# Patient Record
Sex: MALE | Race: OTHER | URBAN - METROPOLITAN AREA
[De-identification: names, ages, dates, MRNs, and addresses within clinical notes are randomized per-mention and may not be internally consistent; named-entity substitution may affect disease eponyms.]

---

## 2021-05-26 NOTE — H&P ADULT - ASSESSMENT
76 year old Danish speaking male with PMHx HTN, HLD presented to cardiologist with progressive SOB on exertion.  Using  ( [id number]) to obtain further information, pt is unsure of medications that he take and does not know which pharmacy he uses. Pt referred to daughter to speak on his behalf. Daughter Hailee ( [phone]) contacted medications and pharmacy confirmed.  Pt is currently taking atorvastatin 20mg HS and Lisinopril 10mg daily .   Referred for cardiac cath and possible PCI     76 year old Persian speaking male with PMHx HTN, HLD presented to cardiologist with progressive SOB on exertion.  Using  ( [id number]) to obtain further information, pt is unsure of medications that he take and does not know which pharmacy he uses. Pt referred to daughter to speak on his behalf. Daughter Hailee ( [phone]) contacted medications and pharmacy confirmed.  Pt is currently taking atorvastatin 20mg HS and Lisinopril 10mg daily .   Referred for cardiac cath and possible PCI    ASA class: II  Creatinine: 1.02  GFR: 72  Bleeding  Risk score: 1.1%

## 2021-05-26 NOTE — H&P ADULT - HISTORY OF PRESENT ILLNESS
76 year old male with PMHx HTN, HLD presented to cardiologist presented to cardiologist with progressive SOB on exertion  Referred for cardiac cath and possible PCI 76 year old Ukrainian speaking male with PMHx HTN, HLD presented to cardiologist with progressive SOB on exertion.  Using  ( [id number]) to obtain further information  Referred for cardiac cath and possible PCI    Covid 19 PCR neg 5/25/21

## 2021-05-26 NOTE — H&P ADULT - PROBLEM SELECTOR PLAN 1
- plan for cardiac cath with possible PCI   -Consent obtained (using #[id number]) for cardiac catheterization w/ coronary angiogram and possible stent placement. Pt is competent, has capacity, and understands risks and benefits of procedure. Risks and benefits discussed. Risk discussed included, but not limited to MI, stroke, mortality, major bleeding, arrythmia, or infection. All questions answered

## 2021-05-27 ENCOUNTER — OUTPATIENT (OUTPATIENT)
Dept: OUTPATIENT SERVICES | Facility: HOSPITAL | Age: 76
LOS: 1 days | Discharge: HOME | End: 2021-05-27
Payer: MEDICARE

## 2021-05-27 VITALS
RESPIRATION RATE: 18 BRPM | DIASTOLIC BLOOD PRESSURE: 68 MMHG | HEART RATE: 73 BPM | SYSTOLIC BLOOD PRESSURE: 146 MMHG | OXYGEN SATURATION: 98 %

## 2021-05-27 VITALS
DIASTOLIC BLOOD PRESSURE: 97 MMHG | HEIGHT: 66 IN | HEART RATE: 85 BPM | WEIGHT: 175.93 LBS | TEMPERATURE: 97 F | RESPIRATION RATE: 18 BRPM | SYSTOLIC BLOOD PRESSURE: 159 MMHG | OXYGEN SATURATION: 97 %

## 2021-05-27 DIAGNOSIS — R06.02 SHORTNESS OF BREATH: ICD-10-CM

## 2021-05-27 DIAGNOSIS — I25.10 ATHEROSCLEROTIC HEART DISEASE OF NATIVE CORONARY ARTERY WITHOUT ANGINA PECTORIS: ICD-10-CM

## 2021-05-27 PROCEDURE — C1760: CPT

## 2021-05-27 PROCEDURE — 93458 L HRT ARTERY/VENTRICLE ANGIO: CPT

## 2021-05-27 PROCEDURE — 99153 MOD SED SAME PHYS/QHP EA: CPT

## 2021-05-27 PROCEDURE — 99152 MOD SED SAME PHYS/QHP 5/>YRS: CPT

## 2021-05-27 PROCEDURE — C1887: CPT

## 2021-05-27 PROCEDURE — C1894: CPT

## 2021-05-27 RX ORDER — LISINOPRIL 2.5 MG/1
1 TABLET ORAL
Qty: 0 | Refills: 0 | DISCHARGE

## 2021-05-27 RX ORDER — ATORVASTATIN CALCIUM 80 MG/1
1 TABLET, FILM COATED ORAL
Qty: 0 | Refills: 0 | DISCHARGE

## 2021-05-27 RX ORDER — METOPROLOL TARTRATE 50 MG
1 TABLET ORAL
Qty: 0 | Refills: 0 | DISCHARGE

## 2021-05-27 NOTE — PACU DISCHARGE NOTE - COMMENTS
patient d/c'd home with daughter. .  V/S/S, right groin dressing CD & I. Patient educated on discharge instructions including medication regime, activity level, follow up appointments, and the signs and symptoms requiring the notification of their provider.  Verbalized understanding utilizing the teach back method.  Written instructions provided as well.

## 2021-05-27 NOTE — PROGRESS NOTE ADULT - SUBJECTIVE AND OBJECTIVE BOX
Nurse Practitioner Progress note:     HPI:  76 year old Syrian speaking male with PMHx HTN, HLD presented to cardiologist with progressive SOB on exertion.  Using  ( [id number]) to obtain further information  S/P LHC revealing normal cors      T(C): 36.2 HR: 85 BP: 159/97 RR: 18 SpO2: 97%     PHYSICAL EXAM:  NEURO: Non-focal, AxOx3.  No neuro deficits NECK: Supple, No JVD, No LAD  CHEST/LUNG: Clear to auscultation bilaterally; No wheeze  HEART: s1 s2 Regular rate and rhythm; No murmurs, rubs, or gallops  ABDOMEN: Soft, Nontender, Nondistended; Bowel sounds present X 4 quadrants   EXTREMITIES:  2+ Peripheral Pulses, No clubbing, cyanosis, or edema   VASCULAR: Peripheral pulses palpable 2+ bilaterally  PROCEDURE SITE: RFA closed with perclose ,Site is without hematoma or bleeding. Sensation and RIA intact. Distal pulses palpable 2+, capillary refill < 2 seconds. Patient denies pain, numbness, tingling, CP or SOB. Clean dry dressing applied     PROCEDURE RESULTS:  Full report to cath    ASSESSMENT: HPI:  76 year old Syrian speaking male with PMHx HTN, HLD presented to cardiologist with progressive SOB on exertion.  Using  ( [id number]) to obtain further information  Referred for cardiac cath and possible PCI  S/p LHC revealing normal cors     PLAN:  -VS, diet, activity as per post cath orders  -Encourage PO fluids  -Continue current medications  -Plan of care discussed with patient and Dr De Los Santos   -Post cath instructions reviewed, patient verbalizes and understands instructions  - Patient to be discharged home, recommend following up with cardiologist in 2 weeks

## 2021-05-28 DIAGNOSIS — I20.9 ANGINA PECTORIS, UNSPECIFIED: ICD-10-CM

## 2021-05-28 DIAGNOSIS — I10 ESSENTIAL (PRIMARY) HYPERTENSION: ICD-10-CM

## 2021-05-28 DIAGNOSIS — I25.119 ATHEROSCLEROTIC HEART DISEASE OF NATIVE CORONARY ARTERY WITH UNSPECIFIED ANGINA PECTORIS: ICD-10-CM

## 2021-05-28 DIAGNOSIS — E78.5 HYPERLIPIDEMIA, UNSPECIFIED: ICD-10-CM

## 2023-01-01 ENCOUNTER — NON-APPOINTMENT (OUTPATIENT)
Age: 78
End: 2023-01-01

## 2023-01-01 ENCOUNTER — APPOINTMENT (OUTPATIENT)
Dept: RADIATION ONCOLOGY | Facility: CLINIC | Age: 78
End: 2023-01-01
Payer: MEDICARE

## 2023-01-01 VITALS
SYSTOLIC BLOOD PRESSURE: 122 MMHG | OXYGEN SATURATION: 90 % | HEART RATE: 80 BPM | RESPIRATION RATE: 18 BRPM | WEIGHT: 155 LBS | DIASTOLIC BLOOD PRESSURE: 70 MMHG

## 2023-01-01 DIAGNOSIS — Z86.39 PERSONAL HISTORY OF OTHER ENDOCRINE, NUTRITIONAL AND METABOLIC DISEASE: ICD-10-CM

## 2023-01-01 DIAGNOSIS — Z80.9 FAMILY HISTORY OF MALIGNANT NEOPLASM, UNSPECIFIED: ICD-10-CM

## 2023-01-01 DIAGNOSIS — Z86.79 PERSONAL HISTORY OF OTHER DISEASES OF THE CIRCULATORY SYSTEM: ICD-10-CM

## 2023-01-01 DIAGNOSIS — Z87.891 PERSONAL HISTORY OF NICOTINE DEPENDENCE: ICD-10-CM

## 2023-01-01 DIAGNOSIS — J43.9 EMPHYSEMA, UNSPECIFIED: ICD-10-CM

## 2023-01-01 DIAGNOSIS — C34.90 MALIGNANT NEOPLASM OF UNSPECIFIED PART OF UNSPECIFIED BRONCHUS OR LUNG: ICD-10-CM

## 2023-01-01 PROCEDURE — 99204 OFFICE O/P NEW MOD 45 MIN: CPT

## 2023-01-01 RX ORDER — LISINOPRIL 30 MG/1
TABLET ORAL
Refills: 0 | Status: ACTIVE | COMMUNITY

## 2023-01-01 RX ORDER — BENZONATATE 100 MG/1
CAPSULE ORAL
Refills: 0 | Status: ACTIVE | COMMUNITY

## 2023-01-01 RX ORDER — SENNOSIDES 8.6 MG
8.6 TABLET ORAL
Refills: 0 | Status: ACTIVE | COMMUNITY

## 2023-01-01 RX ORDER — ATORVASTATIN CALCIUM 80 MG/1
TABLET, FILM COATED ORAL
Refills: 0 | Status: ACTIVE | COMMUNITY

## 2023-01-01 RX ORDER — METOPROLOL TARTRATE 75 MG/1
TABLET, FILM COATED ORAL
Refills: 0 | Status: ACTIVE | COMMUNITY

## 2023-10-09 PROBLEM — E78.5 HYPERLIPIDEMIA, UNSPECIFIED: Chronic | Status: ACTIVE | Noted: 2021-05-26

## 2023-10-10 PROBLEM — J43.9 EMPHYSEMA/COPD: Status: ACTIVE | Noted: 2023-01-01

## 2023-10-10 PROBLEM — Z00.00 ENCOUNTER FOR PREVENTIVE HEALTH EXAMINATION: Status: ACTIVE | Noted: 2023-01-01

## 2023-10-10 PROBLEM — Z86.39 HISTORY OF HYPERLIPIDEMIA: Status: RESOLVED | Noted: 2023-01-01 | Resolved: 2023-01-01

## 2023-10-10 PROBLEM — Z86.79 HISTORY OF HYPERTENSION: Status: RESOLVED | Noted: 2023-01-01 | Resolved: 2023-01-01

## 2023-10-10 PROBLEM — Z87.891 FORMER SMOKER: Status: ACTIVE | Noted: 2023-01-01

## 2023-10-10 PROBLEM — C34.90 NON-SMALL CELL CARCINOMA OF LUNG: Status: ACTIVE | Noted: 2023-01-01

## 2023-10-17 PROBLEM — Z80.9 FAMILY HISTORY OF MALIGNANT NEOPLASM: Status: ACTIVE | Noted: 2023-01-01

## 2024-01-01 ENCOUNTER — NON-APPOINTMENT (OUTPATIENT)
Age: 79
End: 2024-01-01

## 2024-01-01 ENCOUNTER — APPOINTMENT (OUTPATIENT)
Dept: RADIATION ONCOLOGY | Facility: CLINIC | Age: 79
End: 2024-01-01
Payer: MEDICARE

## 2024-01-01 ENCOUNTER — RESULT REVIEW (OUTPATIENT)
Age: 79
End: 2024-01-01

## 2024-01-01 ENCOUNTER — INPATIENT (INPATIENT)
Facility: HOSPITAL | Age: 79
LOS: 21 days | DRG: 871 | End: 2024-06-14
Attending: STUDENT IN AN ORGANIZED HEALTH CARE EDUCATION/TRAINING PROGRAM | Admitting: HOSPITALIST
Payer: MEDICARE

## 2024-01-01 VITALS
WEIGHT: 145 LBS | HEART RATE: 90 BPM | SYSTOLIC BLOOD PRESSURE: 122 MMHG | RESPIRATION RATE: 19 BRPM | DIASTOLIC BLOOD PRESSURE: 70 MMHG | OXYGEN SATURATION: 91 %

## 2024-01-01 VITALS
DIASTOLIC BLOOD PRESSURE: 88 MMHG | WEIGHT: 148 LBS | RESPIRATION RATE: 18 BRPM | HEART RATE: 80 BPM | SYSTOLIC BLOOD PRESSURE: 132 MMHG | OXYGEN SATURATION: 94 %

## 2024-01-01 VITALS
OXYGEN SATURATION: 93 % | DIASTOLIC BLOOD PRESSURE: 78 MMHG | RESPIRATION RATE: 18 BRPM | HEART RATE: 113 BPM | TEMPERATURE: 97 F | SYSTOLIC BLOOD PRESSURE: 136 MMHG

## 2024-01-01 VITALS
DIASTOLIC BLOOD PRESSURE: 72 MMHG | WEIGHT: 143 LBS | HEART RATE: 98 BPM | SYSTOLIC BLOOD PRESSURE: 130 MMHG | RESPIRATION RATE: 19 BRPM | OXYGEN SATURATION: 93 %

## 2024-01-01 VITALS
HEART RATE: 90 BPM | WEIGHT: 145 LBS | OXYGEN SATURATION: 93 % | SYSTOLIC BLOOD PRESSURE: 142 MMHG | HEART RATE: 89 BPM | DIASTOLIC BLOOD PRESSURE: 70 MMHG | RESPIRATION RATE: 18 BRPM | WEIGHT: 142 LBS | OXYGEN SATURATION: 95 % | SYSTOLIC BLOOD PRESSURE: 134 MMHG | RESPIRATION RATE: 18 BRPM | DIASTOLIC BLOOD PRESSURE: 70 MMHG

## 2024-01-01 VITALS
OXYGEN SATURATION: 85 % | SYSTOLIC BLOOD PRESSURE: 144 MMHG | WEIGHT: 158.95 LBS | HEIGHT: 68 IN | HEART RATE: 108 BPM | TEMPERATURE: 100 F | DIASTOLIC BLOOD PRESSURE: 83 MMHG | RESPIRATION RATE: 18 BRPM

## 2024-01-01 VITALS
WEIGHT: 145 LBS | HEART RATE: 90 BPM | RESPIRATION RATE: 18 BRPM | SYSTOLIC BLOOD PRESSURE: 118 MMHG | OXYGEN SATURATION: 91 % | DIASTOLIC BLOOD PRESSURE: 72 MMHG

## 2024-01-01 VITALS
WEIGHT: 144 LBS | RESPIRATION RATE: 18 BRPM | DIASTOLIC BLOOD PRESSURE: 70 MMHG | HEART RATE: 69 BPM | OXYGEN SATURATION: 93 % | SYSTOLIC BLOOD PRESSURE: 124 MMHG

## 2024-01-01 DIAGNOSIS — C34.90 MALIGNANT NEOPLASM OF UNSPECIFIED PART OF UNSPECIFIED BRONCHUS OR LUNG: ICD-10-CM

## 2024-01-01 DIAGNOSIS — J18.9 PNEUMONIA, UNSPECIFIED ORGANISM: ICD-10-CM

## 2024-01-01 LAB
A1C WITH ESTIMATED AVERAGE GLUCOSE RESULT: 6.4 % — HIGH (ref 4–5.6)
A1C WITH ESTIMATED AVERAGE GLUCOSE RESULT: 6.5 % — HIGH (ref 4–5.6)
ADD ON TEST-SPECIMEN IN LAB: SIGNIFICANT CHANGE UP
ALBUMIN SERPL ELPH-MCNC: 2.1 G/DL — LOW (ref 3.3–5)
ALBUMIN SERPL ELPH-MCNC: 2.6 G/DL — LOW (ref 3.3–5)
ALBUMIN SERPL ELPH-MCNC: 2.6 G/DL — LOW (ref 3.3–5)
ALP SERPL-CCNC: 107 U/L — SIGNIFICANT CHANGE UP (ref 40–120)
ALP SERPL-CCNC: 108 U/L — SIGNIFICANT CHANGE UP (ref 40–120)
ALP SERPL-CCNC: 97 U/L — SIGNIFICANT CHANGE UP (ref 40–120)
ALT FLD-CCNC: 26 U/L — SIGNIFICANT CHANGE UP (ref 12–78)
ALT FLD-CCNC: 31 U/L — SIGNIFICANT CHANGE UP (ref 12–78)
ALT FLD-CCNC: 68 U/L — SIGNIFICANT CHANGE UP (ref 12–78)
ANION GAP SERPL CALC-SCNC: 4 MMOL/L — LOW (ref 5–17)
ANION GAP SERPL CALC-SCNC: 4 MMOL/L — LOW (ref 5–17)
ANION GAP SERPL CALC-SCNC: 5 MMOL/L — SIGNIFICANT CHANGE UP (ref 5–17)
ANION GAP SERPL CALC-SCNC: 6 MMOL/L — SIGNIFICANT CHANGE UP (ref 5–17)
ANION GAP SERPL CALC-SCNC: 7 MMOL/L — SIGNIFICANT CHANGE UP (ref 5–17)
ANION GAP SERPL CALC-SCNC: 7 MMOL/L — SIGNIFICANT CHANGE UP (ref 5–17)
ANION GAP SERPL CALC-SCNC: 8 MMOL/L — SIGNIFICANT CHANGE UP (ref 5–17)
APTT BLD: 27 SEC — SIGNIFICANT CHANGE UP (ref 24.5–35.6)
AST SERPL-CCNC: 18 U/L — SIGNIFICANT CHANGE UP (ref 15–37)
AST SERPL-CCNC: 27 U/L — SIGNIFICANT CHANGE UP (ref 15–37)
AST SERPL-CCNC: 30 U/L — SIGNIFICANT CHANGE UP (ref 15–37)
BASE EXCESS BLDA CALC-SCNC: -0.1 MMOL/L — SIGNIFICANT CHANGE UP (ref -2–3)
BASE EXCESS BLDA CALC-SCNC: 2.1 MMOL/L — SIGNIFICANT CHANGE UP (ref -2–3)
BASE EXCESS BLDV CALC-SCNC: 5.5 MMOL/L — HIGH (ref -2–3)
BASOPHILS # BLD AUTO: 0 K/UL — SIGNIFICANT CHANGE UP (ref 0–0.2)
BASOPHILS # BLD AUTO: 0.01 K/UL — SIGNIFICANT CHANGE UP (ref 0–0.2)
BASOPHILS # BLD AUTO: 0.02 K/UL — SIGNIFICANT CHANGE UP (ref 0–0.2)
BASOPHILS # BLD AUTO: 0.03 K/UL — SIGNIFICANT CHANGE UP (ref 0–0.2)
BASOPHILS # BLD AUTO: 0.05 K/UL — SIGNIFICANT CHANGE UP (ref 0–0.2)
BASOPHILS NFR BLD AUTO: 0 % — SIGNIFICANT CHANGE UP (ref 0–2)
BASOPHILS NFR BLD AUTO: 0.1 % — SIGNIFICANT CHANGE UP (ref 0–2)
BASOPHILS NFR BLD AUTO: 0.2 % — SIGNIFICANT CHANGE UP (ref 0–2)
BASOPHILS NFR BLD AUTO: 0.2 % — SIGNIFICANT CHANGE UP (ref 0–2)
BASOPHILS NFR BLD AUTO: 0.5 % — SIGNIFICANT CHANGE UP (ref 0–2)
BILIRUB SERPL-MCNC: 0.5 MG/DL — SIGNIFICANT CHANGE UP (ref 0.2–1.2)
BILIRUB SERPL-MCNC: 0.8 MG/DL — SIGNIFICANT CHANGE UP (ref 0.2–1.2)
BILIRUB SERPL-MCNC: 0.9 MG/DL — SIGNIFICANT CHANGE UP (ref 0.2–1.2)
BLOOD GAS COMMENTS ARTERIAL: SIGNIFICANT CHANGE UP
BLOOD GAS COMMENTS ARTERIAL: SIGNIFICANT CHANGE UP
BUN SERPL-MCNC: 12 MG/DL — SIGNIFICANT CHANGE UP (ref 7–23)
BUN SERPL-MCNC: 15 MG/DL — SIGNIFICANT CHANGE UP (ref 7–23)
BUN SERPL-MCNC: 15 MG/DL — SIGNIFICANT CHANGE UP (ref 7–23)
BUN SERPL-MCNC: 16 MG/DL — SIGNIFICANT CHANGE UP (ref 7–23)
BUN SERPL-MCNC: 17 MG/DL — SIGNIFICANT CHANGE UP (ref 7–23)
BUN SERPL-MCNC: 17 MG/DL — SIGNIFICANT CHANGE UP (ref 7–23)
BUN SERPL-MCNC: 20 MG/DL — SIGNIFICANT CHANGE UP (ref 7–23)
BUN SERPL-MCNC: 21 MG/DL — SIGNIFICANT CHANGE UP (ref 7–23)
BUN SERPL-MCNC: 23 MG/DL — SIGNIFICANT CHANGE UP (ref 7–23)
BUN SERPL-MCNC: 23 MG/DL — SIGNIFICANT CHANGE UP (ref 7–23)
BUN SERPL-MCNC: 24 MG/DL — HIGH (ref 7–23)
BUN SERPL-MCNC: 25 MG/DL — HIGH (ref 7–23)
BUN SERPL-MCNC: 25 MG/DL — HIGH (ref 7–23)
BUN SERPL-MCNC: 26 MG/DL — HIGH (ref 7–23)
BUN SERPL-MCNC: 28 MG/DL — HIGH (ref 7–23)
BUN SERPL-MCNC: 28 MG/DL — HIGH (ref 7–23)
BUN SERPL-MCNC: 29 MG/DL — HIGH (ref 7–23)
BUN SERPL-MCNC: 32 MG/DL — HIGH (ref 7–23)
BUN SERPL-MCNC: 33 MG/DL — HIGH (ref 7–23)
CALCIUM SERPL-MCNC: 7.7 MG/DL — LOW (ref 8.5–10.1)
CALCIUM SERPL-MCNC: 7.8 MG/DL — LOW (ref 8.5–10.1)
CALCIUM SERPL-MCNC: 8 MG/DL — LOW (ref 8.5–10.1)
CALCIUM SERPL-MCNC: 8 MG/DL — LOW (ref 8.5–10.1)
CALCIUM SERPL-MCNC: 8.2 MG/DL — LOW (ref 8.5–10.1)
CALCIUM SERPL-MCNC: 8.3 MG/DL — LOW (ref 8.5–10.1)
CALCIUM SERPL-MCNC: 8.3 MG/DL — LOW (ref 8.5–10.1)
CALCIUM SERPL-MCNC: 8.4 MG/DL — LOW (ref 8.5–10.1)
CALCIUM SERPL-MCNC: 8.6 MG/DL — SIGNIFICANT CHANGE UP (ref 8.5–10.1)
CALCIUM SERPL-MCNC: 8.6 MG/DL — SIGNIFICANT CHANGE UP (ref 8.5–10.1)
CALCIUM SERPL-MCNC: 8.7 MG/DL — SIGNIFICANT CHANGE UP (ref 8.5–10.1)
CALCIUM SERPL-MCNC: 8.8 MG/DL — SIGNIFICANT CHANGE UP (ref 8.5–10.1)
CALCIUM SERPL-MCNC: 8.9 MG/DL — SIGNIFICANT CHANGE UP (ref 8.5–10.1)
CALCIUM SERPL-MCNC: 9 MG/DL — SIGNIFICANT CHANGE UP (ref 8.5–10.1)
CALCIUM SERPL-MCNC: 9.1 MG/DL — SIGNIFICANT CHANGE UP (ref 8.5–10.1)
CHLORIDE SERPL-SCNC: 101 MMOL/L — SIGNIFICANT CHANGE UP (ref 96–108)
CHLORIDE SERPL-SCNC: 102 MMOL/L — SIGNIFICANT CHANGE UP (ref 96–108)
CHLORIDE SERPL-SCNC: 103 MMOL/L — SIGNIFICANT CHANGE UP (ref 96–108)
CHLORIDE SERPL-SCNC: 90 MMOL/L — LOW (ref 96–108)
CHLORIDE SERPL-SCNC: 91 MMOL/L — LOW (ref 96–108)
CHLORIDE SERPL-SCNC: 92 MMOL/L — LOW (ref 96–108)
CHLORIDE SERPL-SCNC: 93 MMOL/L — LOW (ref 96–108)
CHLORIDE SERPL-SCNC: 95 MMOL/L — LOW (ref 96–108)
CHLORIDE SERPL-SCNC: 97 MMOL/L — SIGNIFICANT CHANGE UP (ref 96–108)
CHLORIDE SERPL-SCNC: 97 MMOL/L — SIGNIFICANT CHANGE UP (ref 96–108)
CO2 SERPL-SCNC: 24 MMOL/L — SIGNIFICANT CHANGE UP (ref 22–31)
CO2 SERPL-SCNC: 24 MMOL/L — SIGNIFICANT CHANGE UP (ref 22–31)
CO2 SERPL-SCNC: 25 MMOL/L — SIGNIFICANT CHANGE UP (ref 22–31)
CO2 SERPL-SCNC: 26 MMOL/L — SIGNIFICANT CHANGE UP (ref 22–31)
CO2 SERPL-SCNC: 26 MMOL/L — SIGNIFICANT CHANGE UP (ref 22–31)
CO2 SERPL-SCNC: 27 MMOL/L — SIGNIFICANT CHANGE UP (ref 22–31)
CO2 SERPL-SCNC: 28 MMOL/L — SIGNIFICANT CHANGE UP (ref 22–31)
CO2 SERPL-SCNC: 29 MMOL/L — SIGNIFICANT CHANGE UP (ref 22–31)
CORTIS AM PEAK SERPL-MCNC: 15.3 UG/DL — SIGNIFICANT CHANGE UP (ref 6–18.4)
CREAT SERPL-MCNC: 0.27 MG/DL — LOW (ref 0.5–1.3)
CREAT SERPL-MCNC: 0.28 MG/DL — LOW (ref 0.5–1.3)
CREAT SERPL-MCNC: 0.28 MG/DL — LOW (ref 0.5–1.3)
CREAT SERPL-MCNC: 0.32 MG/DL — LOW (ref 0.5–1.3)
CREAT SERPL-MCNC: 0.35 MG/DL — LOW (ref 0.5–1.3)
CREAT SERPL-MCNC: 0.36 MG/DL — LOW (ref 0.5–1.3)
CREAT SERPL-MCNC: 0.39 MG/DL — LOW (ref 0.5–1.3)
CREAT SERPL-MCNC: 0.42 MG/DL — LOW (ref 0.5–1.3)
CREAT SERPL-MCNC: 0.44 MG/DL — LOW (ref 0.5–1.3)
CREAT SERPL-MCNC: 0.46 MG/DL — LOW (ref 0.5–1.3)
CREAT SERPL-MCNC: 0.51 MG/DL — SIGNIFICANT CHANGE UP (ref 0.5–1.3)
CREAT SERPL-MCNC: 0.55 MG/DL — SIGNIFICANT CHANGE UP (ref 0.5–1.3)
CREAT SERPL-MCNC: 0.55 MG/DL — SIGNIFICANT CHANGE UP (ref 0.5–1.3)
CREAT SERPL-MCNC: 0.57 MG/DL — SIGNIFICANT CHANGE UP (ref 0.5–1.3)
CREAT SERPL-MCNC: 0.63 MG/DL — SIGNIFICANT CHANGE UP (ref 0.5–1.3)
CREAT SERPL-MCNC: 0.63 MG/DL — SIGNIFICANT CHANGE UP (ref 0.5–1.3)
CREAT SERPL-MCNC: 0.64 MG/DL — SIGNIFICANT CHANGE UP (ref 0.5–1.3)
CREAT SERPL-MCNC: 0.64 MG/DL — SIGNIFICANT CHANGE UP (ref 0.5–1.3)
CREAT SERPL-MCNC: 0.67 MG/DL — SIGNIFICANT CHANGE UP (ref 0.5–1.3)
CREAT SERPL-MCNC: 0.69 MG/DL — SIGNIFICANT CHANGE UP (ref 0.5–1.3)
CREAT SERPL-MCNC: 0.69 MG/DL — SIGNIFICANT CHANGE UP (ref 0.5–1.3)
CULTURE RESULTS: SIGNIFICANT CHANGE UP
CULTURE RESULTS: SIGNIFICANT CHANGE UP
DACRYOCYTES BLD QL SMEAR: SLIGHT — SIGNIFICANT CHANGE UP
EGFR: 100 ML/MIN/1.73M2 — SIGNIFICANT CHANGE UP
EGFR: 101 ML/MIN/1.73M2 — SIGNIFICANT CHANGE UP
EGFR: 101 ML/MIN/1.73M2 — SIGNIFICANT CHANGE UP
EGFR: 104 ML/MIN/1.73M2 — SIGNIFICANT CHANGE UP
EGFR: 107 ML/MIN/1.73M2 — SIGNIFICANT CHANGE UP
EGFR: 109 ML/MIN/1.73M2 — SIGNIFICANT CHANGE UP
EGFR: 110 ML/MIN/1.73M2 — SIGNIFICANT CHANGE UP
EGFR: 113 ML/MIN/1.73M2 — SIGNIFICANT CHANGE UP
EGFR: 115 ML/MIN/1.73M2 — SIGNIFICANT CHANGE UP
EGFR: 116 ML/MIN/1.73M2 — SIGNIFICANT CHANGE UP
EGFR: 119 ML/MIN/1.73M2 — SIGNIFICANT CHANGE UP
EGFR: 124 ML/MIN/1.73M2 — SIGNIFICANT CHANGE UP
EGFR: 124 ML/MIN/1.73M2 — SIGNIFICANT CHANGE UP
EGFR: 126 ML/MIN/1.73M2 — SIGNIFICANT CHANGE UP
EGFR: 95 ML/MIN/1.73M2 — SIGNIFICANT CHANGE UP
EGFR: 95 ML/MIN/1.73M2 — SIGNIFICANT CHANGE UP
EGFR: 96 ML/MIN/1.73M2 — SIGNIFICANT CHANGE UP
EGFR: 97 ML/MIN/1.73M2 — SIGNIFICANT CHANGE UP
EOSINOPHIL # BLD AUTO: 0 K/UL — SIGNIFICANT CHANGE UP (ref 0–0.5)
EOSINOPHIL # BLD AUTO: 0 K/UL — SIGNIFICANT CHANGE UP (ref 0–0.5)
EOSINOPHIL # BLD AUTO: 0.01 K/UL — SIGNIFICANT CHANGE UP (ref 0–0.5)
EOSINOPHIL # BLD AUTO: 0.11 K/UL — SIGNIFICANT CHANGE UP (ref 0–0.5)
EOSINOPHIL # BLD AUTO: 0.13 K/UL — SIGNIFICANT CHANGE UP (ref 0–0.5)
EOSINOPHIL NFR BLD AUTO: 0 % — SIGNIFICANT CHANGE UP (ref 0–6)
EOSINOPHIL NFR BLD AUTO: 0 % — SIGNIFICANT CHANGE UP (ref 0–6)
EOSINOPHIL NFR BLD AUTO: 0.1 % — SIGNIFICANT CHANGE UP (ref 0–6)
EOSINOPHIL NFR BLD AUTO: 1 % — SIGNIFICANT CHANGE UP (ref 0–6)
EOSINOPHIL NFR BLD AUTO: 1.2 % — SIGNIFICANT CHANGE UP (ref 0–6)
ESTIMATED AVERAGE GLUCOSE: 137 MG/DL — HIGH (ref 68–114)
ESTIMATED AVERAGE GLUCOSE: 140 MG/DL — HIGH (ref 68–114)
FLUAV AG NPH QL: SIGNIFICANT CHANGE UP
FLUBV AG NPH QL: SIGNIFICANT CHANGE UP
GAS PNL BLDA: SIGNIFICANT CHANGE UP
GAS PNL BLDV: SIGNIFICANT CHANGE UP
GLUCOSE BLDC GLUCOMTR-MCNC: 107 MG/DL — HIGH (ref 70–99)
GLUCOSE BLDC GLUCOMTR-MCNC: 109 MG/DL — HIGH (ref 70–99)
GLUCOSE BLDC GLUCOMTR-MCNC: 114 MG/DL — HIGH (ref 70–99)
GLUCOSE BLDC GLUCOMTR-MCNC: 117 MG/DL — HIGH (ref 70–99)
GLUCOSE BLDC GLUCOMTR-MCNC: 119 MG/DL — HIGH (ref 70–99)
GLUCOSE BLDC GLUCOMTR-MCNC: 125 MG/DL — HIGH (ref 70–99)
GLUCOSE BLDC GLUCOMTR-MCNC: 129 MG/DL — HIGH (ref 70–99)
GLUCOSE BLDC GLUCOMTR-MCNC: 129 MG/DL — HIGH (ref 70–99)
GLUCOSE BLDC GLUCOMTR-MCNC: 131 MG/DL — HIGH (ref 70–99)
GLUCOSE BLDC GLUCOMTR-MCNC: 131 MG/DL — HIGH (ref 70–99)
GLUCOSE BLDC GLUCOMTR-MCNC: 136 MG/DL — HIGH (ref 70–99)
GLUCOSE BLDC GLUCOMTR-MCNC: 138 MG/DL — HIGH (ref 70–99)
GLUCOSE BLDC GLUCOMTR-MCNC: 138 MG/DL — HIGH (ref 70–99)
GLUCOSE BLDC GLUCOMTR-MCNC: 139 MG/DL — HIGH (ref 70–99)
GLUCOSE BLDC GLUCOMTR-MCNC: 142 MG/DL — HIGH (ref 70–99)
GLUCOSE BLDC GLUCOMTR-MCNC: 143 MG/DL — HIGH (ref 70–99)
GLUCOSE BLDC GLUCOMTR-MCNC: 146 MG/DL — HIGH (ref 70–99)
GLUCOSE BLDC GLUCOMTR-MCNC: 148 MG/DL — HIGH (ref 70–99)
GLUCOSE BLDC GLUCOMTR-MCNC: 148 MG/DL — HIGH (ref 70–99)
GLUCOSE BLDC GLUCOMTR-MCNC: 149 MG/DL — HIGH (ref 70–99)
GLUCOSE BLDC GLUCOMTR-MCNC: 151 MG/DL — HIGH (ref 70–99)
GLUCOSE BLDC GLUCOMTR-MCNC: 151 MG/DL — HIGH (ref 70–99)
GLUCOSE BLDC GLUCOMTR-MCNC: 153 MG/DL — HIGH (ref 70–99)
GLUCOSE BLDC GLUCOMTR-MCNC: 154 MG/DL — HIGH (ref 70–99)
GLUCOSE BLDC GLUCOMTR-MCNC: 154 MG/DL — HIGH (ref 70–99)
GLUCOSE BLDC GLUCOMTR-MCNC: 158 MG/DL — HIGH (ref 70–99)
GLUCOSE BLDC GLUCOMTR-MCNC: 159 MG/DL — HIGH (ref 70–99)
GLUCOSE BLDC GLUCOMTR-MCNC: 163 MG/DL — HIGH (ref 70–99)
GLUCOSE BLDC GLUCOMTR-MCNC: 166 MG/DL — HIGH (ref 70–99)
GLUCOSE BLDC GLUCOMTR-MCNC: 168 MG/DL — HIGH (ref 70–99)
GLUCOSE BLDC GLUCOMTR-MCNC: 169 MG/DL — HIGH (ref 70–99)
GLUCOSE BLDC GLUCOMTR-MCNC: 169 MG/DL — HIGH (ref 70–99)
GLUCOSE BLDC GLUCOMTR-MCNC: 170 MG/DL — HIGH (ref 70–99)
GLUCOSE BLDC GLUCOMTR-MCNC: 170 MG/DL — HIGH (ref 70–99)
GLUCOSE BLDC GLUCOMTR-MCNC: 175 MG/DL — HIGH (ref 70–99)
GLUCOSE BLDC GLUCOMTR-MCNC: 176 MG/DL — HIGH (ref 70–99)
GLUCOSE BLDC GLUCOMTR-MCNC: 181 MG/DL — HIGH (ref 70–99)
GLUCOSE BLDC GLUCOMTR-MCNC: 182 MG/DL — HIGH (ref 70–99)
GLUCOSE BLDC GLUCOMTR-MCNC: 186 MG/DL — HIGH (ref 70–99)
GLUCOSE BLDC GLUCOMTR-MCNC: 189 MG/DL — HIGH (ref 70–99)
GLUCOSE BLDC GLUCOMTR-MCNC: 190 MG/DL — HIGH (ref 70–99)
GLUCOSE BLDC GLUCOMTR-MCNC: 191 MG/DL — HIGH (ref 70–99)
GLUCOSE BLDC GLUCOMTR-MCNC: 197 MG/DL — HIGH (ref 70–99)
GLUCOSE BLDC GLUCOMTR-MCNC: 198 MG/DL — HIGH (ref 70–99)
GLUCOSE BLDC GLUCOMTR-MCNC: 201 MG/DL — HIGH (ref 70–99)
GLUCOSE BLDC GLUCOMTR-MCNC: 202 MG/DL — HIGH (ref 70–99)
GLUCOSE BLDC GLUCOMTR-MCNC: 202 MG/DL — HIGH (ref 70–99)
GLUCOSE BLDC GLUCOMTR-MCNC: 205 MG/DL — HIGH (ref 70–99)
GLUCOSE BLDC GLUCOMTR-MCNC: 206 MG/DL — HIGH (ref 70–99)
GLUCOSE BLDC GLUCOMTR-MCNC: 207 MG/DL — HIGH (ref 70–99)
GLUCOSE BLDC GLUCOMTR-MCNC: 208 MG/DL — HIGH (ref 70–99)
GLUCOSE BLDC GLUCOMTR-MCNC: 209 MG/DL — HIGH (ref 70–99)
GLUCOSE BLDC GLUCOMTR-MCNC: 213 MG/DL — HIGH (ref 70–99)
GLUCOSE BLDC GLUCOMTR-MCNC: 214 MG/DL — HIGH (ref 70–99)
GLUCOSE BLDC GLUCOMTR-MCNC: 217 MG/DL — HIGH (ref 70–99)
GLUCOSE BLDC GLUCOMTR-MCNC: 218 MG/DL — HIGH (ref 70–99)
GLUCOSE BLDC GLUCOMTR-MCNC: 219 MG/DL — HIGH (ref 70–99)
GLUCOSE BLDC GLUCOMTR-MCNC: 219 MG/DL — HIGH (ref 70–99)
GLUCOSE BLDC GLUCOMTR-MCNC: 237 MG/DL — HIGH (ref 70–99)
GLUCOSE BLDC GLUCOMTR-MCNC: 240 MG/DL — HIGH (ref 70–99)
GLUCOSE BLDC GLUCOMTR-MCNC: 241 MG/DL — HIGH (ref 70–99)
GLUCOSE BLDC GLUCOMTR-MCNC: 242 MG/DL — HIGH (ref 70–99)
GLUCOSE BLDC GLUCOMTR-MCNC: 247 MG/DL — HIGH (ref 70–99)
GLUCOSE BLDC GLUCOMTR-MCNC: 261 MG/DL — HIGH (ref 70–99)
GLUCOSE BLDC GLUCOMTR-MCNC: 262 MG/DL — HIGH (ref 70–99)
GLUCOSE BLDC GLUCOMTR-MCNC: 270 MG/DL — HIGH (ref 70–99)
GLUCOSE BLDC GLUCOMTR-MCNC: 273 MG/DL — HIGH (ref 70–99)
GLUCOSE BLDC GLUCOMTR-MCNC: 276 MG/DL — HIGH (ref 70–99)
GLUCOSE BLDC GLUCOMTR-MCNC: 281 MG/DL — HIGH (ref 70–99)
GLUCOSE BLDC GLUCOMTR-MCNC: 298 MG/DL — HIGH (ref 70–99)
GLUCOSE BLDC GLUCOMTR-MCNC: 298 MG/DL — HIGH (ref 70–99)
GLUCOSE BLDC GLUCOMTR-MCNC: 308 MG/DL — HIGH (ref 70–99)
GLUCOSE BLDC GLUCOMTR-MCNC: 311 MG/DL — HIGH (ref 70–99)
GLUCOSE BLDC GLUCOMTR-MCNC: 322 MG/DL — HIGH (ref 70–99)
GLUCOSE BLDC GLUCOMTR-MCNC: 338 MG/DL — HIGH (ref 70–99)
GLUCOSE BLDC GLUCOMTR-MCNC: 79 MG/DL — SIGNIFICANT CHANGE UP (ref 70–99)
GLUCOSE BLDC GLUCOMTR-MCNC: 86 MG/DL — SIGNIFICANT CHANGE UP (ref 70–99)
GLUCOSE BLDC GLUCOMTR-MCNC: 87 MG/DL — SIGNIFICANT CHANGE UP (ref 70–99)
GLUCOSE BLDC GLUCOMTR-MCNC: 92 MG/DL — SIGNIFICANT CHANGE UP (ref 70–99)
GLUCOSE BLDC GLUCOMTR-MCNC: 94 MG/DL — SIGNIFICANT CHANGE UP (ref 70–99)
GLUCOSE BLDC GLUCOMTR-MCNC: 95 MG/DL — SIGNIFICANT CHANGE UP (ref 70–99)
GLUCOSE SERPL-MCNC: 104 MG/DL — HIGH (ref 70–99)
GLUCOSE SERPL-MCNC: 105 MG/DL — HIGH (ref 70–99)
GLUCOSE SERPL-MCNC: 107 MG/DL — HIGH (ref 70–99)
GLUCOSE SERPL-MCNC: 118 MG/DL — HIGH (ref 70–99)
GLUCOSE SERPL-MCNC: 122 MG/DL — HIGH (ref 70–99)
GLUCOSE SERPL-MCNC: 125 MG/DL — HIGH (ref 70–99)
GLUCOSE SERPL-MCNC: 126 MG/DL — HIGH (ref 70–99)
GLUCOSE SERPL-MCNC: 134 MG/DL — HIGH (ref 70–99)
GLUCOSE SERPL-MCNC: 140 MG/DL — HIGH (ref 70–99)
GLUCOSE SERPL-MCNC: 145 MG/DL — HIGH (ref 70–99)
GLUCOSE SERPL-MCNC: 156 MG/DL — HIGH (ref 70–99)
GLUCOSE SERPL-MCNC: 163 MG/DL — HIGH (ref 70–99)
GLUCOSE SERPL-MCNC: 165 MG/DL — HIGH (ref 70–99)
GLUCOSE SERPL-MCNC: 170 MG/DL — HIGH (ref 70–99)
GLUCOSE SERPL-MCNC: 182 MG/DL — HIGH (ref 70–99)
GLUCOSE SERPL-MCNC: 183 MG/DL — HIGH (ref 70–99)
GLUCOSE SERPL-MCNC: 224 MG/DL — HIGH (ref 70–99)
GLUCOSE SERPL-MCNC: 242 MG/DL — HIGH (ref 70–99)
GLUCOSE SERPL-MCNC: 81 MG/DL — SIGNIFICANT CHANGE UP (ref 70–99)
GLUCOSE SERPL-MCNC: 83 MG/DL — SIGNIFICANT CHANGE UP (ref 70–99)
GLUCOSE SERPL-MCNC: 84 MG/DL — SIGNIFICANT CHANGE UP (ref 70–99)
HCO3 BLDA-SCNC: 24 MMOL/L — SIGNIFICANT CHANGE UP (ref 21–28)
HCO3 BLDA-SCNC: 26 MMOL/L — SIGNIFICANT CHANGE UP (ref 21–28)
HCO3 BLDV-SCNC: 29 MMOL/L — SIGNIFICANT CHANGE UP (ref 22–29)
HCT VFR BLD CALC: 27.7 % — LOW (ref 39–50)
HCT VFR BLD CALC: 29.6 % — LOW (ref 39–50)
HCT VFR BLD CALC: 30.5 % — LOW (ref 39–50)
HCT VFR BLD CALC: 31.7 % — LOW (ref 39–50)
HCT VFR BLD CALC: 31.8 % — LOW (ref 39–50)
HCT VFR BLD CALC: 31.9 % — LOW (ref 39–50)
HCT VFR BLD CALC: 32.2 % — LOW (ref 39–50)
HCT VFR BLD CALC: 32.3 % — LOW (ref 39–50)
HCT VFR BLD CALC: 33.2 % — LOW (ref 39–50)
HCT VFR BLD CALC: 33.6 % — LOW (ref 39–50)
HCT VFR BLD CALC: 33.7 % — LOW (ref 39–50)
HCT VFR BLD CALC: 33.9 % — LOW (ref 39–50)
HCT VFR BLD CALC: 35.2 % — LOW (ref 39–50)
HGB BLD-MCNC: 10.4 G/DL — LOW (ref 13–17)
HGB BLD-MCNC: 10.8 G/DL — LOW (ref 13–17)
HGB BLD-MCNC: 10.9 G/DL — LOW (ref 13–17)
HGB BLD-MCNC: 11.1 G/DL — LOW (ref 13–17)
HGB BLD-MCNC: 11.1 G/DL — LOW (ref 13–17)
HGB BLD-MCNC: 11.2 G/DL — LOW (ref 13–17)
HGB BLD-MCNC: 11.4 G/DL — LOW (ref 13–17)
HGB BLD-MCNC: 11.4 G/DL — LOW (ref 13–17)
HGB BLD-MCNC: 11.5 G/DL — LOW (ref 13–17)
HGB BLD-MCNC: 11.6 G/DL — LOW (ref 13–17)
HGB BLD-MCNC: 11.6 G/DL — LOW (ref 13–17)
HGB BLD-MCNC: 11.7 G/DL — LOW (ref 13–17)
HGB BLD-MCNC: 9.4 G/DL — LOW (ref 13–17)
IMM GRANULOCYTES NFR BLD AUTO: 0.8 % — SIGNIFICANT CHANGE UP (ref 0–0.9)
IMM GRANULOCYTES NFR BLD AUTO: 0.9 % — SIGNIFICANT CHANGE UP (ref 0–0.9)
IMM GRANULOCYTES NFR BLD AUTO: 1.8 % — HIGH (ref 0–0.9)
IMM GRANULOCYTES NFR BLD AUTO: 1.9 % — HIGH (ref 0–0.9)
INR BLD: 1.21 RATIO — HIGH (ref 0.85–1.18)
LACTATE SERPL-SCNC: 1.9 MMOL/L — SIGNIFICANT CHANGE UP (ref 0.7–2)
LYMPHOCYTES # BLD AUTO: 0.23 K/UL — LOW (ref 1–3.3)
LYMPHOCYTES # BLD AUTO: 0.25 K/UL — LOW (ref 1–3.3)
LYMPHOCYTES # BLD AUTO: 0.25 K/UL — LOW (ref 1–3.3)
LYMPHOCYTES # BLD AUTO: 0.27 K/UL — LOW (ref 1–3.3)
LYMPHOCYTES # BLD AUTO: 0.55 K/UL — LOW (ref 1–3.3)
LYMPHOCYTES # BLD AUTO: 1.5 % — LOW (ref 13–44)
LYMPHOCYTES # BLD AUTO: 1.5 % — LOW (ref 13–44)
LYMPHOCYTES # BLD AUTO: 2 % — LOW (ref 13–44)
LYMPHOCYTES # BLD AUTO: 3.7 % — LOW (ref 13–44)
LYMPHOCYTES # BLD AUTO: 5.8 % — LOW (ref 13–44)
MAGNESIUM SERPL-MCNC: 1.8 MG/DL — SIGNIFICANT CHANGE UP (ref 1.6–2.6)
MAGNESIUM SERPL-MCNC: 1.8 MG/DL — SIGNIFICANT CHANGE UP (ref 1.6–2.6)
MANUAL SMEAR VERIFICATION: SIGNIFICANT CHANGE UP
MANUAL SMEAR VERIFICATION: SIGNIFICANT CHANGE UP
MCHC RBC-ENTMCNC: 29.1 PG — SIGNIFICANT CHANGE UP (ref 27–34)
MCHC RBC-ENTMCNC: 29.1 PG — SIGNIFICANT CHANGE UP (ref 27–34)
MCHC RBC-ENTMCNC: 29.2 PG — SIGNIFICANT CHANGE UP (ref 27–34)
MCHC RBC-ENTMCNC: 29.4 PG — SIGNIFICANT CHANGE UP (ref 27–34)
MCHC RBC-ENTMCNC: 29.5 PG — SIGNIFICANT CHANGE UP (ref 27–34)
MCHC RBC-ENTMCNC: 29.5 PG — SIGNIFICANT CHANGE UP (ref 27–34)
MCHC RBC-ENTMCNC: 29.6 PG — SIGNIFICANT CHANGE UP (ref 27–34)
MCHC RBC-ENTMCNC: 29.6 PG — SIGNIFICANT CHANGE UP (ref 27–34)
MCHC RBC-ENTMCNC: 29.7 PG — SIGNIFICANT CHANGE UP (ref 27–34)
MCHC RBC-ENTMCNC: 29.8 PG — SIGNIFICANT CHANGE UP (ref 27–34)
MCHC RBC-ENTMCNC: 29.8 PG — SIGNIFICANT CHANGE UP (ref 27–34)
MCHC RBC-ENTMCNC: 33 GM/DL — SIGNIFICANT CHANGE UP (ref 32–36)
MCHC RBC-ENTMCNC: 33.9 GM/DL — SIGNIFICANT CHANGE UP (ref 32–36)
MCHC RBC-ENTMCNC: 33.9 GM/DL — SIGNIFICANT CHANGE UP (ref 32–36)
MCHC RBC-ENTMCNC: 34.2 GM/DL — SIGNIFICANT CHANGE UP (ref 32–36)
MCHC RBC-ENTMCNC: 34.2 GM/DL — SIGNIFICANT CHANGE UP (ref 32–36)
MCHC RBC-ENTMCNC: 34.5 GM/DL — SIGNIFICANT CHANGE UP (ref 32–36)
MCHC RBC-ENTMCNC: 34.6 GM/DL — SIGNIFICANT CHANGE UP (ref 32–36)
MCHC RBC-ENTMCNC: 34.7 GM/DL — SIGNIFICANT CHANGE UP (ref 32–36)
MCHC RBC-ENTMCNC: 35 GM/DL — SIGNIFICANT CHANGE UP (ref 32–36)
MCHC RBC-ENTMCNC: 35.1 GM/DL — SIGNIFICANT CHANGE UP (ref 32–36)
MCHC RBC-ENTMCNC: 35.2 GM/DL — SIGNIFICANT CHANGE UP (ref 32–36)
MCHC RBC-ENTMCNC: 35.3 GM/DL — SIGNIFICANT CHANGE UP (ref 32–36)
MCHC RBC-ENTMCNC: 35.4 GM/DL — SIGNIFICANT CHANGE UP (ref 32–36)
MCV RBC AUTO: 83.9 FL — SIGNIFICANT CHANGE UP (ref 80–100)
MCV RBC AUTO: 84 FL — SIGNIFICANT CHANGE UP (ref 80–100)
MCV RBC AUTO: 84.4 FL — SIGNIFICANT CHANGE UP (ref 80–100)
MCV RBC AUTO: 84.6 FL — SIGNIFICANT CHANGE UP (ref 80–100)
MCV RBC AUTO: 84.9 FL — SIGNIFICANT CHANGE UP (ref 80–100)
MCV RBC AUTO: 85.1 FL — SIGNIFICANT CHANGE UP (ref 80–100)
MCV RBC AUTO: 85.2 FL — SIGNIFICANT CHANGE UP (ref 80–100)
MCV RBC AUTO: 85.6 FL — SIGNIFICANT CHANGE UP (ref 80–100)
MCV RBC AUTO: 85.8 FL — SIGNIFICANT CHANGE UP (ref 80–100)
MCV RBC AUTO: 86.1 FL — SIGNIFICANT CHANGE UP (ref 80–100)
MCV RBC AUTO: 86.2 FL — SIGNIFICANT CHANGE UP (ref 80–100)
MCV RBC AUTO: 86.8 FL — SIGNIFICANT CHANGE UP (ref 80–100)
MCV RBC AUTO: 88.2 FL — SIGNIFICANT CHANGE UP (ref 80–100)
MONOCYTES # BLD AUTO: 0.11 K/UL — SIGNIFICANT CHANGE UP (ref 0–0.9)
MONOCYTES # BLD AUTO: 0.13 K/UL — SIGNIFICANT CHANGE UP (ref 0–0.9)
MONOCYTES # BLD AUTO: 0.33 K/UL — SIGNIFICANT CHANGE UP (ref 0–0.9)
MONOCYTES # BLD AUTO: 0.69 K/UL — SIGNIFICANT CHANGE UP (ref 0–0.9)
MONOCYTES # BLD AUTO: 0.76 K/UL — SIGNIFICANT CHANGE UP (ref 0–0.9)
MONOCYTES NFR BLD AUTO: 1 % — LOW (ref 2–14)
MONOCYTES NFR BLD AUTO: 1.8 % — LOW (ref 2–14)
MONOCYTES NFR BLD AUTO: 2 % — SIGNIFICANT CHANGE UP (ref 2–14)
MONOCYTES NFR BLD AUTO: 3.7 % — SIGNIFICANT CHANGE UP (ref 2–14)
MONOCYTES NFR BLD AUTO: 8 % — SIGNIFICANT CHANGE UP (ref 2–14)
MYELOCYTES NFR BLD: 1 % — HIGH (ref 0–0)
NEUTROPHILS # BLD AUTO: 12.04 K/UL — HIGH (ref 1.8–7.4)
NEUTROPHILS # BLD AUTO: 15.8 K/UL — HIGH (ref 1.8–7.4)
NEUTROPHILS # BLD AUTO: 17.15 K/UL — HIGH (ref 1.8–7.4)
NEUTROPHILS # BLD AUTO: 5.83 K/UL — SIGNIFICANT CHANGE UP (ref 1.8–7.4)
NEUTROPHILS # BLD AUTO: 7.93 K/UL — HIGH (ref 1.8–7.4)
NEUTROPHILS NFR BLD AUTO: 83.6 % — HIGH (ref 43–77)
NEUTROPHILS NFR BLD AUTO: 92.7 % — HIGH (ref 43–77)
NEUTROPHILS NFR BLD AUTO: 93.5 % — HIGH (ref 43–77)
NEUTROPHILS NFR BLD AUTO: 94.5 % — HIGH (ref 43–77)
NEUTROPHILS NFR BLD AUTO: 95 % — HIGH (ref 43–77)
NRBC # BLD: 0 /100 WBCS — SIGNIFICANT CHANGE UP (ref 0–0)
NRBC # BLD: 0 /100 WBCS — SIGNIFICANT CHANGE UP (ref 0–0)
NRBC # BLD: SIGNIFICANT CHANGE UP /100 WBCS (ref 0–0)
NRBC # BLD: SIGNIFICANT CHANGE UP /100 WBCS (ref 0–0)
NT-PROBNP SERPL-SCNC: 117 PG/ML — SIGNIFICANT CHANGE UP (ref 0–450)
NT-PROBNP SERPL-SCNC: 230 PG/ML — SIGNIFICANT CHANGE UP (ref 0–450)
NT-PROBNP SERPL-SCNC: 3832 PG/ML — HIGH (ref 0–450)
NT-PROBNP SERPL-SCNC: 657 PG/ML — HIGH (ref 0–450)
OSMOLALITY SERPL: 270 MOSMOL/KG — LOW (ref 280–301)
OSMOLALITY UR: 399 MOSM/KG — SIGNIFICANT CHANGE UP (ref 50–1200)
OSMOLALITY UR: 684 MOSM/KG — SIGNIFICANT CHANGE UP (ref 50–1200)
PCO2 BLDA: 35 MMHG — SIGNIFICANT CHANGE UP (ref 35–48)
PCO2 BLDA: 36 MMHG — SIGNIFICANT CHANGE UP (ref 35–48)
PCO2 BLDV: 37 MMHG — LOW (ref 42–55)
PH BLDA: 7.43 — SIGNIFICANT CHANGE UP (ref 7.35–7.45)
PH BLDA: 7.47 — HIGH (ref 7.35–7.45)
PH BLDV: 7.5 — HIGH (ref 7.32–7.43)
PHOSPHATE SERPL-MCNC: 3.1 MG/DL — SIGNIFICANT CHANGE UP (ref 2.5–4.5)
PHOSPHATE SERPL-MCNC: 3.4 MG/DL — SIGNIFICANT CHANGE UP (ref 2.5–4.5)
PHOSPHATE SERPL-MCNC: 4.4 MG/DL — SIGNIFICANT CHANGE UP (ref 2.5–4.5)
PLAT MORPH BLD: NORMAL — SIGNIFICANT CHANGE UP
PLAT MORPH BLD: NORMAL — SIGNIFICANT CHANGE UP
PLATELET # BLD AUTO: 185 K/UL — SIGNIFICANT CHANGE UP (ref 150–400)
PLATELET # BLD AUTO: 186 K/UL — SIGNIFICANT CHANGE UP (ref 150–400)
PLATELET # BLD AUTO: 196 K/UL — SIGNIFICANT CHANGE UP (ref 150–400)
PLATELET # BLD AUTO: 226 K/UL — SIGNIFICANT CHANGE UP (ref 150–400)
PLATELET # BLD AUTO: 228 K/UL — SIGNIFICANT CHANGE UP (ref 150–400)
PLATELET # BLD AUTO: 236 K/UL — SIGNIFICANT CHANGE UP (ref 150–400)
PLATELET # BLD AUTO: 250 K/UL — SIGNIFICANT CHANGE UP (ref 150–400)
PLATELET # BLD AUTO: 258 K/UL — SIGNIFICANT CHANGE UP (ref 150–400)
PLATELET # BLD AUTO: 266 K/UL — SIGNIFICANT CHANGE UP (ref 150–400)
PLATELET # BLD AUTO: 273 K/UL — SIGNIFICANT CHANGE UP (ref 150–400)
PLATELET # BLD AUTO: 289 K/UL — SIGNIFICANT CHANGE UP (ref 150–400)
PLATELET # BLD AUTO: 291 K/UL — SIGNIFICANT CHANGE UP (ref 150–400)
PLATELET # BLD AUTO: 299 K/UL — SIGNIFICANT CHANGE UP (ref 150–400)
PO2 BLDA: 68 MMHG — LOW (ref 83–108)
PO2 BLDA: 83 MMHG — SIGNIFICANT CHANGE UP (ref 83–108)
PO2 BLDV: 240 MMHG — HIGH (ref 25–45)
POIKILOCYTOSIS BLD QL AUTO: SLIGHT — SIGNIFICANT CHANGE UP
POTASSIUM SERPL-MCNC: 3.7 MMOL/L — SIGNIFICANT CHANGE UP (ref 3.5–5.3)
POTASSIUM SERPL-MCNC: 3.8 MMOL/L — SIGNIFICANT CHANGE UP (ref 3.5–5.3)
POTASSIUM SERPL-MCNC: 3.9 MMOL/L — SIGNIFICANT CHANGE UP (ref 3.5–5.3)
POTASSIUM SERPL-MCNC: 4 MMOL/L — SIGNIFICANT CHANGE UP (ref 3.5–5.3)
POTASSIUM SERPL-MCNC: 4.1 MMOL/L — SIGNIFICANT CHANGE UP (ref 3.5–5.3)
POTASSIUM SERPL-MCNC: 4.2 MMOL/L — SIGNIFICANT CHANGE UP (ref 3.5–5.3)
POTASSIUM SERPL-MCNC: 4.2 MMOL/L — SIGNIFICANT CHANGE UP (ref 3.5–5.3)
POTASSIUM SERPL-MCNC: 4.3 MMOL/L — SIGNIFICANT CHANGE UP (ref 3.5–5.3)
POTASSIUM SERPL-MCNC: 4.4 MMOL/L — SIGNIFICANT CHANGE UP (ref 3.5–5.3)
POTASSIUM SERPL-MCNC: 4.5 MMOL/L — SIGNIFICANT CHANGE UP (ref 3.5–5.3)
POTASSIUM SERPL-MCNC: 4.6 MMOL/L — SIGNIFICANT CHANGE UP (ref 3.5–5.3)
POTASSIUM SERPL-MCNC: 4.7 MMOL/L — SIGNIFICANT CHANGE UP (ref 3.5–5.3)
POTASSIUM SERPL-MCNC: 4.7 MMOL/L — SIGNIFICANT CHANGE UP (ref 3.5–5.3)
POTASSIUM SERPL-MCNC: 4.9 MMOL/L — SIGNIFICANT CHANGE UP (ref 3.5–5.3)
POTASSIUM SERPL-SCNC: 3.7 MMOL/L — SIGNIFICANT CHANGE UP (ref 3.5–5.3)
POTASSIUM SERPL-SCNC: 3.8 MMOL/L — SIGNIFICANT CHANGE UP (ref 3.5–5.3)
POTASSIUM SERPL-SCNC: 3.9 MMOL/L — SIGNIFICANT CHANGE UP (ref 3.5–5.3)
POTASSIUM SERPL-SCNC: 4 MMOL/L — SIGNIFICANT CHANGE UP (ref 3.5–5.3)
POTASSIUM SERPL-SCNC: 4.1 MMOL/L — SIGNIFICANT CHANGE UP (ref 3.5–5.3)
POTASSIUM SERPL-SCNC: 4.2 MMOL/L — SIGNIFICANT CHANGE UP (ref 3.5–5.3)
POTASSIUM SERPL-SCNC: 4.2 MMOL/L — SIGNIFICANT CHANGE UP (ref 3.5–5.3)
POTASSIUM SERPL-SCNC: 4.3 MMOL/L — SIGNIFICANT CHANGE UP (ref 3.5–5.3)
POTASSIUM SERPL-SCNC: 4.4 MMOL/L — SIGNIFICANT CHANGE UP (ref 3.5–5.3)
POTASSIUM SERPL-SCNC: 4.5 MMOL/L — SIGNIFICANT CHANGE UP (ref 3.5–5.3)
POTASSIUM SERPL-SCNC: 4.6 MMOL/L — SIGNIFICANT CHANGE UP (ref 3.5–5.3)
POTASSIUM SERPL-SCNC: 4.7 MMOL/L — SIGNIFICANT CHANGE UP (ref 3.5–5.3)
POTASSIUM SERPL-SCNC: 4.7 MMOL/L — SIGNIFICANT CHANGE UP (ref 3.5–5.3)
POTASSIUM SERPL-SCNC: 4.9 MMOL/L — SIGNIFICANT CHANGE UP (ref 3.5–5.3)
PROCALCITONIN SERPL-MCNC: 0.11 NG/ML — HIGH (ref 0.02–0.1)
PROT SERPL-MCNC: 6.3 GM/DL — SIGNIFICANT CHANGE UP (ref 6–8.3)
PROT SERPL-MCNC: 6.6 GM/DL — SIGNIFICANT CHANGE UP (ref 6–8.3)
PROT SERPL-MCNC: 7 GM/DL — SIGNIFICANT CHANGE UP (ref 6–8.3)
PROTHROM AB SERPL-ACNC: 13.6 SEC — HIGH (ref 9.5–13)
RAPID RVP RESULT: SIGNIFICANT CHANGE UP
RBC # BLD: 3.19 M/UL — LOW (ref 4.2–5.8)
RBC # BLD: 3.5 M/UL — LOW (ref 4.2–5.8)
RBC # BLD: 3.63 M/UL — LOW (ref 4.2–5.8)
RBC # BLD: 3.72 M/UL — LOW (ref 4.2–5.8)
RBC # BLD: 3.74 M/UL — LOW (ref 4.2–5.8)
RBC # BLD: 3.75 M/UL — LOW (ref 4.2–5.8)
RBC # BLD: 3.77 M/UL — LOW (ref 4.2–5.8)
RBC # BLD: 3.85 M/UL — LOW (ref 4.2–5.8)
RBC # BLD: 3.88 M/UL — LOW (ref 4.2–5.8)
RBC # BLD: 3.9 M/UL — LOW (ref 4.2–5.8)
RBC # BLD: 3.95 M/UL — LOW (ref 4.2–5.8)
RBC # BLD: 3.97 M/UL — LOW (ref 4.2–5.8)
RBC # BLD: 3.99 M/UL — LOW (ref 4.2–5.8)
RBC # FLD: 16.1 % — HIGH (ref 10.3–14.5)
RBC # FLD: 16.1 % — HIGH (ref 10.3–14.5)
RBC # FLD: 16.3 % — HIGH (ref 10.3–14.5)
RBC # FLD: 16.3 % — HIGH (ref 10.3–14.5)
RBC # FLD: 16.4 % — HIGH (ref 10.3–14.5)
RBC # FLD: 16.5 % — HIGH (ref 10.3–14.5)
RBC # FLD: 16.5 % — HIGH (ref 10.3–14.5)
RBC # FLD: 16.7 % — HIGH (ref 10.3–14.5)
RBC # FLD: 17.2 % — HIGH (ref 10.3–14.5)
RBC # FLD: 17.2 % — HIGH (ref 10.3–14.5)
RBC BLD AUTO: ABNORMAL
RBC BLD AUTO: SIGNIFICANT CHANGE UP
RSV RNA NPH QL NAA+NON-PROBE: SIGNIFICANT CHANGE UP
SAO2 % BLDA: 95 % — SIGNIFICANT CHANGE UP (ref 94–98)
SAO2 % BLDA: 98 % — SIGNIFICANT CHANGE UP (ref 94–98)
SAO2 % BLDV: 100 % — HIGH (ref 67–88)
SARS-COV-2 RNA SPEC QL NAA+PROBE: SIGNIFICANT CHANGE UP
SARS-COV-2 RNA SPEC QL NAA+PROBE: SIGNIFICANT CHANGE UP
SCHISTOCYTES BLD QL AUTO: SLIGHT — SIGNIFICANT CHANGE UP
SODIUM SERPL-SCNC: 121 MMOL/L — LOW (ref 135–145)
SODIUM SERPL-SCNC: 122 MMOL/L — LOW (ref 135–145)
SODIUM SERPL-SCNC: 123 MMOL/L — LOW (ref 135–145)
SODIUM SERPL-SCNC: 124 MMOL/L — LOW (ref 135–145)
SODIUM SERPL-SCNC: 125 MMOL/L — LOW (ref 135–145)
SODIUM SERPL-SCNC: 126 MMOL/L — LOW (ref 135–145)
SODIUM SERPL-SCNC: 126 MMOL/L — LOW (ref 135–145)
SODIUM SERPL-SCNC: 127 MMOL/L — LOW (ref 135–145)
SODIUM SERPL-SCNC: 127 MMOL/L — LOW (ref 135–145)
SODIUM SERPL-SCNC: 129 MMOL/L — LOW (ref 135–145)
SODIUM SERPL-SCNC: 130 MMOL/L — LOW (ref 135–145)
SODIUM SERPL-SCNC: 134 MMOL/L — LOW (ref 135–145)
SODIUM SERPL-SCNC: 135 MMOL/L — SIGNIFICANT CHANGE UP (ref 135–145)
SODIUM SERPL-SCNC: 135 MMOL/L — SIGNIFICANT CHANGE UP (ref 135–145)
SODIUM UR-SCNC: 87 MMOL/L — SIGNIFICANT CHANGE UP
SODIUM UR-SCNC: 90 MMOL/L — SIGNIFICANT CHANGE UP
SPECIMEN SOURCE: SIGNIFICANT CHANGE UP
SPECIMEN SOURCE: SIGNIFICANT CHANGE UP
TROPONIN I, HIGH SENSITIVITY RESULT: 116.63 NG/L — HIGH
TROPONIN I, HIGH SENSITIVITY RESULT: 123.38 NG/L — HIGH
TROPONIN I, HIGH SENSITIVITY RESULT: 149.07 NG/L — HIGH
TSH SERPL-MCNC: 0.12 UU/ML — LOW (ref 0.34–4.82)
URATE SERPL-MCNC: 2.1 MG/DL — LOW (ref 3.4–8.8)
URATE SERPL-MCNC: 2.8 MG/DL — LOW (ref 3.4–8.8)
WBC # BLD: 12.67 K/UL — HIGH (ref 3.8–10.5)
WBC # BLD: 13.51 K/UL — HIGH (ref 3.8–10.5)
WBC # BLD: 13.55 K/UL — HIGH (ref 3.8–10.5)
WBC # BLD: 13.84 K/UL — HIGH (ref 3.8–10.5)
WBC # BLD: 15.53 K/UL — HIGH (ref 3.8–10.5)
WBC # BLD: 16.71 K/UL — HIGH (ref 3.8–10.5)
WBC # BLD: 17.05 K/UL — HIGH (ref 3.8–10.5)
WBC # BLD: 18.25 K/UL — HIGH (ref 3.8–10.5)
WBC # BLD: 18.29 K/UL — HIGH (ref 3.8–10.5)
WBC # BLD: 18.49 K/UL — HIGH (ref 3.8–10.5)
WBC # BLD: 19.78 K/UL — HIGH (ref 3.8–10.5)
WBC # BLD: 6.29 K/UL — SIGNIFICANT CHANGE UP (ref 3.8–10.5)
WBC # BLD: 9.49 K/UL — SIGNIFICANT CHANGE UP (ref 3.8–10.5)
WBC # FLD AUTO: 12.67 K/UL — HIGH (ref 3.8–10.5)
WBC # FLD AUTO: 13.51 K/UL — HIGH (ref 3.8–10.5)
WBC # FLD AUTO: 13.55 K/UL — HIGH (ref 3.8–10.5)
WBC # FLD AUTO: 13.84 K/UL — HIGH (ref 3.8–10.5)
WBC # FLD AUTO: 15.53 K/UL — HIGH (ref 3.8–10.5)
WBC # FLD AUTO: 16.71 K/UL — HIGH (ref 3.8–10.5)
WBC # FLD AUTO: 17.05 K/UL — HIGH (ref 3.8–10.5)
WBC # FLD AUTO: 18.25 K/UL — HIGH (ref 3.8–10.5)
WBC # FLD AUTO: 18.29 K/UL — HIGH (ref 3.8–10.5)
WBC # FLD AUTO: 18.49 K/UL — HIGH (ref 3.8–10.5)
WBC # FLD AUTO: 19.78 K/UL — HIGH (ref 3.8–10.5)
WBC # FLD AUTO: 6.29 K/UL — SIGNIFICANT CHANGE UP (ref 3.8–10.5)
WBC # FLD AUTO: 9.49 K/UL — SIGNIFICANT CHANGE UP (ref 3.8–10.5)

## 2024-01-01 PROCEDURE — 99285 EMERGENCY DEPT VISIT HI MDM: CPT

## 2024-01-01 PROCEDURE — 71275 CT ANGIOGRAPHY CHEST: CPT | Mod: 26,MC

## 2024-01-01 PROCEDURE — G6002: CPT

## 2024-01-01 PROCEDURE — 97530 THERAPEUTIC ACTIVITIES: CPT | Mod: GP

## 2024-01-01 PROCEDURE — 77014: CPT

## 2024-01-01 PROCEDURE — G6015: CPT

## 2024-01-01 PROCEDURE — 99232 SBSQ HOSP IP/OBS MODERATE 35: CPT | Mod: 25

## 2024-01-01 PROCEDURE — 71045 X-RAY EXAM CHEST 1 VIEW: CPT | Mod: 26

## 2024-01-01 PROCEDURE — 99232 SBSQ HOSP IP/OBS MODERATE 35: CPT

## 2024-01-01 PROCEDURE — 99233 SBSQ HOSP IP/OBS HIGH 50: CPT

## 2024-01-01 PROCEDURE — 99497 ADVNCD CARE PLAN 30 MIN: CPT

## 2024-01-01 PROCEDURE — 77263 THER RADIOLOGY TX PLNG CPLX: CPT

## 2024-01-01 PROCEDURE — 77336 RADIATION PHYSICS CONSULT: CPT

## 2024-01-01 PROCEDURE — 83735 ASSAY OF MAGNESIUM: CPT

## 2024-01-01 PROCEDURE — 77427 RADIATION TX MANAGEMENT X5: CPT

## 2024-01-01 PROCEDURE — 80048 BASIC METABOLIC PNL TOTAL CA: CPT

## 2024-01-01 PROCEDURE — 93308 TTE F-UP OR LMTD: CPT | Mod: 26

## 2024-01-01 PROCEDURE — 93010 ELECTROCARDIOGRAM REPORT: CPT

## 2024-01-01 PROCEDURE — 94640 AIRWAY INHALATION TREATMENT: CPT

## 2024-01-01 PROCEDURE — 71275 CT ANGIOGRAPHY CHEST: CPT | Mod: 26

## 2024-01-01 PROCEDURE — 99223 1ST HOSP IP/OBS HIGH 75: CPT

## 2024-01-01 PROCEDURE — 97162 PT EVAL MOD COMPLEX 30 MIN: CPT | Mod: GP

## 2024-01-01 PROCEDURE — 84550 ASSAY OF BLOOD/URIC ACID: CPT

## 2024-01-01 PROCEDURE — 36600 WITHDRAWAL OF ARTERIAL BLOOD: CPT

## 2024-01-01 PROCEDURE — 84145 PROCALCITONIN (PCT): CPT

## 2024-01-01 PROCEDURE — 71045 X-RAY EXAM CHEST 1 VIEW: CPT

## 2024-01-01 PROCEDURE — 77333 RADIATION TREATMENT AID(S): CPT

## 2024-01-01 PROCEDURE — 36415 COLL VENOUS BLD VENIPUNCTURE: CPT

## 2024-01-01 PROCEDURE — 99498 ADVNCD CARE PLAN ADDL 30 MIN: CPT

## 2024-01-01 PROCEDURE — 85025 COMPLETE CBC W/AUTO DIFF WBC: CPT

## 2024-01-01 PROCEDURE — 85027 COMPLETE CBC AUTOMATED: CPT

## 2024-01-01 PROCEDURE — 71046 X-RAY EXAM CHEST 2 VIEWS: CPT

## 2024-01-01 PROCEDURE — 99223 1ST HOSP IP/OBS HIGH 75: CPT | Mod: AI

## 2024-01-01 PROCEDURE — 83880 ASSAY OF NATRIURETIC PEPTIDE: CPT

## 2024-01-01 PROCEDURE — 82962 GLUCOSE BLOOD TEST: CPT

## 2024-01-01 PROCEDURE — 83935 ASSAY OF URINE OSMOLALITY: CPT

## 2024-01-01 PROCEDURE — 84300 ASSAY OF URINE SODIUM: CPT

## 2024-01-01 PROCEDURE — 99239 HOSP IP/OBS DSCHRG MGMT >30: CPT

## 2024-01-01 PROCEDURE — 80053 COMPREHEN METABOLIC PANEL: CPT

## 2024-01-01 PROCEDURE — 93306 TTE W/DOPPLER COMPLETE: CPT | Mod: 26

## 2024-01-01 PROCEDURE — 82803 BLOOD GASES ANY COMBINATION: CPT

## 2024-01-01 PROCEDURE — 77470 SPECIAL RADIATION TREATMENT: CPT

## 2024-01-01 PROCEDURE — 82533 TOTAL CORTISOL: CPT

## 2024-01-01 PROCEDURE — 97116 GAIT TRAINING THERAPY: CPT | Mod: GP

## 2024-01-01 PROCEDURE — 84100 ASSAY OF PHOSPHORUS: CPT

## 2024-01-01 PROCEDURE — 71046 X-RAY EXAM CHEST 2 VIEWS: CPT | Mod: 26

## 2024-01-01 PROCEDURE — 83036 HEMOGLOBIN GLYCOSYLATED A1C: CPT

## 2024-01-01 PROCEDURE — 93306 TTE W/DOPPLER COMPLETE: CPT

## 2024-01-01 PROCEDURE — 71250 CT THORAX DX C-: CPT | Mod: 26

## 2024-01-01 PROCEDURE — 77301 RADIOTHERAPY DOSE PLAN IMRT: CPT

## 2024-01-01 PROCEDURE — 99215 OFFICE O/P EST HI 40 MIN: CPT

## 2024-01-01 PROCEDURE — 76376 3D RENDER W/INTRP POSTPROCES: CPT | Mod: 26

## 2024-01-01 PROCEDURE — 77338 DESIGN MLC DEVICE FOR IMRT: CPT

## 2024-01-01 PROCEDURE — 93005 ELECTROCARDIOGRAM TRACING: CPT

## 2024-01-01 PROCEDURE — 93308 TTE F-UP OR LMTD: CPT

## 2024-01-01 PROCEDURE — 77300 RADIATION THERAPY DOSE PLAN: CPT

## 2024-01-01 PROCEDURE — 84443 ASSAY THYROID STIM HORMONE: CPT

## 2024-01-01 PROCEDURE — 84484 ASSAY OF TROPONIN QUANT: CPT

## 2024-01-01 PROCEDURE — 71250 CT THORAX DX C-: CPT | Mod: MC

## 2024-01-01 PROCEDURE — 71275 CT ANGIOGRAPHY CHEST: CPT | Mod: MC

## 2024-01-01 PROCEDURE — 83930 ASSAY OF BLOOD OSMOLALITY: CPT

## 2024-01-01 PROCEDURE — 76376 3D RENDER W/INTRP POSTPROCES: CPT

## 2024-01-01 PROCEDURE — 94760 N-INVAS EAR/PLS OXIMETRY 1: CPT

## 2024-01-01 RX ORDER — SENNA PLUS 8.6 MG/1
2 TABLET ORAL AT BEDTIME
Refills: 0 | Status: DISCONTINUED | OUTPATIENT
Start: 2024-01-01 | End: 2024-01-01

## 2024-01-01 RX ORDER — CEFEPIME 1 G/1
2000 INJECTION, POWDER, FOR SOLUTION INTRAMUSCULAR; INTRAVENOUS EVERY 8 HOURS
Refills: 0 | Status: DISCONTINUED | OUTPATIENT
Start: 2024-01-01 | End: 2024-01-01

## 2024-01-01 RX ORDER — ASPIRIN/CALCIUM CARB/MAGNESIUM 324 MG
1 TABLET ORAL
Refills: 0 | DISCHARGE

## 2024-01-01 RX ORDER — SODIUM CHLORIDE 5 G/100ML
500 INJECTION, SOLUTION INTRAVENOUS
Refills: 0 | Status: DISCONTINUED | OUTPATIENT
Start: 2024-01-01 | End: 2024-01-01

## 2024-01-01 RX ORDER — ATORVASTATIN CALCIUM 80 MG/1
1 TABLET, FILM COATED ORAL
Refills: 0 | DISCHARGE

## 2024-01-01 RX ORDER — SODIUM CHLORIDE 9 MG/ML
1 INJECTION INTRAMUSCULAR; INTRAVENOUS; SUBCUTANEOUS
Refills: 0 | Status: DISCONTINUED | OUTPATIENT
Start: 2024-01-01 | End: 2024-01-01

## 2024-01-01 RX ORDER — INSULIN GLARGINE 100 [IU]/ML
10 INJECTION, SOLUTION SUBCUTANEOUS EVERY MORNING
Refills: 0 | Status: DISCONTINUED | OUTPATIENT
Start: 2024-01-01 | End: 2024-01-01

## 2024-01-01 RX ORDER — BUDESONIDE AND FORMOTEROL FUMARATE DIHYDRATE 160; 4.5 UG/1; UG/1
2 AEROSOL RESPIRATORY (INHALATION)
Refills: 0 | Status: DISCONTINUED | OUTPATIENT
Start: 2024-01-01 | End: 2024-01-01

## 2024-01-01 RX ORDER — INSULIN GLARGINE 100 [IU]/ML
5 INJECTION, SOLUTION SUBCUTANEOUS ONCE
Refills: 0 | Status: COMPLETED | OUTPATIENT
Start: 2024-01-01 | End: 2024-01-01

## 2024-01-01 RX ORDER — LISINOPRIL 2.5 MG/1
1 TABLET ORAL
Qty: 0 | Refills: 0 | DISCHARGE

## 2024-01-01 RX ORDER — CEFEPIME 1 G/1
INJECTION, POWDER, FOR SOLUTION INTRAMUSCULAR; INTRAVENOUS
Refills: 0 | Status: DISCONTINUED | OUTPATIENT
Start: 2024-01-01 | End: 2024-01-01

## 2024-01-01 RX ORDER — CLOTRIMAZOLE 10 MG
1 TROCHE MUCOUS MEMBRANE
Refills: 0 | DISCHARGE

## 2024-01-01 RX ORDER — ATORVASTATIN CALCIUM 80 MG/1
1 TABLET, FILM COATED ORAL
Qty: 0 | Refills: 0 | DISCHARGE

## 2024-01-01 RX ORDER — ALBUTEROL 90 UG/1
2.5 AEROSOL, METERED ORAL EVERY 6 HOURS
Refills: 0 | Status: DISCONTINUED | OUTPATIENT
Start: 2024-01-01 | End: 2024-01-01

## 2024-01-01 RX ORDER — ACETAMINOPHEN 500 MG
650 TABLET ORAL EVERY 6 HOURS
Refills: 0 | Status: DISCONTINUED | OUTPATIENT
Start: 2024-01-01 | End: 2024-01-01

## 2024-01-01 RX ORDER — FUROSEMIDE 40 MG
40 TABLET ORAL ONCE
Refills: 0 | Status: COMPLETED | OUTPATIENT
Start: 2024-01-01 | End: 2024-01-01

## 2024-01-01 RX ORDER — SODIUM CHLORIDE 9 MG/ML
1000 INJECTION, SOLUTION INTRAVENOUS
Refills: 0 | Status: DISCONTINUED | OUTPATIENT
Start: 2024-01-01 | End: 2024-01-01

## 2024-01-01 RX ORDER — LANOLIN ALCOHOL/MO/W.PET/CERES
3 CREAM (GRAM) TOPICAL AT BEDTIME
Refills: 0 | Status: DISCONTINUED | OUTPATIENT
Start: 2024-01-01 | End: 2024-01-01

## 2024-01-01 RX ORDER — CEFEPIME 1 G/1
2000 INJECTION, POWDER, FOR SOLUTION INTRAMUSCULAR; INTRAVENOUS ONCE
Refills: 0 | Status: COMPLETED | OUTPATIENT
Start: 2024-01-01 | End: 2024-01-01

## 2024-01-01 RX ORDER — SODIUM CHLORIDE 9 MG/ML
1 INJECTION INTRAMUSCULAR; INTRAVENOUS; SUBCUTANEOUS THREE TIMES A DAY
Refills: 0 | Status: DISCONTINUED | OUTPATIENT
Start: 2024-01-01 | End: 2024-01-01

## 2024-01-01 RX ORDER — INSULIN GLARGINE 100 [IU]/ML
5 INJECTION, SOLUTION SUBCUTANEOUS EVERY MORNING
Refills: 0 | Status: DISCONTINUED | OUTPATIENT
Start: 2024-01-01 | End: 2024-01-01

## 2024-01-01 RX ORDER — THIAMINE MONONITRATE (VIT B1) 100 MG
100 TABLET ORAL DAILY
Refills: 0 | Status: DISCONTINUED | OUTPATIENT
Start: 2024-01-01 | End: 2024-01-01

## 2024-01-01 RX ORDER — DEXTROSE 50 % IN WATER 50 %
15 SYRINGE (ML) INTRAVENOUS ONCE
Refills: 0 | Status: DISCONTINUED | OUTPATIENT
Start: 2024-01-01 | End: 2024-01-01

## 2024-01-01 RX ORDER — MORPHINE SULFATE 50 MG/1
5 CAPSULE, EXTENDED RELEASE ORAL EVERY 4 HOURS
Refills: 0 | Status: DISCONTINUED | OUTPATIENT
Start: 2024-01-01 | End: 2024-01-01

## 2024-01-01 RX ORDER — CEFTRIAXONE 500 MG/1
1000 INJECTION, POWDER, FOR SOLUTION INTRAMUSCULAR; INTRAVENOUS ONCE
Refills: 0 | Status: DISCONTINUED | OUTPATIENT
Start: 2024-01-01 | End: 2024-01-01

## 2024-01-01 RX ORDER — ALBUTEROL 90 UG/1
2 AEROSOL, METERED ORAL
Refills: 0 | DISCHARGE

## 2024-01-01 RX ORDER — AZITHROMYCIN 500 MG/1
500 TABLET, FILM COATED ORAL ONCE
Refills: 0 | Status: COMPLETED | OUTPATIENT
Start: 2024-01-01 | End: 2024-01-01

## 2024-01-01 RX ORDER — TIOTROPIUM BROMIDE 18 UG/1
2 CAPSULE ORAL; RESPIRATORY (INHALATION) DAILY
Refills: 0 | Status: DISCONTINUED | OUTPATIENT
Start: 2024-01-01 | End: 2024-01-01

## 2024-01-01 RX ORDER — INSULIN LISPRO 100/ML
3 VIAL (ML) SUBCUTANEOUS
Refills: 0 | Status: DISCONTINUED | OUTPATIENT
Start: 2024-01-01 | End: 2024-01-01

## 2024-01-01 RX ORDER — DEXTROSE 10 % IN WATER 10 %
125 INTRAVENOUS SOLUTION INTRAVENOUS ONCE
Refills: 0 | Status: DISCONTINUED | OUTPATIENT
Start: 2024-01-01 | End: 2024-01-01

## 2024-01-01 RX ORDER — CLOTRIMAZOLE 10 MG
1 TROCHE MUCOUS MEMBRANE
Refills: 0 | Status: DISCONTINUED | OUTPATIENT
Start: 2024-01-01 | End: 2024-01-01

## 2024-01-01 RX ORDER — SODIUM CHLORIDE 9 MG/ML
1000 INJECTION INTRAMUSCULAR; INTRAVENOUS; SUBCUTANEOUS
Refills: 0 | Status: DISCONTINUED | OUTPATIENT
Start: 2024-01-01 | End: 2024-01-01

## 2024-01-01 RX ORDER — POTASSIUM CHLORIDE 20 MEQ
10 PACKET (EA) ORAL DAILY
Refills: 0 | Status: COMPLETED | OUTPATIENT
Start: 2024-01-01 | End: 2024-01-01

## 2024-01-01 RX ORDER — LISINOPRIL 2.5 MG/1
10 TABLET ORAL DAILY
Refills: 0 | Status: DISCONTINUED | OUTPATIENT
Start: 2024-01-01 | End: 2024-01-01

## 2024-01-01 RX ORDER — SODIUM CHLORIDE 9 MG/ML
2 INJECTION INTRAMUSCULAR; INTRAVENOUS; SUBCUTANEOUS ONCE
Refills: 0 | Status: COMPLETED | OUTPATIENT
Start: 2024-01-01 | End: 2024-01-01

## 2024-01-01 RX ORDER — METOPROLOL TARTRATE 50 MG
1 TABLET ORAL
Refills: 0 | DISCHARGE

## 2024-01-01 RX ORDER — SODIUM CHLORIDE 9 MG/ML
2200 INJECTION INTRAMUSCULAR; INTRAVENOUS; SUBCUTANEOUS ONCE
Refills: 0 | Status: COMPLETED | OUTPATIENT
Start: 2024-01-01 | End: 2024-01-01

## 2024-01-01 RX ORDER — INSULIN GLARGINE 100 [IU]/ML
8 INJECTION, SOLUTION SUBCUTANEOUS AT BEDTIME
Refills: 0 | Status: DISCONTINUED | OUTPATIENT
Start: 2024-01-01 | End: 2024-01-01

## 2024-01-01 RX ORDER — ALBUTEROL 90 UG/1
1 AEROSOL, METERED ORAL EVERY 4 HOURS
Refills: 0 | Status: DISCONTINUED | OUTPATIENT
Start: 2024-01-01 | End: 2024-01-01

## 2024-01-01 RX ORDER — INSULIN LISPRO 100/ML
VIAL (ML) SUBCUTANEOUS
Refills: 0 | Status: DISCONTINUED | OUTPATIENT
Start: 2024-01-01 | End: 2024-01-01

## 2024-01-01 RX ORDER — GLUCAGON INJECTION, SOLUTION 0.5 MG/.1ML
1 INJECTION, SOLUTION SUBCUTANEOUS ONCE
Refills: 0 | Status: DISCONTINUED | OUTPATIENT
Start: 2024-01-01 | End: 2024-01-01

## 2024-01-01 RX ORDER — DEXTROSE 50 % IN WATER 50 %
12.5 SYRINGE (ML) INTRAVENOUS ONCE
Refills: 0 | Status: DISCONTINUED | OUTPATIENT
Start: 2024-01-01 | End: 2024-01-01

## 2024-01-01 RX ORDER — FUROSEMIDE 40 MG
20 TABLET ORAL DAILY
Refills: 0 | Status: DISCONTINUED | OUTPATIENT
Start: 2024-01-01 | End: 2024-01-01

## 2024-01-01 RX ORDER — DEMECLOCYCLINE HCL 150 MG
300 TABLET ORAL
Refills: 0 | Status: DISCONTINUED | OUTPATIENT
Start: 2024-01-01 | End: 2024-01-01

## 2024-01-01 RX ORDER — ASPIRIN/CALCIUM CARB/MAGNESIUM 324 MG
81 TABLET ORAL DAILY
Refills: 0 | Status: DISCONTINUED | OUTPATIENT
Start: 2024-01-01 | End: 2024-01-01

## 2024-01-01 RX ORDER — FLUTICASONE FUROATE, UMECLIDINIUM BROMIDE AND VILANTEROL TRIFENATATE 200; 62.5; 25 UG/1; UG/1; UG/1
1 POWDER RESPIRATORY (INHALATION)
Refills: 0 | DISCHARGE

## 2024-01-01 RX ORDER — ATORVASTATIN CALCIUM 80 MG/1
40 TABLET, FILM COATED ORAL AT BEDTIME
Refills: 0 | Status: DISCONTINUED | OUTPATIENT
Start: 2024-01-01 | End: 2024-01-01

## 2024-01-01 RX ORDER — MULTIVIT-MIN/FERROUS GLUCONATE 9 MG/15 ML
1 LIQUID (ML) ORAL DAILY
Refills: 0 | Status: DISCONTINUED | OUTPATIENT
Start: 2024-01-01 | End: 2024-01-01

## 2024-01-01 RX ORDER — FUROSEMIDE 40 MG
20 TABLET ORAL ONCE
Refills: 0 | Status: COMPLETED | OUTPATIENT
Start: 2024-01-01 | End: 2024-01-01

## 2024-01-01 RX ORDER — METOPROLOL TARTRATE 50 MG
25 TABLET ORAL DAILY
Refills: 0 | Status: DISCONTINUED | OUTPATIENT
Start: 2024-01-01 | End: 2024-01-01

## 2024-01-01 RX ORDER — CEFTRIAXONE 500 MG/1
1000 INJECTION, POWDER, FOR SOLUTION INTRAMUSCULAR; INTRAVENOUS ONCE
Refills: 0 | Status: COMPLETED | OUTPATIENT
Start: 2024-01-01 | End: 2024-01-01

## 2024-01-01 RX ORDER — ACETAMINOPHEN 500 MG
1000 TABLET ORAL ONCE
Refills: 0 | Status: COMPLETED | OUTPATIENT
Start: 2024-01-01 | End: 2024-01-01

## 2024-01-01 RX ORDER — MORPHINE SULFATE 50 MG/1
2 CAPSULE, EXTENDED RELEASE ORAL
Refills: 0 | Status: DISCONTINUED | OUTPATIENT
Start: 2024-01-01 | End: 2024-01-01

## 2024-01-01 RX ORDER — INSULIN LISPRO 100/ML
2 VIAL (ML) SUBCUTANEOUS
Refills: 0 | Status: DISCONTINUED | OUTPATIENT
Start: 2024-01-01 | End: 2024-01-01

## 2024-01-01 RX ORDER — ONDANSETRON 8 MG/1
4 TABLET, FILM COATED ORAL EVERY 8 HOURS
Refills: 0 | Status: DISCONTINUED | OUTPATIENT
Start: 2024-01-01 | End: 2024-01-01

## 2024-01-01 RX ORDER — INSULIN LISPRO 100/ML
VIAL (ML) SUBCUTANEOUS AT BEDTIME
Refills: 0 | Status: DISCONTINUED | OUTPATIENT
Start: 2024-01-01 | End: 2024-01-01

## 2024-01-01 RX ORDER — POLYETHYLENE GLYCOL 3350 17 G/17G
17 POWDER, FOR SOLUTION ORAL DAILY
Refills: 0 | Status: DISCONTINUED | OUTPATIENT
Start: 2024-01-01 | End: 2024-01-01

## 2024-01-01 RX ORDER — DEXTROSE 50 % IN WATER 50 %
25 SYRINGE (ML) INTRAVENOUS ONCE
Refills: 0 | Status: DISCONTINUED | OUTPATIENT
Start: 2024-01-01 | End: 2024-01-01

## 2024-01-01 RX ORDER — ENOXAPARIN SODIUM 100 MG/ML
40 INJECTION SUBCUTANEOUS EVERY 24 HOURS
Refills: 0 | Status: DISCONTINUED | OUTPATIENT
Start: 2024-01-01 | End: 2024-01-01

## 2024-01-01 RX ADMIN — ALBUTEROL 2.5 MILLIGRAM(S): 90 AEROSOL, METERED ORAL at 13:52

## 2024-01-01 RX ADMIN — Medication 1: at 17:07

## 2024-01-01 RX ADMIN — ATORVASTATIN CALCIUM 40 MILLIGRAM(S): 80 TABLET, FILM COATED ORAL at 21:56

## 2024-01-01 RX ADMIN — Medication 2 UNIT(S): at 08:26

## 2024-01-01 RX ADMIN — Medication 100 MILLIGRAM(S): at 21:57

## 2024-01-01 RX ADMIN — Medication 2 UNIT(S): at 17:09

## 2024-01-01 RX ADMIN — Medication 25 MILLIGRAM(S): at 10:22

## 2024-01-01 RX ADMIN — Medication 81 MILLIGRAM(S): at 09:56

## 2024-01-01 RX ADMIN — ALBUTEROL 2.5 MILLIGRAM(S): 90 AEROSOL, METERED ORAL at 13:25

## 2024-01-01 RX ADMIN — Medication 1 LOZENGE: at 15:28

## 2024-01-01 RX ADMIN — Medication 2 UNIT(S): at 17:58

## 2024-01-01 RX ADMIN — Medication 25 MILLIGRAM(S): at 08:51

## 2024-01-01 RX ADMIN — SODIUM CHLORIDE 2 GRAM(S): 9 INJECTION INTRAMUSCULAR; INTRAVENOUS; SUBCUTANEOUS at 05:45

## 2024-01-01 RX ADMIN — Medication 81 MILLIGRAM(S): at 08:51

## 2024-01-01 RX ADMIN — SODIUM CHLORIDE 2 GRAM(S): 9 INJECTION INTRAMUSCULAR; INTRAVENOUS; SUBCUTANEOUS at 17:00

## 2024-01-01 RX ADMIN — Medication 1 LOZENGE: at 08:27

## 2024-01-01 RX ADMIN — BUDESONIDE AND FORMOTEROL FUMARATE DIHYDRATE 2 PUFF(S): 160; 4.5 AEROSOL RESPIRATORY (INHALATION) at 10:06

## 2024-01-01 RX ADMIN — Medication 1 TABLET(S): at 11:09

## 2024-01-01 RX ADMIN — ALBUTEROL 2.5 MILLIGRAM(S): 90 AEROSOL, METERED ORAL at 01:54

## 2024-01-01 RX ADMIN — Medication 3 UNIT(S): at 17:12

## 2024-01-01 RX ADMIN — BUDESONIDE AND FORMOTEROL FUMARATE DIHYDRATE 2 PUFF(S): 160; 4.5 AEROSOL RESPIRATORY (INHALATION) at 07:52

## 2024-01-01 RX ADMIN — BUDESONIDE AND FORMOTEROL FUMARATE DIHYDRATE 2 PUFF(S): 160; 4.5 AEROSOL RESPIRATORY (INHALATION) at 20:20

## 2024-01-01 RX ADMIN — ATORVASTATIN CALCIUM 40 MILLIGRAM(S): 80 TABLET, FILM COATED ORAL at 21:53

## 2024-01-01 RX ADMIN — CEFEPIME 2000 MILLIGRAM(S): 1 INJECTION, POWDER, FOR SOLUTION INTRAMUSCULAR; INTRAVENOUS at 13:43

## 2024-01-01 RX ADMIN — Medication 100 MILLIGRAM(S): at 09:56

## 2024-01-01 RX ADMIN — CEFEPIME 2000 MILLIGRAM(S): 1 INJECTION, POWDER, FOR SOLUTION INTRAMUSCULAR; INTRAVENOUS at 06:04

## 2024-01-01 RX ADMIN — Medication 100 MILLIGRAM(S): at 21:28

## 2024-01-01 RX ADMIN — Medication 40 MILLIGRAM(S): at 05:17

## 2024-01-01 RX ADMIN — ALBUTEROL 2.5 MILLIGRAM(S): 90 AEROSOL, METERED ORAL at 20:12

## 2024-01-01 RX ADMIN — ATORVASTATIN CALCIUM 40 MILLIGRAM(S): 80 TABLET, FILM COATED ORAL at 22:57

## 2024-01-01 RX ADMIN — Medication 81 MILLIGRAM(S): at 10:36

## 2024-01-01 RX ADMIN — Medication 2: at 11:38

## 2024-01-01 RX ADMIN — Medication 1 LOZENGE: at 05:44

## 2024-01-01 RX ADMIN — TIOTROPIUM BROMIDE 2 PUFF(S): 18 CAPSULE ORAL; RESPIRATORY (INHALATION) at 09:03

## 2024-01-01 RX ADMIN — Medication 30 MILLIGRAM(S): at 08:50

## 2024-01-01 RX ADMIN — Medication 40 MILLIGRAM(S): at 21:44

## 2024-01-01 RX ADMIN — ATORVASTATIN CALCIUM 40 MILLIGRAM(S): 80 TABLET, FILM COATED ORAL at 21:43

## 2024-01-01 RX ADMIN — TIOTROPIUM BROMIDE 2 PUFF(S): 18 CAPSULE ORAL; RESPIRATORY (INHALATION) at 08:09

## 2024-01-01 RX ADMIN — Medication 40 MILLIGRAM(S): at 09:12

## 2024-01-01 RX ADMIN — Medication 1 LOZENGE: at 21:28

## 2024-01-01 RX ADMIN — TIOTROPIUM BROMIDE 2 PUFF(S): 18 CAPSULE ORAL; RESPIRATORY (INHALATION) at 10:06

## 2024-01-01 RX ADMIN — Medication 2 UNIT(S): at 12:13

## 2024-01-01 RX ADMIN — LISINOPRIL 10 MILLIGRAM(S): 2.5 TABLET ORAL at 11:09

## 2024-01-01 RX ADMIN — Medication 40 MILLIGRAM(S): at 11:24

## 2024-01-01 RX ADMIN — SODIUM CHLORIDE 50 MILLILITER(S): 5 INJECTION, SOLUTION INTRAVENOUS at 13:43

## 2024-01-01 RX ADMIN — Medication 1 TABLET(S): at 10:21

## 2024-01-01 RX ADMIN — TIOTROPIUM BROMIDE 2 PUFF(S): 18 CAPSULE ORAL; RESPIRATORY (INHALATION) at 14:06

## 2024-01-01 RX ADMIN — CEFEPIME 2000 MILLIGRAM(S): 1 INJECTION, POWDER, FOR SOLUTION INTRAMUSCULAR; INTRAVENOUS at 22:21

## 2024-01-01 RX ADMIN — Medication 40 MILLIGRAM(S): at 09:42

## 2024-01-01 RX ADMIN — ALBUTEROL 2.5 MILLIGRAM(S): 90 AEROSOL, METERED ORAL at 20:16

## 2024-01-01 RX ADMIN — ALBUTEROL 2.5 MILLIGRAM(S): 90 AEROSOL, METERED ORAL at 07:17

## 2024-01-01 RX ADMIN — Medication 40 MILLIGRAM(S): at 06:04

## 2024-01-01 RX ADMIN — Medication 25 MILLIGRAM(S): at 09:39

## 2024-01-01 RX ADMIN — Medication 100 MILLIGRAM(S): at 22:58

## 2024-01-01 RX ADMIN — ALBUTEROL 2.5 MILLIGRAM(S): 90 AEROSOL, METERED ORAL at 01:35

## 2024-01-01 RX ADMIN — ATORVASTATIN CALCIUM 40 MILLIGRAM(S): 80 TABLET, FILM COATED ORAL at 21:24

## 2024-01-01 RX ADMIN — ALBUTEROL 2.5 MILLIGRAM(S): 90 AEROSOL, METERED ORAL at 14:01

## 2024-01-01 RX ADMIN — Medication 40 MILLIGRAM(S): at 19:37

## 2024-01-01 RX ADMIN — BUDESONIDE AND FORMOTEROL FUMARATE DIHYDRATE 2 PUFF(S): 160; 4.5 AEROSOL RESPIRATORY (INHALATION) at 20:37

## 2024-01-01 RX ADMIN — Medication 2 UNIT(S): at 17:27

## 2024-01-01 RX ADMIN — AZITHROMYCIN 255 MILLIGRAM(S): 500 TABLET, FILM COATED ORAL at 10:31

## 2024-01-01 RX ADMIN — CEFEPIME 2000 MILLIGRAM(S): 1 INJECTION, POWDER, FOR SOLUTION INTRAMUSCULAR; INTRAVENOUS at 06:13

## 2024-01-01 RX ADMIN — INSULIN GLARGINE 5 UNIT(S): 100 INJECTION, SOLUTION SUBCUTANEOUS at 08:20

## 2024-01-01 RX ADMIN — Medication 1 LOZENGE: at 05:16

## 2024-01-01 RX ADMIN — Medication 40 MILLIGRAM(S): at 20:41

## 2024-01-01 RX ADMIN — BUDESONIDE AND FORMOTEROL FUMARATE DIHYDRATE 2 PUFF(S): 160; 4.5 AEROSOL RESPIRATORY (INHALATION) at 08:18

## 2024-01-01 RX ADMIN — Medication 3: at 12:05

## 2024-01-01 RX ADMIN — SODIUM CHLORIDE 100 MILLILITER(S): 9 INJECTION, SOLUTION INTRAVENOUS at 22:58

## 2024-01-01 RX ADMIN — CEFEPIME 2000 MILLIGRAM(S): 1 INJECTION, POWDER, FOR SOLUTION INTRAMUSCULAR; INTRAVENOUS at 13:56

## 2024-01-01 RX ADMIN — CEFEPIME 2000 MILLIGRAM(S): 1 INJECTION, POWDER, FOR SOLUTION INTRAMUSCULAR; INTRAVENOUS at 13:49

## 2024-01-01 RX ADMIN — TIOTROPIUM BROMIDE 2 PUFF(S): 18 CAPSULE ORAL; RESPIRATORY (INHALATION) at 08:04

## 2024-01-01 RX ADMIN — Medication 25 MILLIGRAM(S): at 11:37

## 2024-01-01 RX ADMIN — SENNA PLUS 2 TABLET(S): 8.6 TABLET ORAL at 23:11

## 2024-01-01 RX ADMIN — Medication 3: at 12:18

## 2024-01-01 RX ADMIN — Medication 100 MILLIGRAM(S): at 11:36

## 2024-01-01 RX ADMIN — Medication 2 UNIT(S): at 08:27

## 2024-01-01 RX ADMIN — LISINOPRIL 10 MILLIGRAM(S): 2.5 TABLET ORAL at 08:41

## 2024-01-01 RX ADMIN — BUDESONIDE AND FORMOTEROL FUMARATE DIHYDRATE 2 PUFF(S): 160; 4.5 AEROSOL RESPIRATORY (INHALATION) at 20:35

## 2024-01-01 RX ADMIN — CEFEPIME 2000 MILLIGRAM(S): 1 INJECTION, POWDER, FOR SOLUTION INTRAMUSCULAR; INTRAVENOUS at 22:36

## 2024-01-01 RX ADMIN — Medication 2 UNIT(S): at 11:38

## 2024-01-01 RX ADMIN — Medication 1: at 21:45

## 2024-01-01 RX ADMIN — Medication 1: at 16:48

## 2024-01-01 RX ADMIN — INSULIN GLARGINE 5 UNIT(S): 100 INJECTION, SOLUTION SUBCUTANEOUS at 10:07

## 2024-01-01 RX ADMIN — Medication 1 LOZENGE: at 06:03

## 2024-01-01 RX ADMIN — Medication 2 UNIT(S): at 17:01

## 2024-01-01 RX ADMIN — SODIUM CHLORIDE 1 GRAM(S): 9 INJECTION INTRAMUSCULAR; INTRAVENOUS; SUBCUTANEOUS at 22:01

## 2024-01-01 RX ADMIN — Medication 25 MILLIGRAM(S): at 10:51

## 2024-01-01 RX ADMIN — LISINOPRIL 10 MILLIGRAM(S): 2.5 TABLET ORAL at 10:07

## 2024-01-01 RX ADMIN — ALBUTEROL 2.5 MILLIGRAM(S): 90 AEROSOL, METERED ORAL at 21:15

## 2024-01-01 RX ADMIN — ENOXAPARIN SODIUM 40 MILLIGRAM(S): 100 INJECTION SUBCUTANEOUS at 22:21

## 2024-01-01 RX ADMIN — ALBUTEROL 2.5 MILLIGRAM(S): 90 AEROSOL, METERED ORAL at 10:06

## 2024-01-01 RX ADMIN — SODIUM CHLORIDE 1 GRAM(S): 9 INJECTION INTRAMUSCULAR; INTRAVENOUS; SUBCUTANEOUS at 05:36

## 2024-01-01 RX ADMIN — Medication 2: at 17:13

## 2024-01-01 RX ADMIN — Medication 2 UNIT(S): at 12:24

## 2024-01-01 RX ADMIN — Medication 40 MILLIGRAM(S): at 06:27

## 2024-01-01 RX ADMIN — INSULIN GLARGINE 10 UNIT(S): 100 INJECTION, SOLUTION SUBCUTANEOUS at 08:39

## 2024-01-01 RX ADMIN — Medication 1 LOZENGE: at 05:45

## 2024-01-01 RX ADMIN — Medication 1: at 17:26

## 2024-01-01 RX ADMIN — Medication 25 MILLIGRAM(S): at 10:55

## 2024-01-01 RX ADMIN — Medication 1 LOZENGE: at 17:14

## 2024-01-01 RX ADMIN — Medication 40 MILLIGRAM(S): at 08:41

## 2024-01-01 RX ADMIN — Medication 1 LOZENGE: at 22:00

## 2024-01-01 RX ADMIN — Medication 81 MILLIGRAM(S): at 09:52

## 2024-01-01 RX ADMIN — Medication 3 UNIT(S): at 08:33

## 2024-01-01 RX ADMIN — Medication 1 LOZENGE: at 23:10

## 2024-01-01 RX ADMIN — ATORVASTATIN CALCIUM 40 MILLIGRAM(S): 80 TABLET, FILM COATED ORAL at 22:21

## 2024-01-01 RX ADMIN — BUDESONIDE AND FORMOTEROL FUMARATE DIHYDRATE 2 PUFF(S): 160; 4.5 AEROSOL RESPIRATORY (INHALATION) at 20:11

## 2024-01-01 RX ADMIN — Medication 2 UNIT(S): at 17:07

## 2024-01-01 RX ADMIN — Medication 40 MILLIGRAM(S): at 09:38

## 2024-01-01 RX ADMIN — ATORVASTATIN CALCIUM 40 MILLIGRAM(S): 80 TABLET, FILM COATED ORAL at 23:11

## 2024-01-01 RX ADMIN — Medication 2 UNIT(S): at 16:49

## 2024-01-01 RX ADMIN — LISINOPRIL 10 MILLIGRAM(S): 2.5 TABLET ORAL at 10:24

## 2024-01-01 RX ADMIN — Medication 1 LOZENGE: at 21:24

## 2024-01-01 RX ADMIN — Medication 100 MILLIGRAM(S): at 00:52

## 2024-01-01 RX ADMIN — LISINOPRIL 10 MILLIGRAM(S): 2.5 TABLET ORAL at 09:42

## 2024-01-01 RX ADMIN — Medication 1 LOZENGE: at 14:23

## 2024-01-01 RX ADMIN — ALBUTEROL 2.5 MILLIGRAM(S): 90 AEROSOL, METERED ORAL at 14:03

## 2024-01-01 RX ADMIN — Medication 1 LOZENGE: at 13:26

## 2024-01-01 RX ADMIN — Medication 40 MILLIGRAM(S): at 21:43

## 2024-01-01 RX ADMIN — Medication 40 MILLIGRAM(S): at 13:12

## 2024-01-01 RX ADMIN — Medication 40 MILLIGRAM(S): at 09:52

## 2024-01-01 RX ADMIN — SODIUM CHLORIDE 1 GRAM(S): 9 INJECTION INTRAMUSCULAR; INTRAVENOUS; SUBCUTANEOUS at 22:13

## 2024-01-01 RX ADMIN — INSULIN GLARGINE 8 UNIT(S): 100 INJECTION, SOLUTION SUBCUTANEOUS at 23:10

## 2024-01-01 RX ADMIN — ALBUTEROL 2.5 MILLIGRAM(S): 90 AEROSOL, METERED ORAL at 11:06

## 2024-01-01 RX ADMIN — INSULIN GLARGINE 8 UNIT(S): 100 INJECTION, SOLUTION SUBCUTANEOUS at 22:58

## 2024-01-01 RX ADMIN — Medication 1 LOZENGE: at 14:27

## 2024-01-01 RX ADMIN — Medication 40 MILLIGRAM(S): at 10:48

## 2024-01-01 RX ADMIN — Medication 25 MILLIGRAM(S): at 08:41

## 2024-01-01 RX ADMIN — Medication 2: at 12:13

## 2024-01-01 RX ADMIN — SODIUM CHLORIDE 1 GRAM(S): 9 INJECTION INTRAMUSCULAR; INTRAVENOUS; SUBCUTANEOUS at 06:05

## 2024-01-01 RX ADMIN — TIOTROPIUM BROMIDE 2 PUFF(S): 18 CAPSULE ORAL; RESPIRATORY (INHALATION) at 10:54

## 2024-01-01 RX ADMIN — ALBUTEROL 2.5 MILLIGRAM(S): 90 AEROSOL, METERED ORAL at 01:23

## 2024-01-01 RX ADMIN — ENOXAPARIN SODIUM 40 MILLIGRAM(S): 100 INJECTION SUBCUTANEOUS at 21:30

## 2024-01-01 RX ADMIN — Medication 25 MILLIGRAM(S): at 10:00

## 2024-01-01 RX ADMIN — Medication 81 MILLIGRAM(S): at 10:48

## 2024-01-01 RX ADMIN — Medication 40 MILLIGRAM(S): at 17:36

## 2024-01-01 RX ADMIN — SODIUM CHLORIDE 1 GRAM(S): 9 INJECTION INTRAMUSCULAR; INTRAVENOUS; SUBCUTANEOUS at 17:56

## 2024-01-01 RX ADMIN — ATORVASTATIN CALCIUM 40 MILLIGRAM(S): 80 TABLET, FILM COATED ORAL at 22:53

## 2024-01-01 RX ADMIN — Medication 81 MILLIGRAM(S): at 09:42

## 2024-01-01 RX ADMIN — Medication 1: at 08:16

## 2024-01-01 RX ADMIN — TIOTROPIUM BROMIDE 2 PUFF(S): 18 CAPSULE ORAL; RESPIRATORY (INHALATION) at 09:28

## 2024-01-01 RX ADMIN — Medication 1 LOZENGE: at 22:22

## 2024-01-01 RX ADMIN — ALBUTEROL 2.5 MILLIGRAM(S): 90 AEROSOL, METERED ORAL at 13:33

## 2024-01-01 RX ADMIN — INSULIN GLARGINE 8 UNIT(S): 100 INJECTION, SOLUTION SUBCUTANEOUS at 22:19

## 2024-01-01 RX ADMIN — ALBUTEROL 2.5 MILLIGRAM(S): 90 AEROSOL, METERED ORAL at 09:41

## 2024-01-01 RX ADMIN — Medication 81 MILLIGRAM(S): at 08:41

## 2024-01-01 RX ADMIN — BUDESONIDE AND FORMOTEROL FUMARATE DIHYDRATE 2 PUFF(S): 160; 4.5 AEROSOL RESPIRATORY (INHALATION) at 11:15

## 2024-01-01 RX ADMIN — Medication 1: at 13:13

## 2024-01-01 RX ADMIN — Medication 40 MILLIGRAM(S): at 21:53

## 2024-01-01 RX ADMIN — ALBUTEROL 2.5 MILLIGRAM(S): 90 AEROSOL, METERED ORAL at 07:51

## 2024-01-01 RX ADMIN — Medication 1 LOZENGE: at 17:00

## 2024-01-01 RX ADMIN — BUDESONIDE AND FORMOTEROL FUMARATE DIHYDRATE 2 PUFF(S): 160; 4.5 AEROSOL RESPIRATORY (INHALATION) at 20:30

## 2024-01-01 RX ADMIN — Medication 100 MILLIGRAM(S): at 09:43

## 2024-01-01 RX ADMIN — ALBUTEROL 2.5 MILLIGRAM(S): 90 AEROSOL, METERED ORAL at 07:28

## 2024-01-01 RX ADMIN — ALBUTEROL 2.5 MILLIGRAM(S): 90 AEROSOL, METERED ORAL at 20:36

## 2024-01-01 RX ADMIN — Medication 1: at 17:33

## 2024-01-01 RX ADMIN — Medication 1 LOZENGE: at 13:45

## 2024-01-01 RX ADMIN — Medication 25 MILLIGRAM(S): at 09:42

## 2024-01-01 RX ADMIN — Medication 3: at 12:46

## 2024-01-01 RX ADMIN — CEFEPIME 2000 MILLIGRAM(S): 1 INJECTION, POWDER, FOR SOLUTION INTRAMUSCULAR; INTRAVENOUS at 20:40

## 2024-01-01 RX ADMIN — ATORVASTATIN CALCIUM 40 MILLIGRAM(S): 80 TABLET, FILM COATED ORAL at 21:34

## 2024-01-01 RX ADMIN — LISINOPRIL 10 MILLIGRAM(S): 2.5 TABLET ORAL at 09:37

## 2024-01-01 RX ADMIN — Medication 81 MILLIGRAM(S): at 11:37

## 2024-01-01 RX ADMIN — ALBUTEROL 2.5 MILLIGRAM(S): 90 AEROSOL, METERED ORAL at 01:47

## 2024-01-01 RX ADMIN — Medication 40 MILLIGRAM(S): at 10:36

## 2024-01-01 RX ADMIN — ALBUTEROL 2.5 MILLIGRAM(S): 90 AEROSOL, METERED ORAL at 08:15

## 2024-01-01 RX ADMIN — ALBUTEROL 2.5 MILLIGRAM(S): 90 AEROSOL, METERED ORAL at 09:24

## 2024-01-01 RX ADMIN — SODIUM CHLORIDE 1 GRAM(S): 9 INJECTION INTRAMUSCULAR; INTRAVENOUS; SUBCUTANEOUS at 12:18

## 2024-01-01 RX ADMIN — Medication 1 LOZENGE: at 13:40

## 2024-01-01 RX ADMIN — ALBUTEROL 2.5 MILLIGRAM(S): 90 AEROSOL, METERED ORAL at 16:13

## 2024-01-01 RX ADMIN — Medication 1 LOZENGE: at 06:15

## 2024-01-01 RX ADMIN — CEFEPIME 2000 MILLIGRAM(S): 1 INJECTION, POWDER, FOR SOLUTION INTRAMUSCULAR; INTRAVENOUS at 22:03

## 2024-01-01 RX ADMIN — Medication 25 MILLIGRAM(S): at 09:25

## 2024-01-01 RX ADMIN — Medication 2: at 17:01

## 2024-01-01 RX ADMIN — BUDESONIDE AND FORMOTEROL FUMARATE DIHYDRATE 2 PUFF(S): 160; 4.5 AEROSOL RESPIRATORY (INHALATION) at 09:03

## 2024-01-01 RX ADMIN — Medication 1 LOZENGE: at 05:37

## 2024-01-01 RX ADMIN — Medication 1 LOZENGE: at 13:08

## 2024-01-01 RX ADMIN — Medication 1: at 08:40

## 2024-01-01 RX ADMIN — INSULIN GLARGINE 5 UNIT(S): 100 INJECTION, SOLUTION SUBCUTANEOUS at 08:52

## 2024-01-01 RX ADMIN — Medication 1 LOZENGE: at 21:42

## 2024-01-01 RX ADMIN — CEFEPIME 2000 MILLIGRAM(S): 1 INJECTION, POWDER, FOR SOLUTION INTRAMUSCULAR; INTRAVENOUS at 05:37

## 2024-01-01 RX ADMIN — ENOXAPARIN SODIUM 40 MILLIGRAM(S): 100 INJECTION SUBCUTANEOUS at 20:26

## 2024-01-01 RX ADMIN — ALBUTEROL 2.5 MILLIGRAM(S): 90 AEROSOL, METERED ORAL at 13:56

## 2024-01-01 RX ADMIN — Medication 1 LOZENGE: at 05:30

## 2024-01-01 RX ADMIN — POLYETHYLENE GLYCOL 3350 17 GRAM(S): 17 POWDER, FOR SOLUTION ORAL at 11:44

## 2024-01-01 RX ADMIN — CEFEPIME 2000 MILLIGRAM(S): 1 INJECTION, POWDER, FOR SOLUTION INTRAMUSCULAR; INTRAVENOUS at 05:30

## 2024-01-01 RX ADMIN — INSULIN GLARGINE 5 UNIT(S): 100 INJECTION, SOLUTION SUBCUTANEOUS at 11:34

## 2024-01-01 RX ADMIN — Medication 2 UNIT(S): at 08:11

## 2024-01-01 RX ADMIN — ALBUTEROL 2.5 MILLIGRAM(S): 90 AEROSOL, METERED ORAL at 20:20

## 2024-01-01 RX ADMIN — Medication 81 MILLIGRAM(S): at 10:51

## 2024-01-01 RX ADMIN — MORPHINE SULFATE 5 MILLIGRAM(S): 50 CAPSULE, EXTENDED RELEASE ORAL at 13:47

## 2024-01-01 RX ADMIN — Medication 100 MILLIGRAM(S): at 09:08

## 2024-01-01 RX ADMIN — LISINOPRIL 10 MILLIGRAM(S): 2.5 TABLET ORAL at 11:36

## 2024-01-01 RX ADMIN — Medication 2 UNIT(S): at 17:34

## 2024-01-01 RX ADMIN — ENOXAPARIN SODIUM 40 MILLIGRAM(S): 100 INJECTION SUBCUTANEOUS at 22:01

## 2024-01-01 RX ADMIN — BUDESONIDE AND FORMOTEROL FUMARATE DIHYDRATE 2 PUFF(S): 160; 4.5 AEROSOL RESPIRATORY (INHALATION) at 20:07

## 2024-01-01 RX ADMIN — LISINOPRIL 10 MILLIGRAM(S): 2.5 TABLET ORAL at 10:53

## 2024-01-01 RX ADMIN — Medication 100 MILLIGRAM(S): at 23:14

## 2024-01-01 RX ADMIN — ALBUTEROL 2.5 MILLIGRAM(S): 90 AEROSOL, METERED ORAL at 07:42

## 2024-01-01 RX ADMIN — ALBUTEROL 2.5 MILLIGRAM(S): 90 AEROSOL, METERED ORAL at 21:09

## 2024-01-01 RX ADMIN — SODIUM CHLORIDE 100 MILLILITER(S): 9 INJECTION, SOLUTION INTRAVENOUS at 15:28

## 2024-01-01 RX ADMIN — SODIUM CHLORIDE 1 GRAM(S): 9 INJECTION INTRAMUSCULAR; INTRAVENOUS; SUBCUTANEOUS at 23:11

## 2024-01-01 RX ADMIN — LISINOPRIL 10 MILLIGRAM(S): 2.5 TABLET ORAL at 09:09

## 2024-01-01 RX ADMIN — Medication 2: at 17:19

## 2024-01-01 RX ADMIN — Medication 40 MILLIGRAM(S): at 13:45

## 2024-01-01 RX ADMIN — Medication 100 MILLIGRAM(S): at 22:21

## 2024-01-01 RX ADMIN — Medication 3 MILLIGRAM(S): at 22:13

## 2024-01-01 RX ADMIN — Medication 40 MILLIGRAM(S): at 17:43

## 2024-01-01 RX ADMIN — CEFEPIME 2000 MILLIGRAM(S): 1 INJECTION, POWDER, FOR SOLUTION INTRAMUSCULAR; INTRAVENOUS at 19:44

## 2024-01-01 RX ADMIN — Medication 2 UNIT(S): at 17:35

## 2024-01-01 RX ADMIN — Medication 100 MILLIGRAM(S): at 12:07

## 2024-01-01 RX ADMIN — BUDESONIDE AND FORMOTEROL FUMARATE DIHYDRATE 2 PUFF(S): 160; 4.5 AEROSOL RESPIRATORY (INHALATION) at 20:15

## 2024-01-01 RX ADMIN — Medication 1 LOZENGE: at 20:27

## 2024-01-01 RX ADMIN — Medication 1 LOZENGE: at 06:28

## 2024-01-01 RX ADMIN — LISINOPRIL 10 MILLIGRAM(S): 2.5 TABLET ORAL at 09:11

## 2024-01-01 RX ADMIN — ALBUTEROL 2.5 MILLIGRAM(S): 90 AEROSOL, METERED ORAL at 08:34

## 2024-01-01 RX ADMIN — BUDESONIDE AND FORMOTEROL FUMARATE DIHYDRATE 2 PUFF(S): 160; 4.5 AEROSOL RESPIRATORY (INHALATION) at 07:17

## 2024-01-01 RX ADMIN — ENOXAPARIN SODIUM 40 MILLIGRAM(S): 100 INJECTION SUBCUTANEOUS at 22:24

## 2024-01-01 RX ADMIN — Medication 1 LOZENGE: at 05:34

## 2024-01-01 RX ADMIN — Medication 3 MILLIGRAM(S): at 23:12

## 2024-01-01 RX ADMIN — BUDESONIDE AND FORMOTEROL FUMARATE DIHYDRATE 2 PUFF(S): 160; 4.5 AEROSOL RESPIRATORY (INHALATION) at 20:14

## 2024-01-01 RX ADMIN — ENOXAPARIN SODIUM 40 MILLIGRAM(S): 100 INJECTION SUBCUTANEOUS at 21:36

## 2024-01-01 RX ADMIN — Medication 1 LOZENGE: at 19:38

## 2024-01-01 RX ADMIN — ALBUTEROL 2.5 MILLIGRAM(S): 90 AEROSOL, METERED ORAL at 13:43

## 2024-01-01 RX ADMIN — CEFEPIME 2000 MILLIGRAM(S): 1 INJECTION, POWDER, FOR SOLUTION INTRAMUSCULAR; INTRAVENOUS at 05:43

## 2024-01-01 RX ADMIN — Medication 1 LOZENGE: at 13:56

## 2024-01-01 RX ADMIN — Medication 40 MILLIGRAM(S): at 21:23

## 2024-01-01 RX ADMIN — BUDESONIDE AND FORMOTEROL FUMARATE DIHYDRATE 2 PUFF(S): 160; 4.5 AEROSOL RESPIRATORY (INHALATION) at 11:07

## 2024-01-01 RX ADMIN — Medication 25 MILLIGRAM(S): at 09:52

## 2024-01-01 RX ADMIN — Medication 1: at 17:28

## 2024-01-01 RX ADMIN — Medication 10 MILLIEQUIVALENT(S): at 10:53

## 2024-01-01 RX ADMIN — BUDESONIDE AND FORMOTEROL FUMARATE DIHYDRATE 2 PUFF(S): 160; 4.5 AEROSOL RESPIRATORY (INHALATION) at 08:01

## 2024-01-01 RX ADMIN — ALBUTEROL 2.5 MILLIGRAM(S): 90 AEROSOL, METERED ORAL at 08:04

## 2024-01-01 RX ADMIN — ATORVASTATIN CALCIUM 40 MILLIGRAM(S): 80 TABLET, FILM COATED ORAL at 21:28

## 2024-01-01 RX ADMIN — INSULIN GLARGINE 5 UNIT(S): 100 INJECTION, SOLUTION SUBCUTANEOUS at 08:17

## 2024-01-01 RX ADMIN — Medication 4: at 12:53

## 2024-01-01 RX ADMIN — CEFEPIME 2000 MILLIGRAM(S): 1 INJECTION, POWDER, FOR SOLUTION INTRAMUSCULAR; INTRAVENOUS at 14:34

## 2024-01-01 RX ADMIN — Medication 1: at 08:45

## 2024-01-01 RX ADMIN — Medication 40 MILLIGRAM(S): at 22:00

## 2024-01-01 RX ADMIN — Medication 100 MILLIGRAM(S): at 08:41

## 2024-01-01 RX ADMIN — Medication 2 UNIT(S): at 08:18

## 2024-01-01 RX ADMIN — Medication 81 MILLIGRAM(S): at 11:36

## 2024-01-01 RX ADMIN — Medication 40 MILLIGRAM(S): at 13:51

## 2024-01-01 RX ADMIN — Medication 40 MILLIGRAM(S): at 05:36

## 2024-01-01 RX ADMIN — MORPHINE SULFATE 5 MILLIGRAM(S): 50 CAPSULE, EXTENDED RELEASE ORAL at 14:03

## 2024-01-01 RX ADMIN — Medication 1 LOZENGE: at 06:30

## 2024-01-01 RX ADMIN — INSULIN GLARGINE 10 UNIT(S): 100 INJECTION, SOLUTION SUBCUTANEOUS at 09:44

## 2024-01-01 RX ADMIN — Medication 2 UNIT(S): at 12:03

## 2024-01-01 RX ADMIN — Medication 40 MILLIGRAM(S): at 10:53

## 2024-01-01 RX ADMIN — Medication 3 MILLIGRAM(S): at 22:21

## 2024-01-01 RX ADMIN — INSULIN GLARGINE 5 UNIT(S): 100 INJECTION, SOLUTION SUBCUTANEOUS at 08:11

## 2024-01-01 RX ADMIN — Medication 1 LOZENGE: at 17:56

## 2024-01-01 RX ADMIN — Medication 100 MILLIGRAM(S): at 22:35

## 2024-01-01 RX ADMIN — Medication 300 MILLIGRAM(S): at 10:51

## 2024-01-01 RX ADMIN — SODIUM CHLORIDE 2200 MILLILITER(S): 9 INJECTION INTRAMUSCULAR; INTRAVENOUS; SUBCUTANEOUS at 10:10

## 2024-01-01 RX ADMIN — Medication 1 LOZENGE: at 22:53

## 2024-01-01 RX ADMIN — Medication 2 UNIT(S): at 12:19

## 2024-01-01 RX ADMIN — Medication 40 MILLIGRAM(S): at 17:52

## 2024-01-01 RX ADMIN — BUDESONIDE AND FORMOTEROL FUMARATE DIHYDRATE 2 PUFF(S): 160; 4.5 AEROSOL RESPIRATORY (INHALATION) at 20:05

## 2024-01-01 RX ADMIN — ALBUTEROL 2.5 MILLIGRAM(S): 90 AEROSOL, METERED ORAL at 14:11

## 2024-01-01 RX ADMIN — ENOXAPARIN SODIUM 40 MILLIGRAM(S): 100 INJECTION SUBCUTANEOUS at 22:36

## 2024-01-01 RX ADMIN — TIOTROPIUM BROMIDE 2 PUFF(S): 18 CAPSULE ORAL; RESPIRATORY (INHALATION) at 08:03

## 2024-01-01 RX ADMIN — ALBUTEROL 2.5 MILLIGRAM(S): 90 AEROSOL, METERED ORAL at 20:10

## 2024-01-01 RX ADMIN — Medication 20 MILLIGRAM(S): at 12:46

## 2024-01-01 RX ADMIN — ENOXAPARIN SODIUM 40 MILLIGRAM(S): 100 INJECTION SUBCUTANEOUS at 21:55

## 2024-01-01 RX ADMIN — BUDESONIDE AND FORMOTEROL FUMARATE DIHYDRATE 2 PUFF(S): 160; 4.5 AEROSOL RESPIRATORY (INHALATION) at 09:25

## 2024-01-01 RX ADMIN — ALBUTEROL 2.5 MILLIGRAM(S): 90 AEROSOL, METERED ORAL at 20:31

## 2024-01-01 RX ADMIN — Medication 1 LOZENGE: at 22:21

## 2024-01-01 RX ADMIN — BUDESONIDE AND FORMOTEROL FUMARATE DIHYDRATE 2 PUFF(S): 160; 4.5 AEROSOL RESPIRATORY (INHALATION) at 08:04

## 2024-01-01 RX ADMIN — Medication 3 UNIT(S): at 17:18

## 2024-01-01 RX ADMIN — Medication 1 LOZENGE: at 14:34

## 2024-01-01 RX ADMIN — Medication 2: at 13:12

## 2024-01-01 RX ADMIN — Medication 40 MILLIGRAM(S): at 10:50

## 2024-01-01 RX ADMIN — Medication 25 MILLIGRAM(S): at 11:09

## 2024-01-01 RX ADMIN — LISINOPRIL 10 MILLIGRAM(S): 2.5 TABLET ORAL at 09:52

## 2024-01-01 RX ADMIN — Medication 400 MILLIGRAM(S): at 10:06

## 2024-01-01 RX ADMIN — Medication 300 MILLIGRAM(S): at 23:18

## 2024-01-01 RX ADMIN — INSULIN GLARGINE 5 UNIT(S): 100 INJECTION, SOLUTION SUBCUTANEOUS at 13:19

## 2024-01-01 RX ADMIN — Medication 25 MILLIGRAM(S): at 10:07

## 2024-01-01 RX ADMIN — BUDESONIDE AND FORMOTEROL FUMARATE DIHYDRATE 2 PUFF(S): 160; 4.5 AEROSOL RESPIRATORY (INHALATION) at 08:15

## 2024-01-01 RX ADMIN — Medication 1: at 17:32

## 2024-01-01 RX ADMIN — Medication 40 MILLIGRAM(S): at 00:51

## 2024-01-01 RX ADMIN — Medication 40 MILLIGRAM(S): at 05:47

## 2024-01-01 RX ADMIN — INSULIN GLARGINE 5 UNIT(S): 100 INJECTION, SOLUTION SUBCUTANEOUS at 08:25

## 2024-01-01 RX ADMIN — SENNA PLUS 2 TABLET(S): 8.6 TABLET ORAL at 22:21

## 2024-01-01 RX ADMIN — Medication 100 MILLIGRAM(S): at 20:27

## 2024-01-01 RX ADMIN — Medication 40 MILLIGRAM(S): at 17:55

## 2024-01-01 RX ADMIN — ALBUTEROL 2.5 MILLIGRAM(S): 90 AEROSOL, METERED ORAL at 14:07

## 2024-01-01 RX ADMIN — ENOXAPARIN SODIUM 40 MILLIGRAM(S): 100 INJECTION SUBCUTANEOUS at 22:12

## 2024-01-01 RX ADMIN — Medication 81 MILLIGRAM(S): at 10:22

## 2024-01-01 RX ADMIN — TIOTROPIUM BROMIDE 2 PUFF(S): 18 CAPSULE ORAL; RESPIRATORY (INHALATION) at 07:44

## 2024-01-01 RX ADMIN — Medication 1 LOZENGE: at 21:34

## 2024-01-01 RX ADMIN — SODIUM CHLORIDE 75 MILLILITER(S): 9 INJECTION INTRAMUSCULAR; INTRAVENOUS; SUBCUTANEOUS at 10:46

## 2024-01-01 RX ADMIN — BUDESONIDE AND FORMOTEROL FUMARATE DIHYDRATE 2 PUFF(S): 160; 4.5 AEROSOL RESPIRATORY (INHALATION) at 20:31

## 2024-01-01 RX ADMIN — Medication 2 UNIT(S): at 08:19

## 2024-01-01 RX ADMIN — Medication 100 MILLIGRAM(S): at 23:01

## 2024-01-01 RX ADMIN — POLYETHYLENE GLYCOL 3350 17 GRAM(S): 17 POWDER, FOR SOLUTION ORAL at 08:52

## 2024-01-01 RX ADMIN — ENOXAPARIN SODIUM 40 MILLIGRAM(S): 100 INJECTION SUBCUTANEOUS at 21:44

## 2024-01-01 RX ADMIN — Medication 81 MILLIGRAM(S): at 10:07

## 2024-01-01 RX ADMIN — ALBUTEROL 2.5 MILLIGRAM(S): 90 AEROSOL, METERED ORAL at 20:19

## 2024-01-01 RX ADMIN — TIOTROPIUM BROMIDE 2 PUFF(S): 18 CAPSULE ORAL; RESPIRATORY (INHALATION) at 07:29

## 2024-01-01 RX ADMIN — Medication 81 MILLIGRAM(S): at 10:54

## 2024-01-01 RX ADMIN — SODIUM CHLORIDE 75 MILLILITER(S): 9 INJECTION INTRAMUSCULAR; INTRAVENOUS; SUBCUTANEOUS at 06:30

## 2024-01-01 RX ADMIN — ALBUTEROL 2.5 MILLIGRAM(S): 90 AEROSOL, METERED ORAL at 20:14

## 2024-01-01 RX ADMIN — Medication 1 LOZENGE: at 10:53

## 2024-01-01 RX ADMIN — INSULIN GLARGINE 10 UNIT(S): 100 INJECTION, SOLUTION SUBCUTANEOUS at 08:18

## 2024-01-01 RX ADMIN — Medication 3 MILLIGRAM(S): at 22:00

## 2024-01-01 RX ADMIN — Medication 25 MILLIGRAM(S): at 10:24

## 2024-01-01 RX ADMIN — ALBUTEROL 2.5 MILLIGRAM(S): 90 AEROSOL, METERED ORAL at 08:26

## 2024-01-01 RX ADMIN — BUDESONIDE AND FORMOTEROL FUMARATE DIHYDRATE 2 PUFF(S): 160; 4.5 AEROSOL RESPIRATORY (INHALATION) at 08:02

## 2024-01-01 RX ADMIN — ALBUTEROL 2.5 MILLIGRAM(S): 90 AEROSOL, METERED ORAL at 01:58

## 2024-01-01 RX ADMIN — TIOTROPIUM BROMIDE 2 PUFF(S): 18 CAPSULE ORAL; RESPIRATORY (INHALATION) at 08:15

## 2024-01-01 RX ADMIN — ALBUTEROL 2.5 MILLIGRAM(S): 90 AEROSOL, METERED ORAL at 08:08

## 2024-01-01 RX ADMIN — CEFEPIME 2000 MILLIGRAM(S): 1 INJECTION, POWDER, FOR SOLUTION INTRAMUSCULAR; INTRAVENOUS at 07:00

## 2024-01-01 RX ADMIN — Medication 1 LOZENGE: at 22:28

## 2024-01-01 RX ADMIN — Medication 2 UNIT(S): at 12:05

## 2024-01-01 RX ADMIN — BUDESONIDE AND FORMOTEROL FUMARATE DIHYDRATE 2 PUFF(S): 160; 4.5 AEROSOL RESPIRATORY (INHALATION) at 10:04

## 2024-01-01 RX ADMIN — TIOTROPIUM BROMIDE 2 PUFF(S): 18 CAPSULE ORAL; RESPIRATORY (INHALATION) at 10:03

## 2024-01-01 RX ADMIN — ALBUTEROL 2.5 MILLIGRAM(S): 90 AEROSOL, METERED ORAL at 09:03

## 2024-01-01 RX ADMIN — Medication 40 MILLIGRAM(S): at 11:36

## 2024-01-01 RX ADMIN — ALBUTEROL 2.5 MILLIGRAM(S): 90 AEROSOL, METERED ORAL at 14:46

## 2024-01-01 RX ADMIN — INSULIN GLARGINE 5 UNIT(S): 100 INJECTION, SOLUTION SUBCUTANEOUS at 08:49

## 2024-01-01 RX ADMIN — Medication 1 LOZENGE: at 21:53

## 2024-01-01 RX ADMIN — ALBUTEROL 2.5 MILLIGRAM(S): 90 AEROSOL, METERED ORAL at 20:35

## 2024-01-01 RX ADMIN — Medication 2 UNIT(S): at 17:00

## 2024-01-01 RX ADMIN — Medication 1 LOZENGE: at 06:05

## 2024-01-01 RX ADMIN — Medication 2 UNIT(S): at 10:10

## 2024-01-01 RX ADMIN — ENOXAPARIN SODIUM 40 MILLIGRAM(S): 100 INJECTION SUBCUTANEOUS at 22:54

## 2024-01-01 RX ADMIN — ALBUTEROL 2.5 MILLIGRAM(S): 90 AEROSOL, METERED ORAL at 14:56

## 2024-01-01 RX ADMIN — Medication 40 MILLIGRAM(S): at 05:35

## 2024-01-01 RX ADMIN — ENOXAPARIN SODIUM 40 MILLIGRAM(S): 100 INJECTION SUBCUTANEOUS at 22:03

## 2024-01-01 RX ADMIN — ENOXAPARIN SODIUM 40 MILLIGRAM(S): 100 INJECTION SUBCUTANEOUS at 22:00

## 2024-01-01 RX ADMIN — Medication 2 UNIT(S): at 13:12

## 2024-01-01 RX ADMIN — Medication 3 UNIT(S): at 17:19

## 2024-01-01 RX ADMIN — CEFEPIME 2000 MILLIGRAM(S): 1 INJECTION, POWDER, FOR SOLUTION INTRAMUSCULAR; INTRAVENOUS at 06:01

## 2024-01-01 RX ADMIN — ALBUTEROL 2.5 MILLIGRAM(S): 90 AEROSOL, METERED ORAL at 20:13

## 2024-01-01 RX ADMIN — SODIUM CHLORIDE 1 GRAM(S): 9 INJECTION INTRAMUSCULAR; INTRAVENOUS; SUBCUTANEOUS at 06:03

## 2024-01-01 RX ADMIN — LISINOPRIL 10 MILLIGRAM(S): 2.5 TABLET ORAL at 10:55

## 2024-01-01 RX ADMIN — Medication 20 MILLIGRAM(S): at 10:37

## 2024-01-01 RX ADMIN — BUDESONIDE AND FORMOTEROL FUMARATE DIHYDRATE 2 PUFF(S): 160; 4.5 AEROSOL RESPIRATORY (INHALATION) at 08:09

## 2024-01-01 RX ADMIN — Medication 1 LOZENGE: at 06:02

## 2024-01-01 RX ADMIN — Medication 3 UNIT(S): at 17:27

## 2024-01-01 RX ADMIN — Medication 3 MILLIGRAM(S): at 19:38

## 2024-01-01 RX ADMIN — Medication 1: at 17:01

## 2024-01-01 RX ADMIN — Medication 100 MILLIGRAM(S): at 10:22

## 2024-01-01 RX ADMIN — Medication 81 MILLIGRAM(S): at 09:21

## 2024-01-01 RX ADMIN — INSULIN GLARGINE 5 UNIT(S): 100 INJECTION, SOLUTION SUBCUTANEOUS at 08:44

## 2024-01-01 RX ADMIN — Medication 1 LOZENGE: at 06:08

## 2024-01-01 RX ADMIN — Medication 2 UNIT(S): at 17:12

## 2024-01-01 RX ADMIN — Medication 25 MILLIGRAM(S): at 09:09

## 2024-01-01 RX ADMIN — TIOTROPIUM BROMIDE 2 PUFF(S): 18 CAPSULE ORAL; RESPIRATORY (INHALATION) at 07:17

## 2024-01-01 RX ADMIN — Medication 81 MILLIGRAM(S): at 09:11

## 2024-01-01 RX ADMIN — Medication 25 MILLIGRAM(S): at 10:36

## 2024-01-01 RX ADMIN — POLYETHYLENE GLYCOL 3350 17 GRAM(S): 17 POWDER, FOR SOLUTION ORAL at 10:22

## 2024-01-01 RX ADMIN — MORPHINE SULFATE 2 MILLIGRAM(S): 50 CAPSULE, EXTENDED RELEASE ORAL at 14:34

## 2024-01-01 RX ADMIN — Medication 1 LOZENGE: at 22:14

## 2024-01-01 RX ADMIN — Medication 3 MILLIGRAM(S): at 20:26

## 2024-01-01 RX ADMIN — Medication 1 LOZENGE: at 22:03

## 2024-01-01 RX ADMIN — Medication 2 UNIT(S): at 08:49

## 2024-01-01 RX ADMIN — Medication 40 MILLIGRAM(S): at 09:07

## 2024-01-01 RX ADMIN — Medication 100 MILLIGRAM(S): at 11:09

## 2024-01-01 RX ADMIN — ATORVASTATIN CALCIUM 40 MILLIGRAM(S): 80 TABLET, FILM COATED ORAL at 22:35

## 2024-01-01 RX ADMIN — ALBUTEROL 2.5 MILLIGRAM(S): 90 AEROSOL, METERED ORAL at 20:34

## 2024-01-01 RX ADMIN — ENOXAPARIN SODIUM 40 MILLIGRAM(S): 100 INJECTION SUBCUTANEOUS at 21:54

## 2024-01-01 RX ADMIN — BUDESONIDE AND FORMOTEROL FUMARATE DIHYDRATE 2 PUFF(S): 160; 4.5 AEROSOL RESPIRATORY (INHALATION) at 08:27

## 2024-01-01 RX ADMIN — Medication 300 MILLIGRAM(S): at 05:36

## 2024-01-01 RX ADMIN — ALBUTEROL 2.5 MILLIGRAM(S): 90 AEROSOL, METERED ORAL at 08:16

## 2024-01-01 RX ADMIN — Medication 1: at 08:18

## 2024-01-01 RX ADMIN — LISINOPRIL 10 MILLIGRAM(S): 2.5 TABLET ORAL at 10:36

## 2024-01-01 RX ADMIN — Medication 2: at 17:09

## 2024-01-01 RX ADMIN — ATORVASTATIN CALCIUM 40 MILLIGRAM(S): 80 TABLET, FILM COATED ORAL at 22:24

## 2024-01-01 RX ADMIN — CEFEPIME 2000 MILLIGRAM(S): 1 INJECTION, POWDER, FOR SOLUTION INTRAMUSCULAR; INTRAVENOUS at 00:51

## 2024-01-01 RX ADMIN — Medication 81 MILLIGRAM(S): at 09:08

## 2024-01-01 RX ADMIN — LISINOPRIL 10 MILLIGRAM(S): 2.5 TABLET ORAL at 10:22

## 2024-01-01 RX ADMIN — Medication 1 LOZENGE: at 13:30

## 2024-01-01 RX ADMIN — BUDESONIDE AND FORMOTEROL FUMARATE DIHYDRATE 2 PUFF(S): 160; 4.5 AEROSOL RESPIRATORY (INHALATION) at 20:49

## 2024-01-01 RX ADMIN — ATORVASTATIN CALCIUM 40 MILLIGRAM(S): 80 TABLET, FILM COATED ORAL at 20:26

## 2024-01-01 RX ADMIN — Medication 1 LOZENGE: at 13:41

## 2024-01-01 RX ADMIN — Medication 100 MILLIGRAM(S): at 09:12

## 2024-01-01 RX ADMIN — Medication 2 UNIT(S): at 12:36

## 2024-01-01 RX ADMIN — BUDESONIDE AND FORMOTEROL FUMARATE DIHYDRATE 2 PUFF(S): 160; 4.5 AEROSOL RESPIRATORY (INHALATION) at 07:29

## 2024-01-01 RX ADMIN — Medication 3 UNIT(S): at 08:04

## 2024-01-01 RX ADMIN — ALBUTEROL 2.5 MILLIGRAM(S): 90 AEROSOL, METERED ORAL at 20:05

## 2024-01-01 RX ADMIN — POLYETHYLENE GLYCOL 3350 17 GRAM(S): 17 POWDER, FOR SOLUTION ORAL at 11:55

## 2024-01-01 RX ADMIN — Medication 2 UNIT(S): at 08:45

## 2024-01-01 RX ADMIN — Medication 100 MILLIGRAM(S): at 09:33

## 2024-01-01 RX ADMIN — Medication 3 UNIT(S): at 11:54

## 2024-01-01 RX ADMIN — Medication 1 TABLET(S): at 10:53

## 2024-01-01 RX ADMIN — Medication 100 MILLIGRAM(S): at 22:03

## 2024-01-01 RX ADMIN — Medication 3 UNIT(S): at 12:16

## 2024-01-01 RX ADMIN — Medication 81 MILLIGRAM(S): at 09:37

## 2024-01-01 RX ADMIN — Medication 40 MILLIGRAM(S): at 17:08

## 2024-01-01 RX ADMIN — ALBUTEROL 2.5 MILLIGRAM(S): 90 AEROSOL, METERED ORAL at 13:18

## 2024-01-01 RX ADMIN — Medication 2 UNIT(S): at 17:11

## 2024-01-01 RX ADMIN — ALBUTEROL 2.5 MILLIGRAM(S): 90 AEROSOL, METERED ORAL at 01:42

## 2024-01-01 RX ADMIN — ENOXAPARIN SODIUM 40 MILLIGRAM(S): 100 INJECTION SUBCUTANEOUS at 22:58

## 2024-01-01 RX ADMIN — Medication 25 MILLIGRAM(S): at 09:12

## 2024-01-01 RX ADMIN — Medication 300 MILLIGRAM(S): at 17:08

## 2024-01-01 RX ADMIN — Medication 1: at 23:09

## 2024-01-01 RX ADMIN — ALBUTEROL 2.5 MILLIGRAM(S): 90 AEROSOL, METERED ORAL at 20:49

## 2024-01-01 RX ADMIN — Medication 1 LOZENGE: at 07:00

## 2024-01-01 RX ADMIN — Medication 3 UNIT(S): at 08:29

## 2024-01-01 RX ADMIN — Medication 40 MILLIGRAM(S): at 17:53

## 2024-01-01 RX ADMIN — BUDESONIDE AND FORMOTEROL FUMARATE DIHYDRATE 2 PUFF(S): 160; 4.5 AEROSOL RESPIRATORY (INHALATION) at 21:00

## 2024-01-01 RX ADMIN — Medication 650 MILLIGRAM(S): at 18:05

## 2024-01-01 RX ADMIN — Medication 25 MILLIGRAM(S): at 11:35

## 2024-01-01 RX ADMIN — Medication 3 UNIT(S): at 12:10

## 2024-01-01 RX ADMIN — Medication 20 MILLIGRAM(S): at 09:52

## 2024-01-01 RX ADMIN — INSULIN GLARGINE 5 UNIT(S): 100 INJECTION, SOLUTION SUBCUTANEOUS at 15:58

## 2024-01-01 RX ADMIN — Medication 2: at 11:54

## 2024-01-01 RX ADMIN — ALBUTEROL 2.5 MILLIGRAM(S): 90 AEROSOL, METERED ORAL at 08:00

## 2024-01-01 RX ADMIN — INSULIN GLARGINE 5 UNIT(S): 100 INJECTION, SOLUTION SUBCUTANEOUS at 08:18

## 2024-01-01 RX ADMIN — Medication 3: at 17:10

## 2024-01-01 RX ADMIN — Medication 2 UNIT(S): at 12:35

## 2024-01-01 RX ADMIN — SENNA PLUS 2 TABLET(S): 8.6 TABLET ORAL at 22:13

## 2024-01-01 RX ADMIN — Medication 1: at 12:23

## 2024-01-01 RX ADMIN — Medication 2: at 12:43

## 2024-01-01 RX ADMIN — Medication 100 MILLIGRAM(S): at 10:51

## 2024-01-01 RX ADMIN — BUDESONIDE AND FORMOTEROL FUMARATE DIHYDRATE 2 PUFF(S): 160; 4.5 AEROSOL RESPIRATORY (INHALATION) at 07:23

## 2024-01-01 RX ADMIN — BUDESONIDE AND FORMOTEROL FUMARATE DIHYDRATE 2 PUFF(S): 160; 4.5 AEROSOL RESPIRATORY (INHALATION) at 21:16

## 2024-01-01 RX ADMIN — Medication 40 MILLIGRAM(S): at 09:59

## 2024-01-01 RX ADMIN — CEFTRIAXONE 1000 MILLIGRAM(S): 500 INJECTION, POWDER, FOR SOLUTION INTRAMUSCULAR; INTRAVENOUS at 10:08

## 2024-01-01 RX ADMIN — Medication 1: at 08:03

## 2024-01-01 RX ADMIN — TIOTROPIUM BROMIDE 2 PUFF(S): 18 CAPSULE ORAL; RESPIRATORY (INHALATION) at 07:24

## 2024-01-01 RX ADMIN — ALBUTEROL 2.5 MILLIGRAM(S): 90 AEROSOL, METERED ORAL at 20:11

## 2024-01-01 RX ADMIN — Medication 3 MILLIGRAM(S): at 22:36

## 2024-01-01 RX ADMIN — Medication 2: at 12:10

## 2024-01-01 RX ADMIN — Medication 1 LOZENGE: at 13:11

## 2024-01-01 RX ADMIN — INSULIN GLARGINE 10 UNIT(S): 100 INJECTION, SOLUTION SUBCUTANEOUS at 08:03

## 2024-01-01 RX ADMIN — ENOXAPARIN SODIUM 40 MILLIGRAM(S): 100 INJECTION SUBCUTANEOUS at 21:23

## 2024-01-01 RX ADMIN — CEFEPIME 2000 MILLIGRAM(S): 1 INJECTION, POWDER, FOR SOLUTION INTRAMUSCULAR; INTRAVENOUS at 20:25

## 2024-01-01 RX ADMIN — BUDESONIDE AND FORMOTEROL FUMARATE DIHYDRATE 2 PUFF(S): 160; 4.5 AEROSOL RESPIRATORY (INHALATION) at 08:35

## 2024-01-01 RX ADMIN — Medication 3: at 12:15

## 2024-01-01 RX ADMIN — Medication 30 MILLIGRAM(S): at 10:21

## 2024-01-01 RX ADMIN — Medication 40 MILLIGRAM(S): at 22:36

## 2024-01-01 RX ADMIN — Medication 1 LOZENGE: at 14:01

## 2024-01-01 RX ADMIN — Medication 4: at 10:09

## 2024-01-01 RX ADMIN — POLYETHYLENE GLYCOL 3350 17 GRAM(S): 17 POWDER, FOR SOLUTION ORAL at 11:10

## 2024-01-01 RX ADMIN — BUDESONIDE AND FORMOTEROL FUMARATE DIHYDRATE 2 PUFF(S): 160; 4.5 AEROSOL RESPIRATORY (INHALATION) at 09:41

## 2024-01-01 RX ADMIN — SODIUM CHLORIDE 50 MILLILITER(S): 5 INJECTION, SOLUTION INTRAVENOUS at 00:34

## 2024-01-01 RX ADMIN — Medication 100 MILLIGRAM(S): at 18:04

## 2024-01-01 RX ADMIN — ENOXAPARIN SODIUM 40 MILLIGRAM(S): 100 INJECTION SUBCUTANEOUS at 19:43

## 2024-01-01 RX ADMIN — Medication 2 UNIT(S): at 11:43

## 2024-01-01 RX ADMIN — SODIUM CHLORIDE 100 MILLILITER(S): 9 INJECTION, SOLUTION INTRAVENOUS at 06:31

## 2024-01-01 RX ADMIN — Medication 2 UNIT(S): at 13:13

## 2024-01-01 RX ADMIN — ALBUTEROL 2.5 MILLIGRAM(S): 90 AEROSOL, METERED ORAL at 10:53

## 2024-01-01 RX ADMIN — ATORVASTATIN CALCIUM 40 MILLIGRAM(S): 80 TABLET, FILM COATED ORAL at 22:01

## 2024-01-01 RX ADMIN — ATORVASTATIN CALCIUM 40 MILLIGRAM(S): 80 TABLET, FILM COATED ORAL at 22:04

## 2024-01-01 RX ADMIN — Medication 10 MILLIEQUIVALENT(S): at 09:53

## 2024-01-01 RX ADMIN — ATORVASTATIN CALCIUM 40 MILLIGRAM(S): 80 TABLET, FILM COATED ORAL at 20:40

## 2024-01-01 RX ADMIN — TIOTROPIUM BROMIDE 2 PUFF(S): 18 CAPSULE ORAL; RESPIRATORY (INHALATION) at 08:27

## 2024-01-01 RX ADMIN — Medication 3 UNIT(S): at 12:44

## 2024-01-01 RX ADMIN — Medication 1 LOZENGE: at 13:53

## 2024-01-01 RX ADMIN — TIOTROPIUM BROMIDE 2 PUFF(S): 18 CAPSULE ORAL; RESPIRATORY (INHALATION) at 11:06

## 2024-01-01 RX ADMIN — Medication 100 MILLIGRAM(S): at 19:38

## 2024-01-01 RX ADMIN — Medication 40 MILLIGRAM(S): at 20:27

## 2024-01-01 RX ADMIN — ENOXAPARIN SODIUM 40 MILLIGRAM(S): 100 INJECTION SUBCUTANEOUS at 22:05

## 2024-01-01 RX ADMIN — LISINOPRIL 10 MILLIGRAM(S): 2.5 TABLET ORAL at 09:22

## 2024-01-01 RX ADMIN — ALBUTEROL 2.5 MILLIGRAM(S): 90 AEROSOL, METERED ORAL at 20:07

## 2024-01-01 RX ADMIN — CEFEPIME 2000 MILLIGRAM(S): 1 INJECTION, POWDER, FOR SOLUTION INTRAMUSCULAR; INTRAVENOUS at 15:28

## 2024-01-01 RX ADMIN — Medication 3 UNIT(S): at 08:16

## 2024-01-01 RX ADMIN — Medication 10 MILLIEQUIVALENT(S): at 12:11

## 2024-01-01 RX ADMIN — ALBUTEROL 2.5 MILLIGRAM(S): 90 AEROSOL, METERED ORAL at 13:50

## 2024-01-01 RX ADMIN — LISINOPRIL 10 MILLIGRAM(S): 2.5 TABLET ORAL at 11:37

## 2024-01-01 RX ADMIN — Medication 1 LOZENGE: at 22:58

## 2024-01-01 RX ADMIN — LISINOPRIL 10 MILLIGRAM(S): 2.5 TABLET ORAL at 10:48

## 2024-01-01 RX ADMIN — ENOXAPARIN SODIUM 40 MILLIGRAM(S): 100 INJECTION SUBCUTANEOUS at 21:57

## 2024-01-01 RX ADMIN — ATORVASTATIN CALCIUM 40 MILLIGRAM(S): 80 TABLET, FILM COATED ORAL at 22:13

## 2024-01-01 RX ADMIN — LISINOPRIL 10 MILLIGRAM(S): 2.5 TABLET ORAL at 08:51

## 2024-01-01 RX ADMIN — Medication 2 UNIT(S): at 08:52

## 2024-01-01 RX ADMIN — Medication 1 LOZENGE: at 20:40

## 2024-01-01 RX ADMIN — Medication 2: at 12:17

## 2024-01-01 RX ADMIN — ALBUTEROL 2.5 MILLIGRAM(S): 90 AEROSOL, METERED ORAL at 02:17

## 2024-01-01 RX ADMIN — SENNA PLUS 2 TABLET(S): 8.6 TABLET ORAL at 22:00

## 2024-01-01 RX ADMIN — Medication 40 MILLIGRAM(S): at 17:07

## 2024-01-01 RX ADMIN — ALBUTEROL 2.5 MILLIGRAM(S): 90 AEROSOL, METERED ORAL at 14:28

## 2024-01-01 RX ADMIN — Medication 2 UNIT(S): at 12:18

## 2024-01-01 RX ADMIN — SODIUM CHLORIDE 1 GRAM(S): 9 INJECTION INTRAMUSCULAR; INTRAVENOUS; SUBCUTANEOUS at 21:57

## 2024-01-01 RX ADMIN — ALBUTEROL 2.5 MILLIGRAM(S): 90 AEROSOL, METERED ORAL at 21:00

## 2024-01-01 RX ADMIN — Medication 40 MILLIGRAM(S): at 06:16

## 2024-01-01 RX ADMIN — ALBUTEROL 2.5 MILLIGRAM(S): 90 AEROSOL, METERED ORAL at 20:30

## 2024-01-01 RX ADMIN — SENNA PLUS 2 TABLET(S): 8.6 TABLET ORAL at 21:56

## 2024-01-01 RX ADMIN — Medication 40 MILLIGRAM(S): at 05:46

## 2024-01-01 RX ADMIN — LISINOPRIL 10 MILLIGRAM(S): 2.5 TABLET ORAL at 09:56

## 2024-01-01 RX ADMIN — BUDESONIDE AND FORMOTEROL FUMARATE DIHYDRATE 2 PUFF(S): 160; 4.5 AEROSOL RESPIRATORY (INHALATION) at 20:34

## 2024-01-01 RX ADMIN — BUDESONIDE AND FORMOTEROL FUMARATE DIHYDRATE 2 PUFF(S): 160; 4.5 AEROSOL RESPIRATORY (INHALATION) at 20:12

## 2024-01-01 RX ADMIN — Medication 4: at 12:36

## 2024-01-01 RX ADMIN — ALBUTEROL 2.5 MILLIGRAM(S): 90 AEROSOL, METERED ORAL at 13:55

## 2024-01-01 RX ADMIN — ALBUTEROL 2.5 MILLIGRAM(S): 90 AEROSOL, METERED ORAL at 01:57

## 2024-01-01 RX ADMIN — BUDESONIDE AND FORMOTEROL FUMARATE DIHYDRATE 2 PUFF(S): 160; 4.5 AEROSOL RESPIRATORY (INHALATION) at 07:43

## 2024-01-01 RX ADMIN — Medication 1: at 08:26

## 2024-01-01 RX ADMIN — ALBUTEROL 2.5 MILLIGRAM(S): 90 AEROSOL, METERED ORAL at 01:46

## 2024-01-01 RX ADMIN — Medication 1 LOZENGE: at 08:19

## 2024-01-01 RX ADMIN — Medication 2: at 12:35

## 2024-01-01 RX ADMIN — ATORVASTATIN CALCIUM 40 MILLIGRAM(S): 80 TABLET, FILM COATED ORAL at 19:38

## 2024-01-01 RX ADMIN — Medication 1 LOZENGE: at 22:36

## 2024-01-01 RX ADMIN — ENOXAPARIN SODIUM 40 MILLIGRAM(S): 100 INJECTION SUBCUTANEOUS at 23:10

## 2024-01-01 RX ADMIN — Medication 25 MILLIGRAM(S): at 09:53

## 2024-01-01 RX ADMIN — ATORVASTATIN CALCIUM 40 MILLIGRAM(S): 80 TABLET, FILM COATED ORAL at 22:03

## 2024-01-01 RX ADMIN — CEFEPIME 2000 MILLIGRAM(S): 1 INJECTION, POWDER, FOR SOLUTION INTRAMUSCULAR; INTRAVENOUS at 14:27

## 2024-01-01 RX ADMIN — Medication 1 LOZENGE: at 05:46

## 2024-01-01 RX ADMIN — Medication 2 UNIT(S): at 08:31

## 2024-01-01 RX ADMIN — SODIUM CHLORIDE 1 GRAM(S): 9 INJECTION INTRAMUSCULAR; INTRAVENOUS; SUBCUTANEOUS at 05:46

## 2024-01-01 RX ADMIN — Medication 81 MILLIGRAM(S): at 10:24

## 2024-01-01 RX ADMIN — Medication 1 LOZENGE: at 13:02

## 2024-01-01 RX ADMIN — Medication 40 MILLIGRAM(S): at 13:01

## 2024-01-01 RX ADMIN — INSULIN GLARGINE 5 UNIT(S): 100 INJECTION, SOLUTION SUBCUTANEOUS at 10:33

## 2024-01-01 RX ADMIN — Medication 1 LOZENGE: at 21:43

## 2024-01-01 RX ADMIN — ALBUTEROL 2.5 MILLIGRAM(S): 90 AEROSOL, METERED ORAL at 10:03

## 2024-01-01 RX ADMIN — ALBUTEROL 2.5 MILLIGRAM(S): 90 AEROSOL, METERED ORAL at 08:02

## 2024-01-01 RX ADMIN — BUDESONIDE AND FORMOTEROL FUMARATE DIHYDRATE 2 PUFF(S): 160; 4.5 AEROSOL RESPIRATORY (INHALATION) at 21:09

## 2024-01-01 RX ADMIN — Medication 1: at 12:03

## 2024-01-01 RX ADMIN — CEFEPIME 2000 MILLIGRAM(S): 1 INJECTION, POWDER, FOR SOLUTION INTRAMUSCULAR; INTRAVENOUS at 17:44

## 2024-01-01 RX ADMIN — ALBUTEROL 2.5 MILLIGRAM(S): 90 AEROSOL, METERED ORAL at 07:23

## 2024-01-01 RX ADMIN — Medication 81 MILLIGRAM(S): at 11:09

## 2024-01-01 RX ADMIN — SODIUM CHLORIDE 1 GRAM(S): 9 INJECTION INTRAMUSCULAR; INTRAVENOUS; SUBCUTANEOUS at 14:22

## 2024-01-01 RX ADMIN — INSULIN GLARGINE 5 UNIT(S): 100 INJECTION, SOLUTION SUBCUTANEOUS at 08:30

## 2024-01-01 RX ADMIN — Medication 3 MILLIGRAM(S): at 21:24

## 2024-01-01 RX ADMIN — Medication 3 UNIT(S): at 08:40

## 2024-01-01 RX ADMIN — Medication 25 MILLIGRAM(S): at 10:48

## 2024-01-01 RX ADMIN — ALBUTEROL 2.5 MILLIGRAM(S): 90 AEROSOL, METERED ORAL at 14:15

## 2024-01-01 RX ADMIN — INSULIN GLARGINE 5 UNIT(S): 100 INJECTION, SOLUTION SUBCUTANEOUS at 09:10

## 2024-01-01 RX ADMIN — Medication 100 MILLIGRAM(S): at 20:40

## 2024-02-16 NOTE — REVIEW OF SYSTEMS
[Cough: Grade 1 - Mild symptoms; nonprescription intervention indicated] : Cough: Grade 1 - Mild symptoms; nonprescription intervention indicated [Dyspnea: Grade 1 - Shortness of breath with moderate exertion] : Dyspnea: Grade 1 - Shortness of breath with moderate exertion

## 2024-02-16 NOTE — HISTORY OF PRESENT ILLNESS
[FreeTextEntry1] : This is a 78 year old male diagnosed with non small cell lung cancer in September 2023 referred by Dr. Guan.   He has a 30 pack year smoking history. Stopped a few years ago.   He presented with a new dry cough.  Chest xray performed on 8/21/23 showed a new left lower lobe mass like opacity.   Chest CT performed on 8/29/23 showed a mass like opacity in the left lower lobe. Worrisome for primary pulmonary malignancy. Surrounding patchy and groundglass opacities. Multiple enlarged heterogenous lymph nodes in the mediastinum and left hilum are worrisome for metastatic disease.   PET scan performed on 9/5/23 showed a hypermetabolic left lung tumor (measuring 6.2 x 4.7 cm) and multiple left hilar, bilateral mediastinal and left supraclavicular/ lower cervical kevan metastases. Additional few sub centimeter bilateral lung nodules.   VATS procedure performed on 9/19/23 by  . 2 level 6 lymph nodes with near complete kevan replacement by metastatic poorly differentiated non small cell carcinoma with necrosis and fibrosis. Extra kevan extension seen.  2 level 5 lymph nodes positive for metastatic poorly differentiated non small cell carcinoma with necrosis. Skeletal muscle negative.  Left neck lymph node core biopsy showed fibroadipose tissue with metastatic poorly differentiated non small cell carcinoma with associated desmoplastic stromal reaction.   MRI brain performed on 10/10/23 was negative for metastases.   Coughing noted, taking Tessalon Pearls. Denies SOB. c/o weight loss of 20-25 lbs over past one year.    He was assessed to have stage IIIC, T3N3M0, NSCLC of left lung LLL with left hilar/mediastinal/supraclavicular adenopathy. Recommend patient proceeds with systemic therapy first, a combination of chemotx and immunotx, to control his high risk for disease dissemination. Can reassess with PET imaging thereafter and then consider consolidation with thoracic radiotherapy at that point.  Completed 4 cycles chemotherapy with immunotherapy (carboplatinum, paclitaxel, and pembrolizumab) with Dr. Guan.  PET scan performed on 2/5/24 showed partial treatment response of previously seen FDG avid left lung tumor. Mixed metabolic and partial anatomic response within metastatic supraclavicular and mediastinal lymphadenopathy. New FDG avid left fissural thickening.   Coughing noted. SOB on exertion noted. Currently on Keytruda. He presents to discuss the role of radiation therapy in his care.

## 2024-02-16 NOTE — VITALS
11/06/20191-day Acuvue MoistOS-[id number]&nbsp; &nbsp; &nbsp; jgb [Maximal Pain Intensity: 0/10] : 0/10 [90: Able to carry normal activity; minor signs or symptoms of disease.] : 90: Able to carry normal activity; minor signs or symptoms of disease.

## 2024-03-14 NOTE — HISTORY OF PRESENT ILLNESS
[FreeTextEntry1] : This is a 79 year old male diagnosed with non small cell lung cancer in September 2023 referred by Dr. Guan.   He has a 30 pack year smoking history. Stopped a few years ago.   He presented with a new dry cough.  Chest xray performed on 8/21/23 showed a new left lower lobe mass like opacity.   Chest CT performed on 8/29/23 showed a mass like opacity in the left lower lobe. Worrisome for primary pulmonary malignancy. Surrounding patchy and groundglass opacities. Multiple enlarged heterogenous lymph nodes in the mediastinum and left hilum are worrisome for metastatic disease.   PET scan performed on 9/5/23 showed a hypermetabolic left lung tumor (measuring 6.2 x 4.7 cm) and multiple left hilar, bilateral mediastinal and left supraclavicular/ lower cervical kevan metastases. Additional few sub centimeter bilateral lung nodules.   VATS procedure performed on 9/19/23 by  . 2 level 6 lymph nodes with near complete kevan replacement by metastatic poorly differentiated non small cell carcinoma with necrosis and fibrosis. Extra kevan extension seen.  2 level 5 lymph nodes positive for metastatic poorly differentiated non small cell carcinoma with necrosis. Skeletal muscle negative.  Left neck lymph node core biopsy showed fibroadipose tissue with metastatic poorly differentiated non small cell carcinoma with associated desmoplastic stromal reaction.   MRI brain performed on 10/10/23 was negative for metastases.   Coughing noted, taking Tessalon Pearls. Denies SOB. c/o weight loss of 20-25 lbs over past one year.    He was assessed to have stage IIIC, T3N3M0, NSCLC of left lung LLL with left hilar/mediastinal/supraclavicular adenopathy. Recommend patient proceeds with systemic therapy first, a combination of chemotx and immunotx, to control his high risk for disease dissemination. Can reassess with PET imaging thereafter and then consider consolidation with thoracic radiotherapy at that point.  Completed 4 cycles chemotherapy with immunotherapy (carboplatinum, paclitaxel, and pembrolizumab) with Dr. Guan.  PET scan performed on 2/5/24 showed partial treatment response of previously seen FDG avid left lung tumor. Mixed metabolic and partial anatomic response within metastatic supraclavicular and mediastinal lymphadenopathy. New FDG avid left fissural thickening.   Coughing noted. SOB on exertion noted. Currently on Keytruda.   He is currently undergoing RT to the left lung.   He presents for an OTV 2/30 fx. Feeling well. Some coughing and SOB noted at times. .

## 2024-03-14 NOTE — DISEASE MANAGEMENT
[Clinical] : TNM Stage: c [IIIC] : IIIC [FreeTextEntry4] : NSCLC [TTNM] : 3 [NTNM] : 3 [MTNM] : 0 [de-identified] : 6924 [de-identified] : 400 [de-identified] : Left Lung

## 2024-03-18 NOTE — HISTORY OF PRESENT ILLNESS
[FreeTextEntry1] : This is a 79 year old male diagnosed with non small cell lung cancer in September 2023 referred by Dr. Guan.   He has a 30 pack year smoking history. Stopped a few years ago.   He presented with a new dry cough. Mild- moderate discomfort. Reporting 5 in pain assessment, relieved with Tylenol  Chest xray performed on 8/21/23 showed a new left lower lobe mass like opacity.   Chest CT performed on 8/29/23 showed a mass like opacity in the left lower lobe. Worrisome for primary pulmonary malignancy. Surrounding patchy and groundglass opacities. Multiple enlarged heterogenous lymph nodes in the mediastinum and left hilum are worrisome for metastatic disease.   PET scan performed on 9/5/23 showed a hypermetabolic left lung tumor (measuring 6.2 x 4.7 cm) and multiple left hilar, bilateral mediastinal and left supraclavicular/ lower cervical kevan metastases. Additional few sub centimeter bilateral lung nodules.   VATS procedure performed on 9/19/23 by  . 2 level 6 lymph nodes with near complete kevan replacement by metastatic poorly differentiated non small cell carcinoma with necrosis and fibrosis. Extra kevan extension seen.  2 level 5 lymph nodes positive for metastatic poorly differentiated non small cell carcinoma with necrosis. Skeletal muscle negative.  Left neck lymph node core biopsy showed fibroadipose tissue with metastatic poorly differentiated non small cell carcinoma with associated desmoplastic stromal reaction.   MRI brain performed on 10/10/23 was negative for metastases.   Coughing noted, taking Tessalon Pearls. Denies SOB. c/o weight loss of 20-25 lbs over past one year.    He was assessed to have stage IIIC, T3N3M0, NSCLC of left lung LLL with left hilar/mediastinal/supraclavicular adenopathy. Recommend patient proceeds with systemic therapy first, a combination of chemotx and immunotx, to control his high risk for disease dissemination. Can reassess with PET imaging thereafter and then consider consolidation with thoracic radiotherapy at that point.  Completed 4 cycles chemotherapy with immunotherapy (carboplatinum, paclitaxel, and pembrolizumab) with Dr. Guan.  PET scan performed on 2/5/24 showed partial treatment response of previously seen FDG avid left lung tumor. Mixed metabolic and partial anatomic response within metastatic supraclavicular and mediastinal lymphadenopathy. New FDG avid left fissural thickening.   Coughing noted. SOB on exertion noted. Currently on Keytruda.   He is currently undergoing RT to the left lung.   He presents for an OTV 4/30 fx. Feeling well. Some coughing and SOB noted at times. States he was prescribed an inhaler and a  pill by his pulmonologist.

## 2024-03-18 NOTE — DISEASE MANAGEMENT
[Clinical] : TNM Stage: c [IIIC] : IIIC [FreeTextEntry4] : NSCLC [TTNM] : 3 [MTNM] : 0 [NTNM] : 3 [de-identified] : 783 [de-identified] : 7228 [de-identified] : Left Lung

## 2024-03-18 NOTE — VITALS
[Least Pain Intensity: 1/10] : 1/10 [Pain Description/Quality: ___] : Pain description/quality: [unfilled] [Pain Location: ___] : Pain Location: [unfilled] [Pain Duration: ___] : Pain duration: [unfilled] [OTC] : OTC [90: Able to carry normal activity; minor signs or symptoms of disease.] : 90: Able to carry normal activity; minor signs or symptoms of disease.  [Maximal Pain Intensity: 1/10] : 1/10

## 2024-03-25 NOTE — DISEASE MANAGEMENT
[Clinical] : TNM Stage: c [IIIC] : IIIC [FreeTextEntry4] : NSCLC [TTNM] : 3 [MTNM] : 0 [NTNM] : 3 [de-identified] : 227 [de-identified] : Left Lung [de-identified] : 9646

## 2024-03-25 NOTE — REASON FOR VISIT
[Routine On-Treatment] : a routine on-treatment visit for [Lung Cancer] : lung cancer [Other: _____] : [unfilled]

## 2024-03-25 NOTE — HISTORY OF PRESENT ILLNESS
[FreeTextEntry1] : This is a 79 year old male diagnosed with non small cell lung cancer in September 2023 referred by Dr. Guan.   He has a 30 pack year smoking history. Stopped a few years ago.   He presented with a new dry cough. Mild- moderate discomfort. Reporting 5 in pain assessment, relieved with Tylenol  Chest xray performed on 8/21/23 showed a new left lower lobe mass like opacity.   Chest CT performed on 8/29/23 showed a mass like opacity in the left lower lobe. Worrisome for primary pulmonary malignancy. Surrounding patchy and groundglass opacities. Multiple enlarged heterogenous lymph nodes in the mediastinum and left hilum are worrisome for metastatic disease.   PET scan performed on 9/5/23 showed a hypermetabolic left lung tumor (measuring 6.2 x 4.7 cm) and multiple left hilar, bilateral mediastinal and left supraclavicular/ lower cervical kevan metastases. Additional few sub centimeter bilateral lung nodules.   VATS procedure performed on 9/19/23 by  . 2 level 6 lymph nodes with near complete kevan replacement by metastatic poorly differentiated non small cell carcinoma with necrosis and fibrosis. Extra kevan extension seen.  2 level 5 lymph nodes positive for metastatic poorly differentiated non small cell carcinoma with necrosis. Skeletal muscle negative.  Left neck lymph node core biopsy showed fibroadipose tissue with metastatic poorly differentiated non small cell carcinoma with associated desmoplastic stromal reaction.   MRI brain performed on 10/10/23 was negative for metastases.   Coughing noted, taking Tessalon Pearls. Denies SOB. c/o weight loss of 20-25 lbs over past one year.    He was assessed to have stage IIIC, T3N3M0, NSCLC of left lung LLL with left hilar/mediastinal/supraclavicular adenopathy. Recommend patient proceeds with systemic therapy first, a combination of chemotx and immunotx, to control his high risk for disease dissemination. Can reassess with PET imaging thereafter and then consider consolidation with thoracic radiotherapy at that point.  Completed 4 cycles chemotherapy with immunotherapy (carboplatinum, paclitaxel, and pembrolizumab) with Dr. Guan.  PET scan performed on 2/5/24 showed partial treatment response of previously seen FDG avid left lung tumor. Mixed metabolic and partial anatomic response within metastatic supraclavicular and mediastinal lymphadenopathy. New FDG avid left fissural thickening.   Coughing noted. SOB on exertion noted. Currently on Keytruda.   He is currently undergoing RT to the left lung.   He presents for an OTV 9/30 fx. Feeling well. Some coughing and SOB noted at times. States he was prescribed an inhaler and a  pill by his pulmonologist. Complaining of left sided head and arm pain possibly from the treatment table or the way he slept last night.

## 2024-04-01 NOTE — DISEASE MANAGEMENT
[Clinical] : TNM Stage: c [IIIC] : IIIC [FreeTextEntry4] : NSCLC [TTNM] : 3 [NTNM] : 3 [MTNM] : 0 [de-identified] : 1832 [de-identified] : 5781 [de-identified] : Left Lung

## 2024-04-01 NOTE — HISTORY OF PRESENT ILLNESS
[FreeTextEntry1] : This is a 79 year old male diagnosed with non small cell lung cancer in September 2023 referred by Dr. Guan.   He has a 30 pack year smoking history. Stopped a few years ago.   He presented with a new dry cough. Mild- moderate discomfort. Reporting 5 in pain assessment, relieved with Tylenol  Chest xray performed on 8/21/23 showed a new left lower lobe mass like opacity.   Chest CT performed on 8/29/23 showed a mass like opacity in the left lower lobe. Worrisome for primary pulmonary malignancy. Surrounding patchy and groundglass opacities. Multiple enlarged heterogenous lymph nodes in the mediastinum and left hilum are worrisome for metastatic disease.   PET scan performed on 9/5/23 showed a hypermetabolic left lung tumor (measuring 6.2 x 4.7 cm) and multiple left hilar, bilateral mediastinal and left supraclavicular/ lower cervical kevan metastases. Additional few sub centimeter bilateral lung nodules.   VATS procedure performed on 9/19/23 by  . 2 level 6 lymph nodes with near complete kevan replacement by metastatic poorly differentiated non small cell carcinoma with necrosis and fibrosis. Extra kevan extension seen.  2 level 5 lymph nodes positive for metastatic poorly differentiated non small cell carcinoma with necrosis. Skeletal muscle negative.  Left neck lymph node core biopsy showed fibroadipose tissue with metastatic poorly differentiated non small cell carcinoma with associated desmoplastic stromal reaction.   MRI brain performed on 10/10/23 was negative for metastases.   Coughing noted, taking Tessalon Pearls. Denies SOB. c/o weight loss of 20-25 lbs over past one year.    He was assessed to have stage IIIC, T3N3M0, NSCLC of left lung LLL with left hilar/mediastinal/supraclavicular adenopathy. Recommend patient proceeds with systemic therapy first, a combination of chemotx and immunotx, to control his high risk for disease dissemination. Can reassess with PET imaging thereafter and then consider consolidation with thoracic radiotherapy at that point.  Completed 4 cycles chemotherapy with immunotherapy (carboplatinum, paclitaxel, and pembrolizumab) with Dr. Guan.  PET scan performed on 2/5/24 showed partial treatment response of previously seen FDG avid left lung tumor. Mixed metabolic and partial anatomic response within metastatic supraclavicular and mediastinal lymphadenopathy. New FDG avid left fissural thickening.   Coughing noted. SOB on exertion noted. Currently on Keytruda.   He is currently undergoing RT to the left lung.   He presents for an OTV 14/30 fx. Feeling well. Some coughing and SOB noted at times. States he was prescribed an inhaler and a  pill by his pulmonologist. Complaining of left sided head and arm pain possibly from the treatment table or the way he slept last night, which has improved. Left finger tips numbness noted.

## 2024-04-08 NOTE — HISTORY OF PRESENT ILLNESS
[FreeTextEntry1] : This is a 79 year old male diagnosed with non small cell lung cancer in September 2023 referred by Dr. Guan.   He has a 30 pack year smoking history. Stopped a few years ago.   He presented with a new dry cough. Mild- moderate discomfort. Reporting 5 in pain assessment, relieved with Tylenol  Chest xray performed on 8/21/23 showed a new left lower lobe mass like opacity.   Chest CT performed on 8/29/23 showed a mass like opacity in the left lower lobe. Worrisome for primary pulmonary malignancy. Surrounding patchy and groundglass opacities. Multiple enlarged heterogenous lymph nodes in the mediastinum and left hilum are worrisome for metastatic disease.   PET scan performed on 9/5/23 showed a hypermetabolic left lung tumor (measuring 6.2 x 4.7 cm) and multiple left hilar, bilateral mediastinal and left supraclavicular/ lower cervical kevan metastases. Additional few sub centimeter bilateral lung nodules.   VATS procedure performed on 9/19/23 by  . 2 level 6 lymph nodes with near complete kevan replacement by metastatic poorly differentiated non small cell carcinoma with necrosis and fibrosis. Extra kevan extension seen.  2 level 5 lymph nodes positive for metastatic poorly differentiated non small cell carcinoma with necrosis. Skeletal muscle negative.  Left neck lymph node core biopsy showed fibroadipose tissue with metastatic poorly differentiated non small cell carcinoma with associated desmoplastic stromal reaction.   MRI brain performed on 10/10/23 was negative for metastases.   Coughing noted, taking Tessalon Pearls. Denies SOB. c/o weight loss of 20-25 lbs over past one year.    He was assessed to have stage IIIC, T3N3M0, NSCLC of left lung LLL with left hilar/mediastinal/supraclavicular adenopathy. Recommend patient proceeds with systemic therapy first, a combination of chemotx and immunotx, to control his high risk for disease dissemination. Can reassess with PET imaging thereafter and then consider consolidation with thoracic radiotherapy at that point.  Completed 4 cycles chemotherapy with immunotherapy (carboplatinum, paclitaxel, and pembrolizumab) with Dr. Guan.  PET scan performed on 2/5/24 showed partial treatment response of previously seen FDG avid left lung tumor. Mixed metabolic and partial anatomic response within metastatic supraclavicular and mediastinal lymphadenopathy. New FDG avid left fissural thickening.   Coughing noted. SOB on exertion noted. Currently on Keytruda.   He is currently undergoing RT to the left lung.   He presents for an OTV 19/30 fx. Feeling well. Some coughing and SOB noted at times. States he was prescribed an inhaler and a  pill by his pulmonologist. Complaining of left sided head and arm pain possibly from the treatment table or the way he slept, which has improved. Left finger tips numbness noted.  No new complaints.  No dysphagia.

## 2024-04-08 NOTE — REVIEW OF SYSTEMS
[Dyspnea: Grade 1 - Shortness of breath with moderate exertion] : Dyspnea: Grade 1 - Shortness of breath with moderate exertion

## 2024-04-08 NOTE — DISEASE MANAGEMENT
[Clinical] : TNM Stage: c [IIIC] : IIIC [FreeTextEntry4] : NSCLC [TTNM] : 3 [NTNM] : 3 [MTNM] : 0 [de-identified] : 7815 [de-identified] : 0648 [de-identified] : Left Lung

## 2024-04-15 NOTE — HISTORY OF PRESENT ILLNESS
[FreeTextEntry1] : This is a 79 year old male diagnosed with non small cell lung cancer in September 2023 referred by Dr. Guan.   He has a 30 pack year smoking history. Stopped a few years ago.   He presented with a new dry cough. Mild- moderate discomfort. Reporting 5 in pain assessment, relieved with Tylenol  Chest xray performed on 8/21/23 showed a new left lower lobe mass like opacity.   Chest CT performed on 8/29/23 showed a mass like opacity in the left lower lobe. Worrisome for primary pulmonary malignancy. Surrounding patchy and groundglass opacities. Multiple enlarged heterogenous lymph nodes in the mediastinum and left hilum are worrisome for metastatic disease.   PET scan performed on 9/5/23 showed a hypermetabolic left lung tumor (measuring 6.2 x 4.7 cm) and multiple left hilar, bilateral mediastinal and left supraclavicular/ lower cervical kevan metastases. Additional few sub centimeter bilateral lung nodules.   VATS procedure performed on 9/19/23 by  . 2 level 6 lymph nodes with near complete kevan replacement by metastatic poorly differentiated non small cell carcinoma with necrosis and fibrosis. Extra kevan extension seen.  2 level 5 lymph nodes positive for metastatic poorly differentiated non small cell carcinoma with necrosis. Skeletal muscle negative.  Left neck lymph node core biopsy showed fibroadipose tissue with metastatic poorly differentiated non small cell carcinoma with associated desmoplastic stromal reaction.   MRI brain performed on 10/10/23 was negative for metastases.   Coughing noted, taking Tessalon Pearls. Denies SOB. c/o weight loss of 20-25 lbs over past one year.    He was assessed to have stage IIIC, T3N3M0, NSCLC of left lung LLL with left hilar/mediastinal/supraclavicular adenopathy. Recommend patient proceeds with systemic therapy first, a combination of chemotx and immunotx, to control his high risk for disease dissemination. Can reassess with PET imaging thereafter and then consider consolidation with thoracic radiotherapy at that point.  Completed 4 cycles chemotherapy with immunotherapy (carboplatinum, paclitaxel, and pembrolizumab) with Dr. Guan.  PET scan performed on 2/5/24 showed partial treatment response of previously seen FDG avid left lung tumor. Mixed metabolic and partial anatomic response within metastatic supraclavicular and mediastinal lymphadenopathy. New FDG avid left fissural thickening.   Coughing noted. SOB on exertion noted. Currently on Keytruda.   He is currently undergoing RT to the left lung.   He presents for an OTV 24/30 fx. Feeling well. Some coughing and SOB noted at times. States he was prescribed an inhaler and a  pill by his pulmonologist.  No new complaints.  No dysphagia.

## 2024-04-15 NOTE — DISEASE MANAGEMENT
[Clinical] : TNM Stage: c [IIIC] : IIIC [FreeTextEntry4] : NSCLC [TTNM] : 3 [NTNM] : 3 [MTNM] : 0 [de-identified] : 3990 [de-identified] : 6811 [de-identified] : Left Lung

## 2024-04-22 NOTE — HISTORY OF PRESENT ILLNESS
[FreeTextEntry1] : This is a 79 year old male diagnosed with non small cell lung cancer in September 2023 referred by Dr. Guan.   He has a 30 pack year smoking history. Stopped a few years ago.   He presented with a new dry cough. Mild- moderate discomfort. Reporting 5 in pain assessment, relieved with Tylenol  Chest xray performed on 8/21/23 showed a new left lower lobe mass like opacity.   Chest CT performed on 8/29/23 showed a mass like opacity in the left lower lobe. Worrisome for primary pulmonary malignancy. Surrounding patchy and groundglass opacities. Multiple enlarged heterogenous lymph nodes in the mediastinum and left hilum are worrisome for metastatic disease.   PET scan performed on 9/5/23 showed a hypermetabolic left lung tumor (measuring 6.2 x 4.7 cm) and multiple left hilar, bilateral mediastinal and left supraclavicular/ lower cervical kevan metastases. Additional few sub centimeter bilateral lung nodules.   VATS procedure performed on 9/19/23 by  . 2 level 6 lymph nodes with near complete kevan replacement by metastatic poorly differentiated non small cell carcinoma with necrosis and fibrosis. Extra keavn extension seen.  2 level 5 lymph nodes positive for metastatic poorly differentiated non small cell carcinoma with necrosis. Skeletal muscle negative.  Left neck lymph node core biopsy showed fibroadipose tissue with metastatic poorly differentiated non small cell carcinoma with associated desmoplastic stromal reaction.   MRI brain performed on 10/10/23 was negative for metastases.   Coughing noted, taking Tessalon Pearls. Denies SOB. c/o weight loss of 20-25 lbs over past one year.    He was assessed to have stage IIIC, T3N3M0, NSCLC of left lung LLL with left hilar/mediastinal/supraclavicular adenopathy. Recommend patient proceeds with systemic therapy first, a combination of chemotx and immunotx, to control his high risk for disease dissemination. Can reassess with PET imaging thereafter and then consider consolidation with thoracic radiotherapy at that point.  Completed 4 cycles chemotherapy with immunotherapy (carboplatinum, paclitaxel, and pembrolizumab) with Dr. Guan.  PET scan performed on 2/5/24 showed partial treatment response of previously seen FDG avid left lung tumor. Mixed metabolic and partial anatomic response within metastatic supraclavicular and mediastinal lymphadenopathy. New FDG avid left fissural thickening.   Coughing noted. SOB on exertion noted. Currently on Keytruda.   He is currently undergoing RT to the left lung.   He presents for an OTV 29/30 fx. Feeling well. Some coughing and SOB noted at times. Pain with coughing noted at times. States he was prescribed an inhaler and a  pill by his pulmonologist. No dysphagia.  TO see pulmonologist for f/u later this week.

## 2024-04-22 NOTE — DISEASE MANAGEMENT
[Clinical] : TNM Stage: c [IIIC] : IIIC [FreeTextEntry4] : NSCLC [TTNM] : 3 [NTNM] : 3 [MTNM] : 0 [de-identified] : 2922 [de-identified] : 0808 [de-identified] : Left Lung

## 2024-05-23 NOTE — PATIENT PROFILE ADULT - FALL HARM RISK - TYPE OF ASSESSMENT
AS NEEDED FOR PAIN Admission

## 2024-05-23 NOTE — H&P ADULT - NSHPLABSRESULTS_GEN_ALL_CORE
11.1   9.49  )-----------( 299      ( 23 May 2024 09:41 )             32.2     05-23    134<L>  |  102  |  15  ----------------------------<  134<H>  4.1   |  25  |  0.69    Ca    8.8      23 May 2024 09:41    TPro  7.0  /  Alb  2.6<L>  /  TBili  0.9  /  DBili  x   /  AST  30  /  ALT  31  /  AlkPhos  108  05-23    SARS-CoV-2: NotDete (23 May 2024 09:41)    CAPILLARY BLOOD GLUCOSE    Troponin I, High Sensitivity Result: 123.38:Troponin I, High Sensitivity Result: 149.07Respiratory Viral Panel with COVID-19 by NATALEE (05.23.24 @ 09:41)   Rapid RVP Result: [facility]  SARS-CoV-2: NotDete: This Respiratory Panel uses polymerase chain reaction (PCR) to detect for   adenovirus; coronavirus (HKU1, NL63, 229E, OC43); human metapneumovirus   (hMPV); human enterovirus/rhinovirus (Entero/RV); influenza A; influenza   A/H1; influenza A/H3; influenza A/H1-2009; influenza B; parainfluenza   viruses 1, 2, 3, 4; respiratory syncytial virus; Mycoplasma pneumoniae;   Chlamydophila pneumoniae; and SARS-CoV-2.Pro-Brain Natriuretic Peptide: 657 pg/mL (05.23.24 @ 09:41)       RADIOLOGY:    < from: CT Angio Chest PE Protocol w/ IV Cont (05.23.24 @ 11:34) >      IMPRESSION:    No pulmonary embolism through the level of the segmental pulmonary   arteries. Evaluation of some subsegmental pulmonary arteries is limited.    Left lower lobe nodular opacity likely known lung cancer. Left parahilar   radiation changes.    Nonspecific diffuse groundglass throughout the right lung, differential   for which includes pneumonitis or infection.    < end of copied text >      I personally reviewed labs, imaging, orders and vitals.

## 2024-05-23 NOTE — PATIENT PROFILE ADULT - FALL HARM RISK - RISK INTERVENTIONS

## 2024-05-23 NOTE — H&P ADULT - HISTORY OF PRESENT ILLNESS
79M with PMH of Lung Ca s/p chemoradiation (last dose of both as per patient in April 2024) and HLD presents with SOB/Cough/Fevers and chills for 2-3 days. onset of cough and MCDONALD 2-3 days prior to admission and since then it has been progressively worsening. Started to have chills 1-2 days prior to admission and these symptoms remains persistent and getting worse. Denies syncope, chest pain, nausea, vomiting, abdominal pain/discomfort. Diarrhea reports (1 episode daily) and non-bloody. Cough productive with yellow and white sputum production. Denies having diagnosis of COPD. Former tobacco use quit many years ago.     In the ER Tmax 100.4F, -108, BP 14/83, RR 16-28, SpO2 85% on RA. WBC 9.49, Hgb 11.1, Na 134 otherwise CMP grossly unremarkable. Lactate 1.9, ProBNP 657, Troponin 116.63->149.07.     < from: CT Angio Chest PE Protocol w/ IV Cont (05.23.24 @ 11:34) >  IMPRESSION:    No pulmonary embolism through the level of the segmental pulmonary   arteries. Evaluation of some subsegmental pulmonary arteries is limited.    Left lower lobe nodular opacity likely known lung cancer. Left parahilar   radiation changes.    Nonspecific diffuse groundglass throughout the right lung, differential   for which includes pneumonitis or infection.    < end of copied text >

## 2024-05-23 NOTE — PHARMACOTHERAPY INTERVENTION NOTE - COMMENTS
Medication history complete, reviewed medication with patients family at bedside and confirmed with DrFirst.

## 2024-05-23 NOTE — H&P ADULT - ASSESSMENT
MEDICATIONS  (STANDING):  albuterol    0.083% 2.5 milliGRAM(s) Nebulizer every 6 hours  albuterol    90 MICROgram(s) HFA Inhaler 1 Puff(s) Inhalation every 4 hours  aspirin enteric coated 81 milliGRAM(s) Oral daily  atorvastatin 40 milliGRAM(s) Oral at bedtime  budesonide 160 MICROgram(s)/formoterol 4.5 MICROgram(s) Inhaler 2 Puff(s) Inhalation two times a day  cefepime  Injectable. 2000 milliGRAM(s) IV Push once  cefepime  Injectable. 2000 milliGRAM(s) IV Push every 8 hours  cefepime  Injectable.      clotrimazole Lozenge 1 Lozenge Oral <User Schedule>  doxycycline monohydrate Capsule 100 milliGRAM(s) Oral every 12 hours  enoxaparin Injectable 40 milliGRAM(s) SubCutaneous every 24 hours  lactated ringers. 1000 milliLiter(s) (100 mL/Hr) IV Continuous <Continuous>  lisinopril 10 milliGRAM(s) Oral daily  methylPREDNISolone sodium succinate Injectable 40 milliGRAM(s) IV Push two times a day  metoprolol succinate ER 25 milliGRAM(s) Oral daily    MEDICATIONS  (PRN):  acetaminophen     Tablet .. 650 milliGRAM(s) Oral every 6 hours PRN Temp greater or equal to 38C (100.4F), Mild Pain (1 - 3)  aluminum hydroxide/magnesium hydroxide/simethicone Suspension 30 milliLiter(s) Oral every 4 hours PRN Dyspepsia  benzonatate 100 milliGRAM(s) Oral every 8 hours PRN Cough  guaiFENesin Oral Liquid (Sugar-Free) 100 milliGRAM(s) Oral every 6 hours PRN Cough  melatonin 3 milliGRAM(s) Oral at bedtime PRN Insomnia  ondansetron Injectable 4 milliGRAM(s) IV Push every 8 hours PRN Nausea and/or Vomiting      A/P    Severe Sepsis Secondary to Pneumonia in Immunocompromised Lung Cancer Patient  Acute bronchitis (likely with underlying COPD but denies ever having diagnosis of COPD)  Acute Respiratory Failure with Hypoxia due to above  Associated elevated troponin secondary to supply/demand ischemia due to above  Lung cancer s/p chemoradiation (last dose reported april 2024). Possible component of radiation pneumonitis.   -Troponins elevated in 100s without significant rise .Telemetry x 24 hours. Cardiology consult. TTE  -CXRAY and CT noted  -Empiric anitbiotics  -Lactate WNL  -Follow cultures  -O2 supplementation  -Albuterol/Symbicort  -IV Solumedrol. Down titrate as tolerated  -Hem/Onc consult/recs    HLD  -Continue statin    DVT Prophylaxis: Lovenox subq

## 2024-05-23 NOTE — ED ADULT TRIAGE NOTE - CHIEF COMPLAINT QUOTE
patient brought in by EMS from home c/o difficulty breathing.  patient reports waking up from sleep this morning feeling weak and having difficulty breathing.  recently completed treatment for lung ca, was supposed to have CT today at PCPs office.  daughter at bedside translating.  85% on RA, not on home O2.

## 2024-05-23 NOTE — ED ADULT NURSE REASSESSMENT NOTE - NS ED NURSE REASSESS COMMENT FT1
Received pt, a/Ox4, VSS. Pt Frisian speaking but family at the bedside interpreting. +SOB, titrated O2 down to 4LNC, satting %. Frequent nonproductive cough noted. Cardiac monitoring maintained. RVP negative. OOB 1-assist r/t weakness, using urinal at the bedside. IV fluid and IV ABX given. Pt awaiting dinner tray. Plan of care discussed with family.

## 2024-05-23 NOTE — ED ADULT NURSE NOTE - OBJECTIVE STATEMENT
Pt presents to the ER from home c/o SOB, daughter at bedside. Pt woke up this am, was supposed to have a CT with PCP, but was having difficulty breathing and generalized weakness. Endorses left lower abd pain 3/10. Hx of lung ca, completed chemo April 2024. Denies any other medical complaints.

## 2024-05-23 NOTE — ED ADULT NURSE NOTE - NSFALLDEVICES_ED_ALL_ED
What Type Of Note Output Would You Prefer (Optional)?: Standard Output Hpi Title: Evaluation of Skin Lesions How Severe Are Your Spot(S)?: mild Have Your Spot(S) Been Treated In The Past?: has not been treated None

## 2024-05-23 NOTE — ED PROVIDER NOTE - OBJECTIVE STATEMENT
80 y/o male with a PMHx of HLD, lung cancer currently undergoing treatment presents to the ED c/o SOB. Pt woke up with SOB this morning. No other complaints at this time. 80 y/o male with a PMHx of HLD, lung cancer currently undergoing treatment presents to the ED c/o SOB. Pt states woke up with SOB this morning. spouse states pt felt warm but did not take any temp so unknown if febrile.   denies any sick contacts.   pt denies any HA, cp, n/v/d/abd pain.   tolerating PO.   No other complaints at this time.

## 2024-05-23 NOTE — PATIENT PROFILE ADULT - FALL HARM RISK - HARM RISK INTERVENTIONS

## 2024-05-23 NOTE — H&P ADULT - NSHPPHYSICALEXAM_GEN_ALL_CORE
PHYSICAL EXAM:    T(C): 37.6 (05-23-24 @ 10:30), Max: 38 (05-23-24 @ 09:10)  HR: 79 (05-23-24 @ 15:14) (79 - 108)  BP: 123/80 (05-23-24 @ 15:14) (123/80 - 144/83)  RR: 23 (05-23-24 @ 15:14) (18 - 30)  SpO2: 100% (05-23-24 @ 15:14) (85% - 100%)    General: AAOx3; NAD; weak/frail  Head: AT/NC  ENT: Moist Mucous Membranes; No Injury  Eyes: EOMI; PERRL  Neck: Non-tender; No JVD  CVS: RRR, S1&S2, No murmur, No edema  Respiratory: Decreased basilar breath sounds R>L; mild end expiratory wheezing; Normal Respiratory Effort; respiratory support with NC  Abdomen/GI: Soft, non-tender, non-distended, no guarding, no rebound, normal bowel sounds  : No bladder distention, No Hill  Extremities: No cyanosis, No clubbing, No edema  MSK: No CVA tenderness, Normal ROM, No injury  Neuro: AAOx3, CNII-XII grossly intact, non-focal  Psych: Appropriate, Cooperative,   Skin: Clean, Dry and Intact

## 2024-05-23 NOTE — ED PROVIDER NOTE - CLINICAL SUMMARY MEDICAL DECISION MAKING FREE TEXT BOX
Pt meets sepsis criteria. Will workup and admit. 78 y/o male with a PMHx of HLD, lung cancer currently undergoing treatment presents to the ED c/o SOB. Pt states woke up with SOB this morning. spouse states pt felt warm but did not take any temp so unknown if febrile.   denies any sick contacts.   pt denies any HA, cp, n/v/d/abd pain.   tolerating PO.   No other complaints at this time.   pt with moderate distress.  lungs CTA,   CV sinus tachy.   Pt meets sepsis criteria. Will workup and admit.    1100:  results noted with concern for PNA LLL.   case d/w Tejinder and request CTA chest first to r/o PE before OBIE.

## 2024-05-23 NOTE — ED ADULT NURSE NOTE - NSFALLRISKINTERV_ED_ALL_ED

## 2024-05-24 NOTE — PHYSICAL THERAPY INITIAL EVALUATION ADULT - GENERAL OBSERVATIONS, REHAB EVAL
Pt seen on 3E, NAD, on 5L O2 via NC- baseline at home as per Rn, Dgt present, pt will and cooperative, Portuguese speaking, dgt translating helpful. HM/pulse/ox+ O2 95% to start and during ambulation dropped to 79% on HM (on portable O2 6L), returned to chair and pt recovered on wall O2 5L, Rn assisted. +IV,

## 2024-05-24 NOTE — DIETITIAN INITIAL EVALUATION ADULT - OTHER INFO
79M with PMH of Lung Ca s/p chemoradiation (last dose of both as per patient in April 2024) and HLD presents with SOB/Cough/Fevers and chills for 2-3 days. onset of cough and MCDONALD 2-3 days prior to admission and since then it has been progressively worsening. Started to have chills 1-2 days prior to admission and these symptoms remains persistent and getting worse. Denies syncope, chest pain, nausea, vomiting, abdominal pain/discomfort. Diarrhea reports (1 episode daily) and non-bloody. Cough productive with yellow and white sputum production. Denies having diagnosis of COPD. Former tobacco use quit many years ago.  79M with PMH of Lung Ca s/p chemoradiation (last dose of both as per patient in April 2024) and HLD presents with SOB/Cough/Fevers and chills for 2-3 days. onset of cough and MCDONALD 2-3 days prior to admission and since then it has been progressively worsening. Started to have chills 1-2 days prior to admission and these symptoms remains persistent and getting worse. Denies syncope, chest pain, nausea, vomiting, abdominal pain/discomfort. Diarrhea reports (1 episode daily) and non-bloody. Cough productive with yellow and white sputum production. Denies having diagnosis of COPD. Former tobacco use quit many years ago.     Admit dx:  Pna due to infectious organism  Visited pt while he was in bed.  Used  # [id number]  EMR wt 72 kg  159#, unable to get bedscale weight  Pt reports loss of 20# in past 4 months, loss of appetite  Pt reports food does not taste good, he is s/p chemo, radiation  He also has poor fitting dentures and can't eat well  Suggest maintain regular diet  Add Ensure Plus tid  suggest Confirm Goals of Care regarding nutrition support. Will provide nutrition/ hydration within goals of care.   Pt may need nutrition support to meet ENN.  Consider adding appetite stimulant such as Remeron or Marinol 2/2 chronically poor appetite/ PO intake   Recommendations to follow in Plan/Intervention

## 2024-05-24 NOTE — PHYSICAL THERAPY INITIAL EVALUATION ADULT - DIAGNOSIS, PT EVAL
Severe Sepsis Secondary to Pneumonia in Immunocompromised Lung Cancer Patient, Acute Respiratory Failure with Hypoxia ,

## 2024-05-24 NOTE — PHYSICAL THERAPY INITIAL EVALUATION ADULT - ORIENTATION, REHAB EVAL
time NA except pt did not know his year of birth, but not sure if that was language difficulty./person/place

## 2024-05-24 NOTE — DIETITIAN INITIAL EVALUATION ADULT - PERTINENT MEDS FT
MEDICATIONS  (STANDING):  albuterol    0.083% 2.5 milliGRAM(s) Nebulizer every 6 hours  albuterol    90 MICROgram(s) HFA Inhaler 1 Puff(s) Inhalation every 4 hours  aspirin enteric coated 81 milliGRAM(s) Oral daily  atorvastatin 40 milliGRAM(s) Oral at bedtime  budesonide 160 MICROgram(s)/formoterol 4.5 MICROgram(s) Inhaler 2 Puff(s) Inhalation two times a day  cefepime  Injectable. 2000 milliGRAM(s) IV Push every 8 hours  cefepime  Injectable.      clotrimazole Lozenge 1 Lozenge Oral <User Schedule>  doxycycline monohydrate Capsule 100 milliGRAM(s) Oral every 12 hours  enoxaparin Injectable 40 milliGRAM(s) SubCutaneous every 24 hours  lactated ringers. 1000 milliLiter(s) (100 mL/Hr) IV Continuous <Continuous>  lisinopril 10 milliGRAM(s) Oral daily  methylPREDNISolone sodium succinate Injectable 40 milliGRAM(s) IV Push two times a day  metoprolol succinate ER 25 milliGRAM(s) Oral daily    MEDICATIONS  (PRN):  acetaminophen     Tablet .. 650 milliGRAM(s) Oral every 6 hours PRN Temp greater or equal to 38C (100.4F), Mild Pain (1 - 3)  aluminum hydroxide/magnesium hydroxide/simethicone Suspension 30 milliLiter(s) Oral every 4 hours PRN Dyspepsia  benzonatate 100 milliGRAM(s) Oral every 8 hours PRN Cough  guaiFENesin Oral Liquid (Sugar-Free) 100 milliGRAM(s) Oral every 6 hours PRN Cough  melatonin 3 milliGRAM(s) Oral at bedtime PRN Insomnia  ondansetron Injectable 4 milliGRAM(s) IV Push every 8 hours PRN Nausea and/or Vomiting

## 2024-05-24 NOTE — DIETITIAN INITIAL EVALUATION ADULT - FACTORS AFF FOOD INTAKE

## 2024-05-24 NOTE — DIETITIAN INITIAL EVALUATION ADULT - PERTINENT LABORATORY DATA
05-24    135  |  103  |  12  ----------------------------<  242<H>  4.2   |  24  |  0.69    Ca    8.6      24 May 2024 06:41    TPro  6.3  /  Alb  2.1<L>  /  TBili  0.5  /  DBili  x   /  AST  18  /  ALT  26  /  AlkPhos  97  05-24

## 2024-05-24 NOTE — CONSULT NOTE ADULT - SUBJECTIVE AND OBJECTIVE BOX
Patient is a 79y old  Male who presents with a chief complaint of Cough/SOB/Fevers (24 May 2024 18:32)    ________________________________  AJose ECHEVERRIA is a 79y year old Male with a past medical history of non obs CAD on cath 5/27/21, non small cell lung CA,s/p chemo and xrt (last dose of both as per patient in April 2024) and HLD presents with SOB/Cough/Fevers and chills for 2-3 days. onset of cough and MCDONALD 2-3 days prior to admission and since then it has been progressively worsening. Started to have chills 1-2 days prior to admission and these symptoms remains persistent and getting worse. Denies syncope, chest pain, nausea, vomiting, abdominal pain/discomfort. Diarrhea reports (1 episode daily) and non-bloody. Cough productive with yellow and white sputum production. Denies having diagnosis of COPD. Former tobacco use quit many years ago.     In the ER Tmax 100.4F, -108, BP 14/83, RR 16-28, SpO2 85% on RA. WBC 9.49, Hgb 11.1, Na 134 otherwise CMP grossly unremarkable. Lactate 1.9, ProBNP 657, Troponin 116.63->149.07.     Still SOB  No CP or palps.  EF normal  Trops flat      PREVIOUS CARDIAC WORKUP:    Echocardiogram 5/24/24      1. Left ventricular cavity is normalin size. Left ventricular systolic function is normal with an ejection fraction of 52 % by 3D with an ejection fraction visually estimated at 50 to 55 %.   2. Normal right ventricular cavity size, with normal wall thickness, and normal systolic function.   3. Mild mitral regurgitation.   4. Mild aortic regurgitation.     Cardiac Catheterization 5/27/21  LMCA: Mild diffuse disease.    LAD: Large caliber vessel. Minor irregularities.    LCx: Normal.Small caliber vessel.    RCA: Large caliber vessel. Mild diffuse disease.    ________________________________  Review of systems: A 10 point review of system has been performed, and is negative except for what has been mentioned in the above history of present illness.     PAST MEDICAL & SURGICAL HISTORY:  HLD (hyperlipidemia)      Hyperlipemia      Lung cancer      No significant past surgical history        FAMILY HISTORY:  No pertinent family history in first degree relatives         SOCIAL HISTORY: The patient denies any tobacco abuse, alcohol abuse or illicit drug use.    ALLERGIES:  No Known Allergies    Home Medications:  Albuterol (Eqv-Proventil HFA) 90 mcg/inh inhalation aerosol: 2 puff(s) inhaled every 6 hours as needed for  shortness of breath and/or wheezing (23 May 2024 11:49)  aspirin 81 mg oral delayed release tablet: 1 tab(s) orally once a day (23 May 2024 11:49)  atorvastatin 40 mg oral tablet: 1 tab(s) orally once a day (23 May 2024 11:49)  clotrimazole 10 mg oral lozenge: 1 lozenge orally 3 times a day (23 May 2024 11:49)  lisinopril 10 mg oral tablet: 1 tab(s) orally once a day (23 May 2024 10:53)  metoprolol succinate 25 mg oral tablet, extended release: 1 tab(s) orally once a day (23 May 2024 11:49)  Trelegy Ellipta 200 mcg-62.5 mcg-25 mcg/inh inhalation powder: 1 puff(s) inhaled once a day (23 May 2024 11:49)    MEDICATIONS  (STANDING):  albuterol    0.083% 2.5 milliGRAM(s) Nebulizer every 6 hours  albuterol    90 MICROgram(s) HFA Inhaler 1 Puff(s) Inhalation every 4 hours  aspirin enteric coated 81 milliGRAM(s) Oral daily  atorvastatin 40 milliGRAM(s) Oral at bedtime  budesonide 160 MICROgram(s)/formoterol 4.5 MICROgram(s) Inhaler 2 Puff(s) Inhalation two times a day  cefepime  Injectable. 2000 milliGRAM(s) IV Push every 8 hours  cefepime  Injectable.      clotrimazole Lozenge 1 Lozenge Oral <User Schedule>  dextrose 10% Bolus 125 milliLiter(s) IV Bolus once  dextrose 5%. 1000 milliLiter(s) (50 mL/Hr) IV Continuous <Continuous>  dextrose 5%. 1000 milliLiter(s) (100 mL/Hr) IV Continuous <Continuous>  dextrose 50% Injectable 25 Gram(s) IV Push once  dextrose 50% Injectable 12.5 Gram(s) IV Push once  doxycycline monohydrate Capsule 100 milliGRAM(s) Oral every 12 hours  enoxaparin Injectable 40 milliGRAM(s) SubCutaneous every 24 hours  glucagon  Injectable 1 milliGRAM(s) IntraMuscular once  insulin lispro (ADMELOG) corrective regimen sliding scale   SubCutaneous three times a day before meals  insulin lispro (ADMELOG) corrective regimen sliding scale   SubCutaneous at bedtime  insulin lispro Injectable (ADMELOG) 3 Unit(s) SubCutaneous three times a day before meals  lactated ringers. 1000 milliLiter(s) (100 mL/Hr) IV Continuous <Continuous>  lisinopril 10 milliGRAM(s) Oral daily  methylPREDNISolone sodium succinate Injectable 40 milliGRAM(s) IV Push two times a day  metoprolol succinate ER 25 milliGRAM(s) Oral daily    MEDICATIONS  (PRN):  acetaminophen     Tablet .. 650 milliGRAM(s) Oral every 6 hours PRN Temp greater or equal to 38C (100.4F), Mild Pain (1 - 3)  aluminum hydroxide/magnesium hydroxide/simethicone Suspension 30 milliLiter(s) Oral every 4 hours PRN Dyspepsia  benzonatate 100 milliGRAM(s) Oral every 8 hours PRN Cough  dextrose Oral Gel 15 Gram(s) Oral once PRN Blood Glucose LESS THAN 70 milliGRAM(s)/deciliter  guaiFENesin Oral Liquid (Sugar-Free) 100 milliGRAM(s) Oral every 6 hours PRN Cough  melatonin 3 milliGRAM(s) Oral at bedtime PRN Insomnia  ondansetron Injectable 4 milliGRAM(s) IV Push every 8 hours PRN Nausea and/or Vomiting    Vital Signs Last 24 Hrs  T(C): 36.6 (24 May 2024 20:49), Max: 36.6 (24 May 2024 17:10)  T(F): 97.8 (24 May 2024 20:49), Max: 97.9 (24 May 2024 17:10)  HR: 88 (24 May 2024 20:49) (78 - 101)  BP: 142/65 (24 May 2024 20:49) (136/58 - 142/65)  BP(mean): 89 (24 May 2024 06:09) (89 - 89)  RR: 18 (24 May 2024 20:49) (18 - 24)  SpO2: 95% (24 May 2024 20:49) (94% - 99%)    Parameters below as of 24 May 2024 20:49  Patient On (Oxygen Delivery Method): nasal cannula  O2 Flow (L/min): 5    I&O's Summary    23 May 2024 07:01  -  24 May 2024 07:00  --------------------------------------------------------  IN: 0 mL / OUT: 500 mL / NET: -500 mL      ________________________________  GENERAL APPEARANCE:  No acute distress  HEAD: normocephalic, atraumatic  NECK: supple, no jugular venous distention, no carotid bruit    HEART: Regular rate and rhythm, S1, S2 normal, 1/6 murmur    CHEST:  No anterior chest wall tenderness    LUNGS: course    ABDOMEN: soft, nontender, nondistended, with positive bowel sounds appreciated  EXTREMITIES: no edema.   NEURO: Alert and oriented x3  PSYC:  Normal affect  SKIN:  Dry  ________________________________   TELEMETRY: off tele     ECG:    LABS:                        9.4    6.29  )-----------( 236      ( 24 May 2024 06:41 )             27.7             05-24    135  |  103  |  12  ----------------------------<  242<H>  4.2   |  24  |  0.69    Ca    8.6      24 May 2024 06:41  Phos  3.4     05-24  Mg     1.8     05-24    TPro  6.3  /  Alb  2.1<L>  /  TBili  0.5  /  DBili  x   /  AST  18  /  ALT  26  /  AlkPhos  97  05-24      LIVER FUNCTIONS - ( 24 May 2024 06:41 )  Alb: 2.1 g/dL / Pro: 6.3 gm/dL / ALK PHOS: 97 U/L / ALT: 26 U/L / AST: 18 U/L / GGT: x         PT/INR - ( 23 May 2024 09:41 )   PT: 13.6 sec;   INR: 1.21 ratio         PTT - ( 23 May 2024 09:41 )  PTT:27.0 sec  Urinalysis Basic - ( 24 May 2024 06:41 )    Color: x / Appearance: x / SG: x / pH: x  Gluc: 242 mg/dL / Ketone: x  / Bili: x / Urobili: x   Blood: x / Protein: x / Nitrite: x   Leuk Esterase: x / RBC: x / WBC x   Sq Epi: x / Non Sq Epi: x / Bacteria: x        PT/INR - ( 23 May 2024 09:41 )   PT: 13.6 sec;   INR: 1.21 ratio         PTT - ( 23 May 2024 09:41 )  PTT:27.0 sec  Urinalysis Basic - ( 24 May 2024 06:41 )    Color: x / Appearance: x / SG: x / pH: x  Gluc: 242 mg/dL / Ketone: x  / Bili: x / Urobili: x   Blood: x / Protein: x / Nitrite: x   Leuk Esterase: x / RBC: x / WBC x   Sq Epi: x / Non Sq Epi: x / Bacteria: x             ________________________________    RADIOLOGY & ADDITIONAL STUDIES: No pulmonary embolism through the level of the segmental pulmonary   arteries. Evaluation of some subsegmental pulmonary arteries is limited.    Left lower lobe nodular opacity likely known lung cancer. Left parahilar   radiation changes.    Nonspecific diffuse groundglass throughout the right lung, differential   for which includes pneumonitis or infection.    ________________________________    ASSESSMENT:  Demand ischmemia  Non obs CAD  Non small Cell lung CA III  Pneumonia vs pneumonitis  (? due to XRT)      PLAN:  In summary, this is a 79y Male with a past medical history of non obs CAD, lung CA admitted for SOB.  Trops pos - likely demand ischemia. No CP, EF normal.  Rec med tx with BB, ASA, statin  d/w family and patient.      ____________________________________________  (Dragon Dictation software used). Thank you for allowing me to participate in the care of your patient. Please contact me should any questions arise.    MICHELLE Elder DO, [facility]  Office: [phone]

## 2024-05-24 NOTE — DIETITIAN INITIAL EVALUATION ADULT - ADD RECOMMEND
English Maintain regular diet  MVI w/ minerals daily to ensure 100% RDA met   Record PO intake in EMR after each meal (nursing.)  Add ensure plus high protein TID to optimize PO intake (provides 350 kcal, 20g protein/ shake)   Consider adding thiamine 100 mg daily 2/2 poor PO intake/ malnutrition   Monitor bowel movements, if no BM for >3 days, consider implementing bowel regimen.  Confirm Goals of Care regarding nutrition support. Will provide nutrition/ hydration within goals of care.  Consider adding appetite stimulant such as Remeron or Marinol 2/2 chronically poor appetite/ PO intake    Nutrition support may be necessary, as pt is not eating well due to food having a bad taste to him  Monitor PO intake, tolerance, labs and weight.    Recommendations to follow in Plan/Intervention

## 2024-05-24 NOTE — PROGRESS NOTE ADULT - SUBJECTIVE AND OBJECTIVE BOX
CHIEF COMPLAINT: Cough/SOB/Fevers    SUBJECTIVE/SIGNIFICANT INTERVAL EVENTS/OVERNIGHT EVENTS:    5/24: Fever overnight x 1. Now afebrile and feeling better. Cough and SOB improved. No events on telemetry. DC telemetry. Tolerating antibiotics. Denies chest pain, nausea, vomiting, abdominal pain/discomfort, dysuria.     Review of Systems: 14 Point review of systems reviewed and reported as negative unless otherwise stated above    FROM H&P:  "79M with PMH of Lung Ca s/p chemoradiation (last dose of both as per patient in April 2024) and HLD presents with SOB/Cough/Fevers and chills for 2-3 days. onset of cough and MCDONALD 2-3 days prior to admission and since then it has been progressively worsening. Started to have chills 1-2 days prior to admission and these symptoms remains persistent and getting worse. Denies syncope, chest pain, nausea, vomiting, abdominal pain/discomfort. Diarrhea reports (1 episode daily) and non-bloody. Cough productive with yellow and white sputum production. Denies having diagnosis of COPD. Former tobacco use quit many years ago.     In the ER Tmax 100.4F, -108, BP 14/83, RR 16-28, SpO2 85% on RA. WBC 9.49, Hgb 11.1, Na 134 otherwise CMP grossly unremarkable. Lactate 1.9, ProBNP 657, Troponin 116.63->149.07."    PHYSICAL EXAM:    T(C): 36.3 (05-24-24 @ 08:24), Max: 38.4 (05-23-24 @ 19:03)  HR: 81 (05-24-24 @ 08:24) (79 - 108)  BP: 139/75 (05-24-24 @ 08:24) (123/80 - 143/73)  RR: 18 (05-24-24 @ 08:24) (18 - 30)  SpO2: 99% (05-24-24 @ 08:24) (94% - 100%)  General: AAOx3; NAD: Frail  ENT: Moist Mucous Membranes; No Injury  Neck: Non-tender; No JVD  CVS: RRR, S1&S2, No murmur, No edema  Respiratory: Decreased basilar breath sounds R>L; mild end expiratory wheezing; Interval improvement Normal Respiratory Effort; respiratory support with NC  Abdomen/GI: Soft, non-tender, non-distended, no guarding, no rebound, normal bowel sounds  Extremities: No cyanosis, No clubbing, No edema  Neuro: AAOx3, CNII-XII grossly intact, non-focal  Skin: Clean, Dry and Intact      LABS:                          9.4    6.29  )-----------( 236      ( 24 May 2024 06:41 )             27.7     05-24    135  |  103  |  12  ----------------------------<  242<H>  4.2   |  24  |  0.69    Ca    8.6      24 May 2024 06:41  Phos  3.4     05-24  Mg     1.8     05-24    TPro  6.3  /  Alb  2.1<L>  /  TBili  0.5  /  DBili  x   /  AST  18  /  ALT  26  /  AlkPhos  97  05-24    SARS-CoV-2: [facility] (23 May 2024 09:41)    CAPILLARY BLOOD GLUCOSE    Troponin I, High Sensitivity (05.23.24 @ 16:16)   Troponin I, High Sensitivity Result: 123.38: Troponin I, High Sensitivity (05.23.24 @ 12:47)   Troponin I, High Sensitivity Result: 149.07:Troponin I, High Sensitivity (05.23.24 @ 09:41)   Troponin I, High Sensitivity Result: 116.63: Respiratory Viral Panel with COVID-19 by NATALEE (05.23.24 @ 09:41)   Rapid RVP Result: [facility]  SARS-CoV-2: NotDetecPro-Brain Natriuretic Peptide: 657 pg/mL (05.23.24 @ 09:41) Lactate, Blood: 1.9 mmol/L (05.23.24 @ 09:41)           RADIOLOGY:  < from: CT Angio Chest PE Protocol w/ IV Cont (05.23.24 @ 11:34) >    IMPRESSION:    No pulmonary embolism through the level of the segmental pulmonary   arteries. Evaluation of some subsegmental pulmonary arteries is limited.    Left lower lobe nodular opacity likely known lung cancer. Left parahilar   radiation changes.    Nonspecific diffuse groundglass throughout the right lung, differential   for which includes pneumonitis or infection.    < end of copied text >      EKG:  < from: 12 Lead ECG (05.23.24 @ 09:48) >    Diagnosis Line Normal sinus rhythm  Normal ECG  No previous ECGs available    < end of copied text >      ECHO:  < from: TTE W or WO Ultrasound Enhancing Agent (05.24.24 @ 07:29) >  CONCLUSIONS:      1. Left ventricular cavity is normal in size. Left ventricular systolic function is normal with an ejection fraction of 52 % by 3D with an ejection fraction visually estimated at 50 to 55 %.   2. Normal right ventricular cavity size, with normal wall thickness, and normal systolic function.   3. Mild mitral regurgitation.   4. Mild aortic regurgitation.    < end of copied text >      PROCEDURES:        I personally reviewed labs, imaging, ekg, orders and vitals.    Discussed case with:  [X]RN  [X]CM/GRETTA  [X]Patient  []Family  []Specialist:

## 2024-05-24 NOTE — DIETITIAN NUTRITION RISK NOTIFICATION - ADDITIONAL COMMENTS/DIETITIAN RECOMMENDATIONS
Maintain regular diet  MVI w/ minerals daily to ensure 100% RDA met   Add ensure plus high protein TID to optimize PO intake (provides 350 kcal, 20g protein/ shake)   Record PO intake in EMR after each meal (nursing.)  Consider adding thiamine 100 mg daily 2/2 poor PO intake/ malnutrition   Monitor bowel movements, if no BM for >3 days, consider implementing bowel regimen.  Confirm Goals of Care regarding nutrition support. Will provide nutrition/ hydration within goals of care.  Consider adding appetite stimulant such as Remeron or Marinol 2/2 chronically poor appetite/ PO intake    Nutrition support may be necessary, as pt is not eating well due to food having a bad taste to him  Monitor PO intake, tolerance, labs and weight.    Recommendations to follow in Plan/Intervention

## 2024-05-24 NOTE — PROGRESS NOTE ADULT - ASSESSMENT
MEDICATIONS  (STANDING):  albuterol    0.083% 2.5 milliGRAM(s) Nebulizer every 6 hours  albuterol    90 MICROgram(s) HFA Inhaler 1 Puff(s) Inhalation every 4 hours  aspirin enteric coated 81 milliGRAM(s) Oral daily  atorvastatin 40 milliGRAM(s) Oral at bedtime  budesonide 160 MICROgram(s)/formoterol 4.5 MICROgram(s) Inhaler 2 Puff(s) Inhalation two times a day  cefepime  Injectable. 2000 milliGRAM(s) IV Push every 8 hours  cefepime  Injectable.      clotrimazole Lozenge 1 Lozenge Oral <User Schedule>  dextrose 10% Bolus 125 milliLiter(s) IV Bolus once  dextrose 5%. 1000 milliLiter(s) (50 mL/Hr) IV Continuous <Continuous>  dextrose 5%. 1000 milliLiter(s) (100 mL/Hr) IV Continuous <Continuous>  dextrose 50% Injectable 25 Gram(s) IV Push once  dextrose 50% Injectable 12.5 Gram(s) IV Push once  doxycycline monohydrate Capsule 100 milliGRAM(s) Oral every 12 hours  enoxaparin Injectable 40 milliGRAM(s) SubCutaneous every 24 hours  glucagon  Injectable 1 milliGRAM(s) IntraMuscular once  insulin glargine Injectable (LANTUS) 5 Unit(s) SubCutaneous every morning  insulin lispro (ADMELOG) corrective regimen sliding scale   SubCutaneous three times a day before meals  insulin lispro (ADMELOG) corrective regimen sliding scale   SubCutaneous at bedtime  lactated ringers. 1000 milliLiter(s) (100 mL/Hr) IV Continuous <Continuous>  lisinopril 10 milliGRAM(s) Oral daily  methylPREDNISolone sodium succinate Injectable 40 milliGRAM(s) IV Push two times a day  metoprolol succinate ER 25 milliGRAM(s) Oral daily    MEDICATIONS  (PRN):  acetaminophen     Tablet .. 650 milliGRAM(s) Oral every 6 hours PRN Temp greater or equal to 38C (100.4F), Mild Pain (1 - 3)  aluminum hydroxide/magnesium hydroxide/simethicone Suspension 30 milliLiter(s) Oral every 4 hours PRN Dyspepsia  benzonatate 100 milliGRAM(s) Oral every 8 hours PRN Cough  dextrose Oral Gel 15 Gram(s) Oral once PRN Blood Glucose LESS THAN 70 milliGRAM(s)/deciliter  guaiFENesin Oral Liquid (Sugar-Free) 100 milliGRAM(s) Oral every 6 hours PRN Cough  melatonin 3 milliGRAM(s) Oral at bedtime PRN Insomnia  ondansetron Injectable 4 milliGRAM(s) IV Push every 8 hours PRN Nausea and/or Vomiting        A/P    Severe Sepsis Secondary to Pneumonia in Immunocompromised Lung Cancer Patient  Acute bronchitis (likely with underlying COPD but denies ever having diagnosis of COPD)  Acute Respiratory Failure with Hypoxia due to above  Associated elevated troponin secondary to supply/demand ischemia due to above  Lung cancer s/p chemoradiation (last dose reported april 2024). Possible component of radiation pneumonitis.   -Troponins elevated in 100s without significant rise .Telemetry x 24 hours. Cardiology consult. TTE  -CXRAY and CT noted  -Empiric anitbiotics  -TTE unremarkable  -EKG NSR without ST changes.   -Lactate WNL  -Follow cultures  -O2 supplementation  -Albuterol/Symbicort  -IV Solumedrol. Down titrate as tolerated  -Hem/Onc consult/recs    HLD  -Continue statin    Hyperglycemia  -Augmented by administration of solumedrol  -A1c ordered  -BGM/ISS/Lantus. Titrate accordingly    DVT Prophylaxis: Lovenox subq

## 2024-05-24 NOTE — PHYSICAL THERAPY INITIAL EVALUATION ADULT - PERTINENT HX OF CURRENT PROBLEM, REHAB EVAL
79M presents with SOB/Cough/Fevers and chills, cough and MCDONALD, progressively worsening, Diarrhea  Cough productive with yellow and white sputum production.  PMH of Lung Ca s/p chemoradiation (last dose of both as per patient in April 2024) and HLD

## 2024-05-24 NOTE — CONSULT NOTE ADULT - SUBJECTIVE AND OBJECTIVE BOX
HPI:  79M with PMH of bulky stage III Lung Ca s/p neoadjuvant chemo/ immune therapy with a good response; this was more recently followed by chemoradiation /last dose of both as per patient in April 2024  Plan was eventual maintenance immune therapy      presents with SOB/Cough/Fevers and chills for 2-3 days. onset of cough and MCDONALD 2-3 days prior to admission and since then it has been progressively worsening. Started to have chills 1-2 days prior to admission and these symptoms remains persistent and getting worse. Denies syncope, chest pain, nausea, vomiting, abdominal pain/discomfort. Diarrhea reports (1 episode daily) and non-bloody. Cough productive with yellow and white sputum production. Denies having diagnosis of COPD. Former tobacco use quit many years ago.     In the ER Tmax 100.4F, -108, BP 14/83, RR 16-28, SpO2 85% on RA. WBC 9.49, Hgb 11.1, Na 134 otherwise CMP grossly unremarkable. Lactate 1.9, ProBNP 657, Troponin 116.63->149.07.     < from: CT Angio Chest PE Protocol w/ IV Cont (05.23.24 @ 11:34) >  IMPRESSION:    No pulmonary embolism through the level of the segmental pulmonary   arteries. Evaluation of some subsegmental pulmonary arteries is limited.    Left lower lobe nodular opacity likely known lung cancer. Left parahilar   radiation changes.    Nonspecific diffuse groundglass throughout the right lung, differential   for which includes pneumonitis or infection.    < end of copied text >       (23 May 2024 16:38)      PAST MEDICAL & SURGICAL HISTORY:  HLD (hyperlipidemia)      Hyperlipemia      Lung cancer      No significant past surgical history          MEDICATIONS  (STANDING):  albuterol    0.083% 2.5 milliGRAM(s) Nebulizer every 6 hours  albuterol    90 MICROgram(s) HFA Inhaler 1 Puff(s) Inhalation every 4 hours  aspirin enteric coated 81 milliGRAM(s) Oral daily  atorvastatin 40 milliGRAM(s) Oral at bedtime  budesonide 160 MICROgram(s)/formoterol 4.5 MICROgram(s) Inhaler 2 Puff(s) Inhalation two times a day  cefepime  Injectable. 2000 milliGRAM(s) IV Push every 8 hours  cefepime  Injectable.      clotrimazole Lozenge 1 Lozenge Oral <User Schedule>  dextrose 10% Bolus 125 milliLiter(s) IV Bolus once  dextrose 5%. 1000 milliLiter(s) (50 mL/Hr) IV Continuous <Continuous>  dextrose 5%. 1000 milliLiter(s) (100 mL/Hr) IV Continuous <Continuous>  dextrose 50% Injectable 25 Gram(s) IV Push once  dextrose 50% Injectable 12.5 Gram(s) IV Push once  doxycycline monohydrate Capsule 100 milliGRAM(s) Oral every 12 hours  enoxaparin Injectable 40 milliGRAM(s) SubCutaneous every 24 hours  glucagon  Injectable 1 milliGRAM(s) IntraMuscular once  insulin lispro (ADMELOG) corrective regimen sliding scale   SubCutaneous three times a day before meals  insulin lispro (ADMELOG) corrective regimen sliding scale   SubCutaneous at bedtime  insulin lispro Injectable (ADMELOG) 3 Unit(s) SubCutaneous three times a day before meals  lactated ringers. 1000 milliLiter(s) (100 mL/Hr) IV Continuous <Continuous>  lisinopril 10 milliGRAM(s) Oral daily  methylPREDNISolone sodium succinate Injectable 40 milliGRAM(s) IV Push two times a day  metoprolol succinate ER 25 milliGRAM(s) Oral daily    MEDICATIONS  (PRN):  acetaminophen     Tablet .. 650 milliGRAM(s) Oral every 6 hours PRN Temp greater or equal to 38C (100.4F), Mild Pain (1 - 3)  aluminum hydroxide/magnesium hydroxide/simethicone Suspension 30 milliLiter(s) Oral every 4 hours PRN Dyspepsia  benzonatate 100 milliGRAM(s) Oral every 8 hours PRN Cough  dextrose Oral Gel 15 Gram(s) Oral once PRN Blood Glucose LESS THAN 70 milliGRAM(s)/deciliter  guaiFENesin Oral Liquid (Sugar-Free) 100 milliGRAM(s) Oral every 6 hours PRN Cough  melatonin 3 milliGRAM(s) Oral at bedtime PRN Insomnia  ondansetron Injectable 4 milliGRAM(s) IV Push every 8 hours PRN Nausea and/or Vomiting      Allergies    No Known Allergies    Intolerances        SOCIAL HISTORY:    FAMILY HISTORY:  No pertinent family history in first degree relatives        Vital Signs Last 24 Hrs  T(C): 36.6 (24 May 2024 17:10), Max: 38.4 (23 May 2024 19:03)  T(F): 97.9 (24 May 2024 17:10), Max: 101.2 (23 May 2024 19:03)  HR: 88 (24 May 2024 17:10) (78 - 108)  BP: 136/58 (24 May 2024 17:10) (126/68 - 140/73)  BP(mean): 89 (24 May 2024 06:09) (89 - 89)  RR: 18 (24 May 2024 17:10) (18 - 24)  SpO2: 95% (24 May 2024 17:10) (94% - 99%)    Parameters below as of 24 May 2024 17:10  Patient On (Oxygen Delivery Method): nasal cannula  O2 Flow (L/min): 5    Looks well today  Less dyspnea    Family by bedside       LABS:                        9.4    6.29  )-----------( 236      ( 24 May 2024 06:41 )             27.7     05-24    135  |  103  |  12  ----------------------------<  242<H>  4.2   |  24  |  0.69    Ca    8.6      24 May 2024 06:41  Phos  3.4     05-24  Mg     1.8     05-24    TPro  6.3  /  Alb  2.1<L>  /  TBili  0.5  /  DBili  x   /  AST  18  /  ALT  26  /  AlkPhos  97  05-24    PT/INR - ( 23 May 2024 09:41 )   PT: 13.6 sec;   INR: 1.21 ratio         PTT - ( 23 May 2024 09:41 )  PTT:27.0 sec  Urinalysis Basic - ( 24 May 2024 06:41 )    Color: x / Appearance: x / SG: x / pH: x  Gluc: 242 mg/dL / Ketone: x  / Bili: x / Urobili: x   Blood: x / Protein: x / Nitrite: x   Leuk Esterase: x / RBC: x / WBC x   Sq Epi: x / Non Sq Epi: x / Bacteria: x        RADIOLOGY & ADDITIONAL STUDIES:  < from: CT Angio Chest PE Protocol w/ IV Cont (05.23.24 @ 11:34) >  ACC: [id number] EXAM:  CT ANGIO CHEST PULM ART [facility]   ORDERED BY: LINDA ARGUETA     PROCEDURE DATE:  05/23/2024          INTERPRETATION:  INDICATION: Shortness of breath. 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 Completed multiple   cycles of chemotherapy, currently on keytruda and is currently undergoing   radiotherapy to the left lung.    TECHNIQUE: Helical acquisition of the chest after the administration of   70 mL of Omnipaque 350. Maximum intensity projection images were   generated.    COMPARISON: None.    FINDINGS:    HEART/VASCULATURE: No pulmonary embolism to the level of the segmental   arteries bilaterally. Evaluation of some subsegmental arteries is limited   due respiratory motion. Enlarged right ventricle relative to the left   ventricle. No pericardial effusion.    LUNGS/AIRWAYS/PLEURA: Central airways are patent. Emphysema. Well   marginated opacities in the central portion of the left upper and lower   lobes. Central left lower lobe 2.6cm nodular opacity (2-66). Diffuse   groundglass in the right upper, middle, and lower lobes. Few small lung   nodules, for example, 6 mm in the left upper lobe, (2:53), and 5 mm in   the right upper lobe (2:70). Left upper lobe calcified granuloma. Trace   left pleural effusion.      LYMPH NODES/MEDIASTINUM: 1.3 cm subcarinal lymph node. Few other   intrathoracic lymph nodes up to 1 cm. Subcentimeter left supraclavicular   lymph node.    UPPER ABDOMEN: 3.0 cm midpole left renal cyst. No acute intra-abdominal   findings of the partially visualized upper abdomen.    BONES/SOFT TISSUES: Degenerative changes. No aggressive osseous lesions.      IMPRESSION:    No pulmonary embolism through the level of the segmental pulmonary   arteries. Evaluation of some subsegmental pulmonary arteries is limited.    Left lower lobe nodular opacity likely known lung cancer. Left parahilar   radiation changes.    Nonspecific diffuse groundglass throughout the right lung, differential   for which includes pneumonitis or infection.    --- End of Report ---          DERIAN AC DO; Resident Radiologist  This document has been electronically signed.  JONATHAN DUMONT M.D., ATTENDING RADIOLOGIST  This document has been electronically s    < end of copied text >

## 2024-05-24 NOTE — CONSULT NOTE ADULT - SUBJECTIVE AND OBJECTIVE BOX
HPI:  79M with PMH Lung Ca s/p chemoradiation (last dose of both as per patient in April 2024) and HLD presents with SOB/Cough/Fevers and chills for 2-3 days. onset of cough and MCDONALD 2-3 days prior to admission and since then it has been progressively worsening. Started to have chills 1-2 days prior to admission and these symptoms remains persistent and getting worse. Denies syncope, chest pain, nausea, vomiting, abdominal pain/discomfort. Diarrhea reports (1 episode daily) and non-bloody. Cough productive with yellow and white sputum production. Denies having diagnosis of COPD. Former tobacco use quit many years ago.     In the ER Tmax 100.4F, -108, BP 14/83, RR 16-28, SpO2 85% on RA. WBC 9.49, Hgb 11.1, Na 134 otherwise CMP grossly unremarkable. Lactate 1.9, ProBNP 657, Troponin 116.63->149.07.     < from: CT Angio Chest PE Protocol w/ IV Cont (05.23.24 @ 11:34) >  IMPRESSION:    No pulmonary embolism through the level of the segmental pulmonary   arteries. Evaluation of some subsegmental pulmonary arteries is limited.    Left lower lobe nodular opacity likely known lung cancer. Left parahilar   radiation changes.    Nonspecific diffuse groundglass throughout the right lung, differential   for which includes pneumonitis or infection.    < end of copied text >       (23 May 2024 16:38)      PAST MEDICAL & SURGICAL HISTORY:  HLD (hyperlipidemia)      Hyperlipemia      Lung cancer      No significant past surgical history          MEDICATIONS  (STANDING):  albuterol    0.083% 2.5 milliGRAM(s) Nebulizer every 6 hours  albuterol    90 MICROgram(s) HFA Inhaler 1 Puff(s) Inhalation every 4 hours  aspirin enteric coated 81 milliGRAM(s) Oral daily  atorvastatin 40 milliGRAM(s) Oral at bedtime  budesonide 160 MICROgram(s)/formoterol 4.5 MICROgram(s) Inhaler 2 Puff(s) Inhalation two times a day  cefepime  Injectable. 2000 milliGRAM(s) IV Push every 8 hours  cefepime  Injectable.      clotrimazole Lozenge 1 Lozenge Oral <User Schedule>  dextrose 10% Bolus 125 milliLiter(s) IV Bolus once  dextrose 5%. 1000 milliLiter(s) (50 mL/Hr) IV Continuous <Continuous>  dextrose 5%. 1000 milliLiter(s) (100 mL/Hr) IV Continuous <Continuous>  dextrose 50% Injectable 25 Gram(s) IV Push once  dextrose 50% Injectable 12.5 Gram(s) IV Push once  doxycycline monohydrate Capsule 100 milliGRAM(s) Oral every 12 hours  enoxaparin Injectable 40 milliGRAM(s) SubCutaneous every 24 hours  glucagon  Injectable 1 milliGRAM(s) IntraMuscular once  insulin glargine Injectable (LANTUS) 5 Unit(s) SubCutaneous every morning  insulin lispro (ADMELOG) corrective regimen sliding scale   SubCutaneous at bedtime  insulin lispro (ADMELOG) corrective regimen sliding scale   SubCutaneous three times a day before meals  lactated ringers. 1000 milliLiter(s) (100 mL/Hr) IV Continuous <Continuous>  lisinopril 10 milliGRAM(s) Oral daily  methylPREDNISolone sodium succinate Injectable 40 milliGRAM(s) IV Push two times a day  metoprolol succinate ER 25 milliGRAM(s) Oral daily    MEDICATIONS  (PRN):  acetaminophen     Tablet .. 650 milliGRAM(s) Oral every 6 hours PRN Temp greater or equal to 38C (100.4F), Mild Pain (1 - 3)  aluminum hydroxide/magnesium hydroxide/simethicone Suspension 30 milliLiter(s) Oral every 4 hours PRN Dyspepsia  benzonatate 100 milliGRAM(s) Oral every 8 hours PRN Cough  dextrose Oral Gel 15 Gram(s) Oral once PRN Blood Glucose LESS THAN 70 milliGRAM(s)/deciliter  guaiFENesin Oral Liquid (Sugar-Free) 100 milliGRAM(s) Oral every 6 hours PRN Cough  melatonin 3 milliGRAM(s) Oral at bedtime PRN Insomnia  ondansetron Injectable 4 milliGRAM(s) IV Push every 8 hours PRN Nausea and/or Vomiting      Allergies    No Known Allergies    Intolerances        SOCIAL HISTORY:    FAMILY HISTORY:  No pertinent family history in first degree relatives        Vital Signs Last 24 Hrs  T(C): 36.3 (24 May 2024 08:24), Max: 38.4 (23 May 2024 19:03)  T(F): 97.4 (24 May 2024 08:24), Max: 101.2 (23 May 2024 19:03)  HR: 81 (24 May 2024 08:24) (79 - 108)  BP: 139/75 (24 May 2024 08:24) (123/80 - 143/73)  BP(mean): 89 (24 May 2024 06:09) (89 - 94)  RR: 18 (24 May 2024 08:24) (18 - 24)  SpO2: 99% (24 May 2024 08:24) (94% - 100%)    Parameters below as of 24 May 2024 08:24  Patient On (Oxygen Delivery Method): nasal cannula  O2 Flow (L/min): 5        LABS:                        9.4    6.29  )-----------( 236      ( 24 May 2024 06:41 )             27.7     05-24    135  |  103  |  12  ----------------------------<  242<H>  4.2   |  24  |  0.69    Ca    8.6      24 May 2024 06:41  Phos  3.4     05-24  Mg     1.8     05-24    TPro  6.3  /  Alb  2.1<L>  /  TBili  0.5  /  DBili  x   /  AST  18  /  ALT  26  /  AlkPhos  97  05-24    PT/INR - ( 23 May 2024 09:41 )   PT: 13.6 sec;   INR: 1.21 ratio         PTT - ( 23 May 2024 09:41 )  PTT:27.0 sec  Urinalysis Basic - ( 24 May 2024 06:41 )    Color: x / Appearance: x / SG: x / pH: x  Gluc: 242 mg/dL / Ketone: x  / Bili: x / Urobili: x   Blood: x / Protein: x / Nitrite: x   Leuk Esterase: x / RBC: x / WBC x   Sq Epi: x / Non Sq Epi: x / Bacteria: x        RADIOLOGY & ADDITIONAL STUDIES:

## 2024-05-24 NOTE — DIETITIAN INITIAL EVALUATION ADULT - NAME AND PHONE
Cally Benavides RDN, CDN, [facility]      [phone]   [email or web address]

## 2024-05-24 NOTE — PHYSICAL THERAPY INITIAL EVALUATION ADULT - NSPTDISCHREC_GEN_A_CORE
family wants pt at home, vs. POWER TBD on progress, Pt will need a RW at home due to his dx of PNA/sepsis/lung CA to complete MRADL's at home/Home PT

## 2024-05-24 NOTE — PHYSICAL THERAPY INITIAL EVALUATION ADULT - MODALITIES TREATMENT COMMENTS
Pt OOB in chair, stated his breathing is better in the chair as per his dgt. Pt recovering in chair on 5L via NC, DBE's encouraged, Lunch present after session. +alarm, DGT present, Rn aware and present for end of this session to assist. HM/pusle ox+ O2 94% on 5L at end of session.

## 2024-05-24 NOTE — DIETITIAN INITIAL EVALUATION ADULT - ORAL INTAKE PTA/DIET HISTORY
Pt reports that his wife shops and cooks for them.  Pt lost his appetite approx 4 months ago, he says that everything   tastes bad and he didn't want to eat much.  PO intake estimated < 75% ENN > one month.

## 2024-05-25 NOTE — PROGRESS NOTE ADULT - ASSESSMENT
MEDICATIONS  (STANDING):  albuterol    0.083% 2.5 milliGRAM(s) Nebulizer every 6 hours  albuterol    90 MICROgram(s) HFA Inhaler 1 Puff(s) Inhalation every 4 hours  aspirin enteric coated 81 milliGRAM(s) Oral daily  atorvastatin 40 milliGRAM(s) Oral at bedtime  budesonide 160 MICROgram(s)/formoterol 4.5 MICROgram(s) Inhaler 2 Puff(s) Inhalation two times a day  cefepime  Injectable. 2000 milliGRAM(s) IV Push every 8 hours  cefepime  Injectable.      clotrimazole Lozenge 1 Lozenge Oral <User Schedule>  dextrose 10% Bolus 125 milliLiter(s) IV Bolus once  dextrose 5%. 1000 milliLiter(s) (50 mL/Hr) IV Continuous <Continuous>  dextrose 5%. 1000 milliLiter(s) (100 mL/Hr) IV Continuous <Continuous>  dextrose 50% Injectable 25 Gram(s) IV Push once  dextrose 50% Injectable 12.5 Gram(s) IV Push once  doxycycline monohydrate Capsule 100 milliGRAM(s) Oral every 12 hours  enoxaparin Injectable 40 milliGRAM(s) SubCutaneous every 24 hours  glucagon  Injectable 1 milliGRAM(s) IntraMuscular once  insulin glargine Injectable (LANTUS) 5 Unit(s) SubCutaneous every morning  insulin lispro (ADMELOG) corrective regimen sliding scale   SubCutaneous three times a day before meals  insulin lispro (ADMELOG) corrective regimen sliding scale   SubCutaneous at bedtime  lactated ringers. 1000 milliLiter(s) (100 mL/Hr) IV Continuous <Continuous>  lisinopril 10 milliGRAM(s) Oral daily  methylPREDNISolone sodium succinate Injectable 40 milliGRAM(s) IV Push two times a day  metoprolol succinate ER 25 milliGRAM(s) Oral daily    MEDICATIONS  (PRN):  acetaminophen     Tablet .. 650 milliGRAM(s) Oral every 6 hours PRN Temp greater or equal to 38C (100.4F), Mild Pain (1 - 3)  aluminum hydroxide/magnesium hydroxide/simethicone Suspension 30 milliLiter(s) Oral every 4 hours PRN Dyspepsia  benzonatate 100 milliGRAM(s) Oral every 8 hours PRN Cough  dextrose Oral Gel 15 Gram(s) Oral once PRN Blood Glucose LESS THAN 70 milliGRAM(s)/deciliter  guaiFENesin Oral Liquid (Sugar-Free) 100 milliGRAM(s) Oral every 6 hours PRN Cough  melatonin 3 milliGRAM(s) Oral at bedtime PRN Insomnia  ondansetron Injectable 4 milliGRAM(s) IV Push every 8 hours PRN Nausea and/or Vomiting        A/P    Severe Sepsis Secondary to Pneumonia in Immunocompromised Lung Cancer Patient  Acute bronchitis (likely with underlying COPD but denies ever having diagnosis of COPD)  Acute Respiratory Failure with Hypoxia due to above  Associated elevated troponin secondary to supply/demand ischemia due to above  Lung cancer s/p chemoradiation (last dose reported april 2024). Possible component of radiation pneumonitis.   -Troponins elevated in 100s without significant rise .Telemetry x 24 hours. Cardiology consult. TTE  -CXRAY and CT noted  -Empiric anitbiotics  -TTE unremarkable  -EKG NSR without ST changes.   -Lactate WNL  -Follow cultures  -O2 supplementation  -Albuterol/Symbicort  -IV Solumedrol. Down titrate as tolerated  -Hem/Onc consult/recs    HLD  -Continue statin    Hyperglycemia  -Augmented by administration of solumedrol  -A1c 6.4%  -BGM/ISS/Lantus. Titrate accordingly    DVT Prophylaxis: Lovenox subq

## 2024-05-25 NOTE — CONSULT NOTE ADULT - SUBJECTIVE AND OBJECTIVE BOX
Patient is a 79y old  Male who presents with a chief complaint of Cough/SOB/Fevers (24 May 2024 22:41)      HPI:  79M with PMH of Lung Ca s/p chemoradiation (last dose of both as per patient in April 2024) and HLD presents with SOB/Cough/Fevers and chills for 2-3 days. onset of cough and MCDONALD 2-3 days prior to admission and since then it has been progressively worsening. Started to have chills 1-2 days prior to admission and these symptoms remains persistent and getting worse. Denies syncope, chest pain, nausea, vomiting, abdominal pain/discomfort. Diarrhea reports (1 episode daily) and non-bloody. Cough productive with yellow and white sputum production. Denies having diagnosis of COPD. Former tobacco use quit many years ago.     In the ER Tmax 100.4F, -108, BP 14/83, RR 16-28, SpO2 85% on RA. WBC 9.49, Hgb 11.1, Na 134 otherwise CMP grossly unremarkable. Lactate 1.9, ProBNP 657, Troponin 116.63->149.07.     < from: CT Angio Chest PE Protocol w/ IV Cont (05.23.24 @ 11:34) >  IMPRESSION:    No pulmonary embolism through the level of the segmental pulmonary   arteries. Evaluation of some subsegmental pulmonary arteries is limited.    Left lower lobe nodular opacity likely known lung cancer. Left parahilar   radiation changes.    Nonspecific diffuse groundglass throughout the right lung, differential   for which includes pneumonitis or infection.    < end of copied text >       (23 May 2024 16:38)      PAST MEDICAL & SURGICAL HISTORY:  HLD (hyperlipidemia)      Hyperlipemia      Lung cancer      No significant past surgical history          PREVIOUS DIAGNOSTIC TESTING:      MEDICATIONS  (STANDING):  albuterol    0.083% 2.5 milliGRAM(s) Nebulizer every 6 hours  albuterol    90 MICROgram(s) HFA Inhaler 1 Puff(s) Inhalation every 4 hours  aspirin enteric coated 81 milliGRAM(s) Oral daily  atorvastatin 40 milliGRAM(s) Oral at bedtime  budesonide 160 MICROgram(s)/formoterol 4.5 MICROgram(s) Inhaler 2 Puff(s) Inhalation two times a day  cefepime  Injectable. 2000 milliGRAM(s) IV Push every 8 hours  cefepime  Injectable.      clotrimazole Lozenge 1 Lozenge Oral <User Schedule>  dextrose 10% Bolus 125 milliLiter(s) IV Bolus once  dextrose 5%. 1000 milliLiter(s) (50 mL/Hr) IV Continuous <Continuous>  dextrose 5%. 1000 milliLiter(s) (100 mL/Hr) IV Continuous <Continuous>  dextrose 50% Injectable 25 Gram(s) IV Push once  dextrose 50% Injectable 12.5 Gram(s) IV Push once  doxycycline monohydrate Capsule 100 milliGRAM(s) Oral every 12 hours  enoxaparin Injectable 40 milliGRAM(s) SubCutaneous every 24 hours  glucagon  Injectable 1 milliGRAM(s) IntraMuscular once  insulin glargine Injectable (LANTUS) 10 Unit(s) SubCutaneous every morning  insulin lispro (ADMELOG) corrective regimen sliding scale   SubCutaneous at bedtime  insulin lispro (ADMELOG) corrective regimen sliding scale   SubCutaneous three times a day before meals  insulin lispro Injectable (ADMELOG) 3 Unit(s) SubCutaneous three times a day before meals  lactated ringers. 1000 milliLiter(s) (100 mL/Hr) IV Continuous <Continuous>  lisinopril 10 milliGRAM(s) Oral daily  methylPREDNISolone sodium succinate Injectable 40 milliGRAM(s) IV Push two times a day  metoprolol succinate ER 25 milliGRAM(s) Oral daily    MEDICATIONS  (PRN):  acetaminophen     Tablet .. 650 milliGRAM(s) Oral every 6 hours PRN Temp greater or equal to 38C (100.4F), Mild Pain (1 - 3)  aluminum hydroxide/magnesium hydroxide/simethicone Suspension 30 milliLiter(s) Oral every 4 hours PRN Dyspepsia  benzonatate 100 milliGRAM(s) Oral every 8 hours PRN Cough  dextrose Oral Gel 15 Gram(s) Oral once PRN Blood Glucose LESS THAN 70 milliGRAM(s)/deciliter  guaiFENesin Oral Liquid (Sugar-Free) 100 milliGRAM(s) Oral every 6 hours PRN Cough  melatonin 3 milliGRAM(s) Oral at bedtime PRN Insomnia  ondansetron Injectable 4 milliGRAM(s) IV Push every 8 hours PRN Nausea and/or Vomiting      FAMILY HISTORY:  No pertinent family history in first degree relatives        SOCIAL HISTORY:  ***    REVIEW OF SYSTEM:  Pertinent items are noted in HPI.      Vital Signs Last 24 Hrs  T(C): 36.5 (25 May 2024 07:16), Max: 36.6 (24 May 2024 17:10)  T(F): 97.7 (25 May 2024 07:16), Max: 97.9 (24 May 2024 17:10)  HR: 88 (25 May 2024 07:16) (83 - 88)  BP: 139/72 (25 May 2024 07:16) (136/58 - 142/65)  BP(mean): --  RR: 19 (25 May 2024 07:16) (18 - 19)  SpO2: 93% (25 May 2024 07:16) (93% - 95%)    Parameters below as of 25 May 2024 07:16  Patient On (Oxygen Delivery Method): nasal cannula  O2 Flow (L/min): 5      I&O's Summary    24 May 2024 07:01  -  25 May 2024 07:00  --------------------------------------------------------  IN: 0 mL / OUT: 1500 mL / NET: -1500 mL      PHYSICAL EXAM  General Appearance: cooperative, no acute distress,   HEENT: PERRL, conjunctiva clear, EOM's intact, non injected pharynx, no exudate, TM   normal  Neck: Supple, , no adenopathy, thyroid: not enlarged, no carotid bruit or JVD  Back: Symmetric, no  tenderness,no soft tissue tenderness  Lungs: Clear to auscultation bilateral,no adventitious breath sounds, normal   expiratory phase  Heart: Regular rate and rhythm, S1, S2 normal, no murmur, rub or gallop  Abdomen: Soft, non-tender, bowel sounds active , no hepatosplenomegaly  Extremities: no cyanosis or edema, no joint swelling  Skin: Skin color, texture normal, no rashes   Neurologic: Alert and oriented X3 , cranial nerves intact, sensory and motor normal,    ECG:    LABS:                          9.4    6.29  )-----------( 236      ( 24 May 2024 06:41 )             27.7     05-24    135  |  103  |  12  ----------------------------<  242<H>  4.2   |  24  |  0.69    Ca    8.6      24 May 2024 06:41  Phos  3.4     05-24  Mg     1.8     05-24    TPro  6.3  /  Alb  2.1<L>  /  TBili  0.5  /  DBili  x   /  AST  18  /  ALT  26  /  AlkPhos  97  05-24              Urinalysis Basic - ( 24 May 2024 06:41 )    Color: x / Appearance: x / SG: x / pH: x  Gluc: 242 mg/dL / Ketone: x  / Bili: x / Urobili: x   Blood: x / Protein: x / Nitrite: x   Leuk Esterase: x / RBC: x / WBC x   Sq Epi: x / Non Sq Epi: x / Bacteria: x            RADIOLOGY & ADDITIONAL STUDIES:  < from: CT Angio Chest PE Protocol w/ IV Cont (05.23.24 @ 11:34) >  IMPRESSION:    No pulmonary embolism through the level of the segmental pulmonary   arteries. Evaluation of some subsegmental pulmonary arteries is limited.    Left lower lobe nodular opacity likely known lung cancer. Left parahilar   radiation changes.    Nonspecific diffuse groundglass throughout the right lung, differential   for which includes pneumonitis or infection.    < end of copied text >

## 2024-05-25 NOTE — PROGRESS NOTE ADULT - SUBJECTIVE AND OBJECTIVE BOX
CHIEF COMPLAINT: Cough/SOB/Fevers    SUBJECTIVE/SIGNIFICANT INTERVAL EVENTS/OVERNIGHT EVENTS:    5/24: Fever overnight x 1. Now afebrile and feeling better. Cough and SOB improved. No events on telemetry. DC telemetry. Tolerating antibiotics. Denies chest pain, nausea, vomiting, abdominal pain/discomfort, dysuria.    5/25:  Cough  and SOB improved but feeling weak and tired. Afebrile. Denies fever, chills, chest pain, nausea, vomiting.  Still hypoxic 84% on RA at rest. Continue O2 supplementation and downtitrate as tolerated     Review of Systems: 14 Point review of systems reviewed and reported as negative unless otherwise stated above    FROM H&P:  "79M with PMH of Lung Ca s/p chemoradiation (last dose of both as per patient in April 2024) and HLD presents with SOB/Cough/Fevers and chills for 2-3 days. onset of cough and MCDONALD 2-3 days prior to admission and since then it has been progressively worsening. Started to have chills 1-2 days prior to admission and these symptoms remains persistent and getting worse. Denies syncope, chest pain, nausea, vomiting, abdominal pain/discomfort. Diarrhea reports (1 episode daily) and non-bloody. Cough productive with yellow and white sputum production. Denies having diagnosis of COPD. Former tobacco use quit many years ago.     In the ER Tmax 100.4F, -108, BP 14/83, RR 16-28, SpO2 85% on RA. WBC 9.49, Hgb 11.1, Na 134 otherwise CMP grossly unremarkable. Lactate 1.9, ProBNP 657, Troponin 116.63->149.07."    PHYSICAL EXAM:    T(C): 36.5 (05-25-24 @ 07:16), Max: 36.6 (05-24-24 @ 17:10)  HR: 88 (05-25-24 @ 08:16) (83 - 88)  BP: 139/72 (05-25-24 @ 07:16) (136/58 - 142/65)  RR: 19 (05-25-24 @ 07:16) (18 - 19)  SpO2: 93% (05-25-24 @ 08:16) (93% - 95%)  General: AAOx3; NAD: Frail  ENT: Moist Mucous Membranes; No Injury  Neck: Non-tender; No JVD  CVS: RRR, S1&S2, No murmur, No edema  Respiratory: Decreased basilar breath sounds R>L; mild end expiratory wheezing; Interval improvement Normal Respiratory Effort; respiratory support with NC  Abdomen/GI: Soft, non-tender, non-distended, no guarding, no rebound, normal bowel sounds  Extremities: No cyanosis, No clubbing, No edema  Neuro: AAOx3, CNII-XII grossly intact, non-focal  Skin: Clean, Dry and Intact      LABS:                          9.4    6.29  )-----------( 236      ( 24 May 2024 06:41 )             27.7     05-24    135  |  103  |  12  ----------------------------<  242<H>  4.2   |  24  |  0.69    Ca    8.6      24 May 2024 06:41  Phos  3.4     05-24  Mg     1.8     05-24    TPro  6.3  /  Alb  2.1<L>  /  TBili  0.5  /  DBili  x   /  AST  18  /  ALT  26  /  AlkPhos  97  05-24    SARS-CoV-2: NotDete (23 May 2024 09:41)    CAPILLARY BLOOD GLUCOSE    Troponin I, High Sensitivity (05.23.24 @ 16:16)   Troponin I, High Sensitivity Result: 123.38: Troponin I, High Sensitivity (05.23.24 @ 12:47)   Troponin I, High Sensitivity Result: 149.07:Troponin I, High Sensitivity (05.23.24 @ 09:41)   Troponin I, High Sensitivity Result: 116.63: Respiratory Viral Panel with COVID-19 by NATALEE (05.23.24 @ 09:41)   Rapid RVP Result: [facility]  SARS-CoV-2: NotDetecPro-Brain Natriuretic Peptide: 657 pg/mL (05.23.24 @ 09:41) Lactate, Blood: 1.9 mmol/L (05.23.24 @ 09:41)           RADIOLOGY:  < from: CT Angio Chest PE Protocol w/ IV Cont (05.23.24 @ 11:34) >    IMPRESSION:    No pulmonary embolism through the level of the segmental pulmonary   arteries. Evaluation of some subsegmental pulmonary arteries is limited.    Left lower lobe nodular opacity likely known lung cancer. Left parahilar   radiation changes.    Nonspecific diffuse groundglass throughout the right lung, differential   for which includes pneumonitis or infection.    < end of copied text >          EKG:  < from: 12 Lead ECG (05.23.24 @ 09:48) >    Diagnosis Line Normal sinus rhythm  Normal ECG  No previous ECGs available    < end of copied text >      ECHO:  < from: TTE W or WO Ultrasound Enhancing Agent (05.24.24 @ 07:29) >  CONCLUSIONS:      1. Left ventricular cavity is normal in size. Left ventricular systolic function is normal with an ejection fraction of 52 % by 3D with an ejection fraction visually estimated at 50 to 55 %.   2. Normal right ventricular cavity size, with normal wall thickness, and normal systolic function.   3. Mild mitral regurgitation.   4. Mild aortic regurgitation.    < end of copied text >              I personally reviewed labs, imaging, ekg, orders and vitals.    Discussed case with:  [X]RN  [X]CM/GRETTA  [X]Patient  []Family  []Specialist:

## 2024-05-26 NOTE — PROGRESS NOTE ADULT - SUBJECTIVE AND OBJECTIVE BOX
Patient is a 79y old  Male who presents with a chief complaint of Cough/SOB/Fevers (24 May 2024 22:41)      HPI:  79M with PMH of Lung Ca s/p chemoradiation (last dose of both as per patient in April 2024) and HLD presents with SOB/Cough/Fevers and chills for 2-3 days. onset of cough and MCDONALD 2-3 days prior to admission and since then it has been progressively worsening. Started to have chills 1-2 days prior to admission and these symptoms remains persistent and getting worse. Denies syncope, chest pain, nausea, vomiting, abdominal pain/discomfort. Diarrhea reports (1 episode daily) and non-bloody. Cough productive with yellow and white sputum production. Denies having diagnosis of COPD. Former tobacco use quit many years ago.     In the ER Tmax 100.4F, -108, BP 14/83, RR 16-28, SpO2 85% on RA. WBC 9.49, Hgb 11.1, Na 134 otherwise CMP grossly unremarkable. Lactate 1.9, ProBNP 657, Troponin 116.63->149.07.     < from: CT Angio Chest PE Protocol w/ IV Cont (05.23.24 @ 11:34) >  IMPRESSION:    No pulmonary embolism through the level of the segmental pulmonary   arteries. Evaluation of some subsegmental pulmonary arteries is limited.    Left lower lobe nodular opacity likely known lung cancer. Left parahilar   radiation changes.    Nonspecific diffuse groundglass throughout the right lung, differential   for which includes pneumonitis or infection.    < end of copied text >       (23 May 2024 16:38)      PAST MEDICAL & SURGICAL HISTORY:  HLD (hyperlipidemia)      Hyperlipemia      Lung cancer      No significant past surgical history          PREVIOUS DIAGNOSTIC TESTING:      MEDICATIONS  (STANDING):  albuterol    0.083% 2.5 milliGRAM(s) Nebulizer every 6 hours  albuterol    90 MICROgram(s) HFA Inhaler 1 Puff(s) Inhalation every 4 hours  aspirin enteric coated 81 milliGRAM(s) Oral daily  atorvastatin 40 milliGRAM(s) Oral at bedtime  budesonide 160 MICROgram(s)/formoterol 4.5 MICROgram(s) Inhaler 2 Puff(s) Inhalation two times a day  cefepime  Injectable. 2000 milliGRAM(s) IV Push every 8 hours  cefepime  Injectable.      clotrimazole Lozenge 1 Lozenge Oral <User Schedule>  dextrose 10% Bolus 125 milliLiter(s) IV Bolus once  dextrose 5%. 1000 milliLiter(s) (50 mL/Hr) IV Continuous <Continuous>  dextrose 5%. 1000 milliLiter(s) (100 mL/Hr) IV Continuous <Continuous>  dextrose 50% Injectable 25 Gram(s) IV Push once  dextrose 50% Injectable 12.5 Gram(s) IV Push once  doxycycline monohydrate Capsule 100 milliGRAM(s) Oral every 12 hours  enoxaparin Injectable 40 milliGRAM(s) SubCutaneous every 24 hours  glucagon  Injectable 1 milliGRAM(s) IntraMuscular once  insulin glargine Injectable (LANTUS) 10 Unit(s) SubCutaneous every morning  insulin lispro (ADMELOG) corrective regimen sliding scale   SubCutaneous at bedtime  insulin lispro (ADMELOG) corrective regimen sliding scale   SubCutaneous three times a day before meals  insulin lispro Injectable (ADMELOG) 3 Unit(s) SubCutaneous three times a day before meals  lactated ringers. 1000 milliLiter(s) (100 mL/Hr) IV Continuous <Continuous>  lisinopril 10 milliGRAM(s) Oral daily  methylPREDNISolone sodium succinate Injectable 40 milliGRAM(s) IV Push two times a day  metoprolol succinate ER 25 milliGRAM(s) Oral daily    MEDICATIONS  (PRN):  acetaminophen     Tablet .. 650 milliGRAM(s) Oral every 6 hours PRN Temp greater or equal to 38C (100.4F), Mild Pain (1 - 3)  aluminum hydroxide/magnesium hydroxide/simethicone Suspension 30 milliLiter(s) Oral every 4 hours PRN Dyspepsia  benzonatate 100 milliGRAM(s) Oral every 8 hours PRN Cough  dextrose Oral Gel 15 Gram(s) Oral once PRN Blood Glucose LESS THAN 70 milliGRAM(s)/deciliter  guaiFENesin Oral Liquid (Sugar-Free) 100 milliGRAM(s) Oral every 6 hours PRN Cough  melatonin 3 milliGRAM(s) Oral at bedtime PRN Insomnia  ondansetron Injectable 4 milliGRAM(s) IV Push every 8 hours PRN Nausea and/or Vomiting      FAMILY HISTORY:  No pertinent family history in first degree relatives        SOCIAL HISTORY:  ***    REVIEW OF SYSTEM:  Pertinent items are noted in HPI.      Vital Signs Last 24 Hrs  T(C): 36.7 (25 May 2024 20:36), Max: 36.7 (25 May 2024 20:36)  T(F): 98 (25 May 2024 20:36), Max: 98 (25 May 2024 20:36)  HR: 88 (25 May 2024 20:36) (86 - 88)  BP: 137/65 (25 May 2024 20:36) (137/65 - 137/65)  BP(mean): 83 (25 May 2024 20:36) (83 - 83)  RR: 18 (25 May 2024 20:36) (18 - 18)  SpO2: 98% (25 May 2024 20:36) (93% - 98%)    Parameters below as of 25 May 2024 20:36  Patient On (Oxygen Delivery Method): nasal cannula  O2 Flow (L/min): 5      PHYSICAL EXAM  General Appearance: cooperative, no acute distress,   HEENT: PERRL, conjunctiva clear, EOM's intact, non injected pharynx, no exudate, TM   normal  Neck: Supple, , no adenopathy, thyroid: not enlarged, no carotid bruit or JVD  Back: Symmetric, no  tenderness,no soft tissue tenderness  Lungs: Clear to auscultation bilateral,no adventitious breath sounds, normal   expiratory phase  Heart: Regular rate and rhythm, S1, S2 normal, no murmur, rub or gallop  Abdomen: Soft, non-tender, bowel sounds active , no hepatosplenomegaly  Extremities: no cyanosis or edema, no joint swelling  Skin: Skin color, texture normal, no rashes   Neurologic: Alert and oriented X3 , cranial nerves intact, sensory and motor normal,    ECG:    LABS:                          9.4    6.29  )-----------( 236      ( 24 May 2024 06:41 )             27.7     05-24    135  |  103  |  12  ----------------------------<  242<H>  4.2   |  24  |  0.69    Ca    8.6      24 May 2024 06:41  Phos  3.4     05-24  Mg     1.8     05-24    TPro  6.3  /  Alb  2.1<L>  /  TBili  0.5  /  DBili  x   /  AST  18  /  ALT  26  /  AlkPhos  97  05-24              Urinalysis Basic - ( 24 May 2024 06:41 )    Color: x / Appearance: x / SG: x / pH: x  Gluc: 242 mg/dL / Ketone: x  / Bili: x / Urobili: x   Blood: x / Protein: x / Nitrite: x   Leuk Esterase: x / RBC: x / WBC x   Sq Epi: x / Non Sq Epi: x / Bacteria: x            RADIOLOGY & ADDITIONAL STUDIES:  < from: CT Angio Chest PE Protocol w/ IV Cont (05.23.24 @ 11:34) >  IMPRESSION:    No pulmonary embolism through the level of the segmental pulmonary   arteries. Evaluation of some subsegmental pulmonary arteries is limited.    Left lower lobe nodular opacity likely known lung cancer. Left parahilar   radiation changes.    Nonspecific diffuse groundglass throughout the right lung, differential   for which includes pneumonitis or infection.    < end of copied text >

## 2024-05-26 NOTE — PROGRESS NOTE ADULT - SUBJECTIVE AND OBJECTIVE BOX
CHIEF COMPLAINT: Cough/SOB/Fevers    SUBJECTIVE/SIGNIFICANT INTERVAL EVENTS/OVERNIGHT EVENTS:    5/24: Fever overnight x 1. Now afebrile and feeling better. Cough and SOB improved. No events on telemetry. DC telemetry. Tolerating antibiotics. Denies chest pain, nausea, vomiting, abdominal pain/discomfort, dysuria.    5/25:  Cough  and SOB improved but feeling weak and tired. Afebrile. Denies fever, chills, chest pain, nausea, vomiting.  Still hypoxic 84% on RA at rest. Continue O2 supplementation and downtitrate as tolerated     5/26: feeling better. Cough near resolved. Still with significant hypoxia on exertion. GLuc improved. Continue antibiotics and steroids.     Review of Systems: 14 Point review of systems reviewed and reported as negative unless otherwise stated above    FROM H&P:  "79M with PMH of Lung Ca s/p chemoradiation (last dose of both as per patient in April 2024) and HLD presents with SOB/Cough/Fevers and chills for 2-3 days. onset of cough and MCDONALD 2-3 days prior to admission and since then it has been progressively worsening. Started to have chills 1-2 days prior to admission and these symptoms remains persistent and getting worse. Denies syncope, chest pain, nausea, vomiting, abdominal pain/discomfort. Diarrhea reports (1 episode daily) and non-bloody. Cough productive with yellow and white sputum production. Denies having diagnosis of COPD. Former tobacco use quit many years ago.     In the ER Tmax 100.4F, -108, BP 14/83, RR 16-28, SpO2 85% on RA. WBC 9.49, Hgb 11.1, Na 134 otherwise CMP grossly unremarkable. Lactate 1.9, ProBNP 657, Troponin 116.63->149.07."    PHYSICAL EXAM:    T(C): 36.6 (05-26-24 @ 07:59), Max: 36.7 (05-25-24 @ 20:36)  HR: 93 (05-26-24 @ 07:59) (84 - 93)  BP: 154/81 (05-26-24 @ 07:59) (137/65 - 154/81)  RR: 19 (05-26-24 @ 07:59) (18 - 19)  SpO2: 93% (05-26-24 @ 07:59) (93% - 98%)  General: AAOx3; NAD: Frail  ENT: Moist Mucous Membranes; No Injury  Neck: Non-tender; No JVD  CVS: RRR, S1&S2, No murmur, No edema  Respiratory: Decreased basilar breath sounds R>L; mild end expiratory wheezing; Interval improvement Normal Respiratory Effort; respiratory support with NC  Abdomen/GI: Soft, non-tender, non-distended, no guarding, no rebound, normal bowel sounds  Extremities: No cyanosis, No clubbing, No edema  Neuro: AAOx3, CNII-XII grossly intact, non-focal  Skin: Clean, Dry and Intact      LABS:                          9.4    6.29  )-----------( 236      ( 24 May 2024 06:41 )             27.7     05-24    135  |  103  |  12  ----------------------------<  242<H>  4.2   |  24  |  0.69    Ca    8.6      24 May 2024 06:41  Phos  3.4     05-24  Mg     1.8     05-24    TPro  6.3  /  Alb  2.1<L>  /  TBili  0.5  /  DBili  x   /  AST  18  /  ALT  26  /  AlkPhos  97  05-24    [facility]: [facility] (23 May 2024 09:41)    CAPILLARY BLOOD GLUCOSE    Troponin I, High Sensitivity (05.23.24 @ 16:16)   Troponin I, High Sensitivity Result: 123.38: Troponin I, High Sensitivity (05.23.24 @ 12:47)   Troponin I, High Sensitivity Result: 149.07:Troponin I, High Sensitivity (05.23.24 @ 09:41)   Troponin I, High Sensitivity Result: 116.63: Respiratory Viral Panel with COVID-19 by NATALEE (05.23.24 @ 09:41)   Rapid RVP Result: [facility]  SARS-CoV-2: NotDetecPro-Brain Natriuretic Peptide: 657 pg/mL (05.23.24 @ 09:41) Lactate, Blood: 1.9 mmol/L (05.23.24 @ 09:41)           RADIOLOGY:  < from: CT Angio Chest PE Protocol w/ IV Cont (05.23.24 @ 11:34) >    IMPRESSION:    No pulmonary embolism through the level of the segmental pulmonary   arteries. Evaluation of some subsegmental pulmonary arteries is limited.    Left lower lobe nodular opacity likely known lung cancer. Left parahilar   radiation changes.    Nonspecific diffuse groundglass throughout the right lung, differential   for which includes pneumonitis or infection.    < end of copied text >          EKG:  < from: 12 Lead ECG (05.23.24 @ 09:48) >    Diagnosis Line Normal sinus rhythm  Normal ECG  No previous ECGs available    < end of copied text >      ECHO:  < from: TTE W or WO Ultrasound Enhancing Agent (05.24.24 @ 07:29) >  CONCLUSIONS:      1. Left ventricular cavity is normal in size. Left ventricular systolic function is normal with an ejection fraction of 52 % by 3D with an ejection fraction visually estimated at 50 to 55 %.   2. Normal right ventricular cavity size, with normal wall thickness, and normal systolic function.   3. Mild mitral regurgitation.   4. Mild aortic regurgitation.    < end of copied text >              I personally reviewed labs, imaging, ekg, orders and vitals.    Discussed case with:  [X]RN  [X]ROLANDO/GRETTA  [X]Patient  [X]Family  []Specialist:

## 2024-05-26 NOTE — PROGRESS NOTE ADULT - ASSESSMENT
79M with PMH of Lung Ca s/p chemoradiation (last dose of both as per patient in April 2024) and HLD presents with SOB/Cough/Fevers and chills for 2-3 days. onset of cough and MCDONALD 2-3 days prior to admission and since then it has been progressively worsening. Started to have chills 1-2 days prior to admission and these symptoms remains persistent and getting worse. Denies syncope, chest pain, nausea, vomiting, abdominal pain/discomfort. Diarrhea reports (1 episode daily) and non-bloody. Cough productive with yellow and white sputum production. Denies having diagnosis of COPD. Former tobacco use quit many years ago.     In the ER Tmax 100.4F, -108, BP 14/83, RR 16-28, SpO2 85% on RA. WBC 9.49, Hgb 11.1, Na 134 otherwise CMP grossly unremarkable. Lactate 1.9, ProBNP 657, Troponin 116.63->149.07.     < from: CT Angio Chest PE Protocol w/ IV Cont (05.23.24 @ 11:34) >  IMPRESSION:    No pulmonary embolism through the level of the segmental pulmonary   arteries. Evaluation of some subsegmental pulmonary arteries is limited.    Left lower lobe nodular opacity likely known lung cancer. Left parahilar   radiation changes.    Nonspecific diffuse groundglass throughout the right lung, differential   for which includes pneumonitis or infection.    Assessment / plan;  Lung ca s/p chemo RT  suspected pneumonitis  no definite consolidation / pneumonia  adequate oxygenation but hypoxemia on exertion  No hemoptysis or discolored sputum  case discussed with Dr Elder  will fu with you

## 2024-05-27 NOTE — PROGRESS NOTE ADULT - ASSESSMENT
MEDICATIONS  (STANDING):  albuterol    0.083% 2.5 milliGRAM(s) Nebulizer every 6 hours  albuterol    90 MICROgram(s) HFA Inhaler 1 Puff(s) Inhalation every 4 hours  aspirin enteric coated 81 milliGRAM(s) Oral daily  atorvastatin 40 milliGRAM(s) Oral at bedtime  budesonide 160 MICROgram(s)/formoterol 4.5 MICROgram(s) Inhaler 2 Puff(s) Inhalation two times a day  cefepime  Injectable. 2000 milliGRAM(s) IV Push every 8 hours  cefepime  Injectable.      clotrimazole Lozenge 1 Lozenge Oral <User Schedule>  dextrose 10% Bolus 125 milliLiter(s) IV Bolus once  dextrose 5%. 1000 milliLiter(s) (50 mL/Hr) IV Continuous <Continuous>  dextrose 5%. 1000 milliLiter(s) (100 mL/Hr) IV Continuous <Continuous>  dextrose 50% Injectable 25 Gram(s) IV Push once  dextrose 50% Injectable 12.5 Gram(s) IV Push once  doxycycline monohydrate Capsule 100 milliGRAM(s) Oral every 12 hours  enoxaparin Injectable 40 milliGRAM(s) SubCutaneous every 24 hours  glucagon  Injectable 1 milliGRAM(s) IntraMuscular once  insulin glargine Injectable (LANTUS) 10 Unit(s) SubCutaneous every morning  insulin lispro (ADMELOG) corrective regimen sliding scale   SubCutaneous at bedtime  insulin lispro (ADMELOG) corrective regimen sliding scale   SubCutaneous three times a day before meals  insulin lispro Injectable (ADMELOG) 3 Unit(s) SubCutaneous three times a day before meals  lactated ringers. 1000 milliLiter(s) (100 mL/Hr) IV Continuous <Continuous>  lisinopril 10 milliGRAM(s) Oral daily  methylPREDNISolone sodium succinate Injectable 40 milliGRAM(s) IV Push two times a day  metoprolol succinate ER 25 milliGRAM(s) Oral daily    MEDICATIONS  (PRN):  acetaminophen     Tablet .. 650 milliGRAM(s) Oral every 6 hours PRN Temp greater or equal to 38C (100.4F), Mild Pain (1 - 3)  aluminum hydroxide/magnesium hydroxide/simethicone Suspension 30 milliLiter(s) Oral every 4 hours PRN Dyspepsia  benzonatate 100 milliGRAM(s) Oral every 8 hours PRN Cough  dextrose Oral Gel 15 Gram(s) Oral once PRN Blood Glucose LESS THAN 70 milliGRAM(s)/deciliter  guaiFENesin Oral Liquid (Sugar-Free) 100 milliGRAM(s) Oral every 6 hours PRN Cough  melatonin 3 milliGRAM(s) Oral at bedtime PRN Insomnia  ondansetron Injectable 4 milliGRAM(s) IV Push every 8 hours PRN Nausea and/or Vomiting          A/P    Severe Sepsis Secondary to Pneumonia in Immunocompromised Lung Cancer Patient  Acute bronchitis (likely with underlying COPD but denies ever having diagnosis of COPD)  Acute Respiratory Failure with Hypoxia due to above + Likely component of radiation pneumonitis.  Associated elevated troponin secondary to supply/demand ischemia due to above  Lung cancer s/p chemoradiation (last dose reported april 2024).   -Troponins elevated in 100s without significant rise .Telemetry x 24 hours->no events->DC tele  - Cardiology consult.   -CXRAY and CT noted  -Empiric anitbiotics  -TTE unremarkable  -EKG NSR without ST changes.   -Lactate WNL  -Cultures NGTD  -O2 supplementation  -Albuterol/Symbicort  -IV Solumedrol. Down titrate as tolerated  -Hem/Onc consult/recs    HLD  -Continue statin    Prediabetes with Hyperglycemia due to steroids  -Augmented by administration of solumedrol  -A1c 6.4%  -BGM/ISS/Lantus. Titrate accordingly    DVT Prophylaxis: Lovenox subq  Disposition: Still medically active.  Pending improvement in hypoxia. Walking O2 as O2 saturations improved

## 2024-05-27 NOTE — PROGRESS NOTE ADULT - SUBJECTIVE AND OBJECTIVE BOX
CHIEF COMPLAINT: Cough/SOB/Fevers    SUBJECTIVE/SIGNIFICANT INTERVAL EVENTS/OVERNIGHT EVENTS:    5/24: Fever overnight x 1. Now afebrile and feeling better. Cough and SOB improved. No events on telemetry. DC telemetry. Tolerating antibiotics. Denies chest pain, nausea, vomiting, abdominal pain/discomfort, dysuria.    5/25:  Cough  and SOB improved but feeling weak and tired. Afebrile. Denies fever, chills, chest pain, nausea, vomiting.  Still hypoxic 84% on RA at rest. Continue O2 supplementation and downtitrate as tolerated     5/26: feeling better. Cough near resolved. Still with significant hypoxia on exertion. GLuc improved. Continue antibiotics and steroids.     5/27: Interval improvement. Stilll on 3LC NC down from 5LNC. Glucose controlled. Continue antibiotics and steroids.     Review of Systems: 14 Point review of systems reviewed and reported as negative unless otherwise stated above    FROM H&P:  "79M with PMH of Lung Ca s/p chemoradiation (last dose of both as per patient in April 2024) and HLD presents with SOB/Cough/Fevers and chills for 2-3 days. onset of cough and MCDONALD 2-3 days prior to admission and since then it has been progressively worsening. Started to have chills 1-2 days prior to admission and these symptoms remains persistent and getting worse. Denies syncope, chest pain, nausea, vomiting, abdominal pain/discomfort. Diarrhea reports (1 episode daily) and non-bloody. Cough productive with yellow and white sputum production. Denies having diagnosis of COPD. Former tobacco use quit many years ago.     In the ER Tmax 100.4F, -108, BP 14/83, RR 16-28, SpO2 85% on RA. WBC 9.49, Hgb 11.1, Na 134 otherwise CMP grossly unremarkable. Lactate 1.9, ProBNP 657, Troponin 116.63->149.07."    PHYSICAL EXAM:    T(C): 36.4 (05-27-24 @ 07:47), Max: 36.7 (05-26-24 @ 19:45)  HR: 98 (05-27-24 @ 09:02) (78 - 99)  BP: 150/87 (05-27-24 @ 07:47) (149/71 - 150/87)  RR: 18 (05-27-24 @ 07:47) (18 - 19)  SpO2: 94% (05-27-24 @ 09:02) (94% - 96%)  General: AAOx3; NAD: Frail  ENT: Moist Mucous Membranes; No Injury  Neck: Non-tender; No JVD  CVS: RRR, S1&S2, No murmur, No edema  Respiratory: Decreased basilar breath sounds R>L; mild end expiratory wheezing; Interval improvement Normal Respiratory Effort; respiratory support with NC  Abdomen/GI: Soft, non-tender, non-distended, no guarding, no rebound, normal bowel sounds  Extremities: No cyanosis, No clubbing, No edema  Neuro: AAOx3, CNII-XII grossly intact, non-focal  Skin: Clean, Dry and Intact      LABS:                          9.4    6.29  )-----------( 236      ( 24 May 2024 06:41 )             27.7     05-24    135  |  103  |  12  ----------------------------<  242<H>  4.2   |  24  |  0.69    Ca    8.6      24 May 2024 06:41  Phos  3.4     05-24  Mg     1.8     05-24    TPro  6.3  /  Alb  2.1<L>  /  TBili  0.5  /  DBili  x   /  AST  18  /  ALT  26  /  AlkPhos  97  05-24    SARS-CoV-2: [facility] (23 May 2024 09:41)    CAPILLARY BLOOD GLUCOSE    Troponin I, High Sensitivity (05.23.24 @ 16:16)   Troponin I, High Sensitivity Result: 123.38: Troponin I, High Sensitivity (05.23.24 @ 12:47)   Troponin I, High Sensitivity Result: 149.07:Troponin I, High Sensitivity (05.23.24 @ 09:41)   Troponin I, High Sensitivity Result: 116.63: Respiratory Viral Panel with COVID-19 by NATALEE (05.23.24 @ 09:41)   Rapid RVP Result: [facility]  SARS-CoV-2: NotDetecPro-Brain Natriuretic Peptide: 657 pg/mL (05.23.24 @ 09:41) Lactate, Blood: 1.9 mmol/L (05.23.24 @ 09:41)           RADIOLOGY:  < from: CT Angio Chest PE Protocol w/ IV Cont (05.23.24 @ 11:34) >    IMPRESSION:    No pulmonary embolism through the level of the segmental pulmonary   arteries. Evaluation of some subsegmental pulmonary arteries is limited.    Left lower lobe nodular opacity likely known lung cancer. Left parahilar   radiation changes.    Nonspecific diffuse groundglass throughout the right lung, differential   for which includes pneumonitis or infection.    < end of copied text >          EKG:  < from: 12 Lead ECG (05.23.24 @ 09:48) >    Diagnosis Line Normal sinus rhythm  Normal ECG  No previous ECGs available    < end of copied text >      ECHO:  < from: TTE W or WO Ultrasound Enhancing Agent (05.24.24 @ 07:29) >  CONCLUSIONS:      1. Left ventricular cavity is normal in size. Left ventricular systolic function is normal with an ejection fraction of 52 % by 3D with an ejection fraction visually estimated at 50 to 55 %.   2. Normal right ventricular cavity size, with normal wall thickness, and normal systolic function.   3. Mild mitral regurgitation.   4. Mild aortic regurgitation.    < end of copied text >              I personally reviewed labs, imaging, ekg, orders and vitals.    Discussed case with:  [X]RN  [X]CM/GRETTA  [X]Patient  [X]Family  []Specialist:

## 2024-05-28 NOTE — PROGRESS NOTE ADULT - SUBJECTIVE AND OBJECTIVE BOX
Pt seen, feeling well overall, symptoms improving    MEDICATIONS  (STANDING):  albuterol    0.083% 2.5 milliGRAM(s) Nebulizer every 6 hours  albuterol    90 MICROgram(s) HFA Inhaler 1 Puff(s) Inhalation every 4 hours  aspirin enteric coated 81 milliGRAM(s) Oral daily  atorvastatin 40 milliGRAM(s) Oral at bedtime  budesonide 160 MICROgram(s)/formoterol 4.5 MICROgram(s) Inhaler 2 Puff(s) Inhalation two times a day  cefepime  Injectable. 2000 milliGRAM(s) IV Push every 8 hours  cefepime  Injectable.      clotrimazole Lozenge 1 Lozenge Oral <User Schedule>  dextrose 10% Bolus 125 milliLiter(s) IV Bolus once  dextrose 5%. 1000 milliLiter(s) (50 mL/Hr) IV Continuous <Continuous>  dextrose 5%. 1000 milliLiter(s) (100 mL/Hr) IV Continuous <Continuous>  dextrose 50% Injectable 25 Gram(s) IV Push once  dextrose 50% Injectable 12.5 Gram(s) IV Push once  doxycycline monohydrate Capsule 100 milliGRAM(s) Oral every 12 hours  enoxaparin Injectable 40 milliGRAM(s) SubCutaneous every 24 hours  glucagon  Injectable 1 milliGRAM(s) IntraMuscular once  insulin lispro (ADMELOG) corrective regimen sliding scale   SubCutaneous three times a day before meals  insulin lispro (ADMELOG) corrective regimen sliding scale   SubCutaneous at bedtime  lactated ringers. 1000 milliLiter(s) (100 mL/Hr) IV Continuous <Continuous>  lisinopril 10 milliGRAM(s) Oral daily  metoprolol succinate ER 25 milliGRAM(s) Oral daily  tiotropium 2.5 MICROgram(s) Inhaler 2 Puff(s) Inhalation daily    MEDICATIONS  (PRN):  acetaminophen     Tablet .. 650 milliGRAM(s) Oral every 6 hours PRN Temp greater or equal to 38C (100.4F), Mild Pain (1 - 3)  aluminum hydroxide/magnesium hydroxide/simethicone Suspension 30 milliLiter(s) Oral every 4 hours PRN Dyspepsia  benzonatate 100 milliGRAM(s) Oral every 8 hours PRN Cough  dextrose Oral Gel 15 Gram(s) Oral once PRN Blood Glucose LESS THAN 70 milliGRAM(s)/deciliter  guaiFENesin Oral Liquid (Sugar-Free) 100 milliGRAM(s) Oral every 6 hours PRN Cough  melatonin 3 milliGRAM(s) Oral at bedtime PRN Insomnia  ondansetron Injectable 4 milliGRAM(s) IV Push every 8 hours PRN Nausea and/or Vomiting      ROS  No fever, sweats, chills      Vital Signs Last 24 Hrs  T(C): 36.5 (28 May 2024 07:30), Max: 36.5 (28 May 2024 07:30)  T(F): 97.7 (28 May 2024 07:30), Max: 97.7 (28 May 2024 07:30)  HR: 106 (28 May 2024 14:25) (90 - 106)  BP: 132/65 (28 May 2024 12:45) (120/68 - 132/65)  BP(mean): 79 (28 May 2024 12:45) (79 - 79)  RR: 18 (28 May 2024 12:45) (18 - 18)  SpO2: 95% (28 May 2024 12:45) (94% - 95%)    Parameters below as of 28 May 2024 14:25  Patient On (Oxygen Delivery Method): nasal cannula        PE  NAD  Awake, alert    No c/c/e  No rash grossly  FROM                          11.6   12.67 )-----------( 289      ( 28 May 2024 07:24 )             35.2       05-28    135  |  101  |  25<H>  ----------------------------<  182<H>  4.9   |  28  |  0.64    Ca    9.1      28 May 2024 07:24  Phos  4.4     05-28    TPro  6.6  /  Alb  2.6<L>  /  TBili  0.8  /  DBili  x   /  AST  27  /  ALT  68  /  AlkPhos  107  05-28

## 2024-05-28 NOTE — PROGRESS NOTE ADULT - ASSESSMENT
A/P    Severe Sepsis Secondary to Pneumonia in Immunocompromised Lung Cancer Patient  Acute bronchitis (likely with underlying COPD but denies ever having diagnosis of COPD)  Acute Respiratory Failure with Hypoxia due to above + Likely component of radiation pneumonitis.  Associated elevated troponin secondary to supply/demand ischemia due to above  Lung cancer s/p chemoradiation (last dose reported april 2024).   -Troponins elevated in 100s without significant rise .Telemetry x 24 hours->no events->DC tele  - Cardiology consult.   -CXRAY and CT noted  -Empiric anitbiotics  -TTE unremarkable  -EKG NSR without ST changes.   -Lactate WNL  -Cultures NGTD  -O2 supplementation  -Albuterol/Symbicort  -IV Solumedrol. Down titrate as tolerated  -Repeat CXRAY (5/27) with vascular congestion (likely fluid retention from steroids). IV Lasix 20mg x 1 ordered on 5/28. Continue to monitor.   -Hem/Onc consult/recs    HLD  -Continue statin    Prediabetes with Hyperglycemia due to steroids  -Augmented by administration of solumedrol  -A1c 6.4%  -BGM/ISS/Lantus. Titrate accordingly. With solumedrol decrease on 5/28. DC premeal insulin and decreased lantus. Continue to titrate accordingly    DVT Prophylaxis: Lovenox subq  Disposition: Still medically active.  Pending improvement in hypoxia. Walking O2 as O2 saturations improved

## 2024-05-28 NOTE — PROGRESS NOTE ADULT - SUBJECTIVE AND OBJECTIVE BOX
Patient is a 79y old  Male who presents with a chief complaint of Cough/SOB/Fevers (24 May 2024 22:41)      HPI:  79M with PMH of Lung Ca s/p chemoradiation (last dose of both as per patient in April 2024) and HLD presents with SOB/Cough/Fevers and chills for 2-3 days. onset of cough and MCDONALD 2-3 days prior to admission and since then it has been progressively worsening. Started to have chills 1-2 days prior to admission and these symptoms remains persistent and getting worse. Denies syncope, chest pain, nausea, vomiting, abdominal pain/discomfort. Diarrhea reports (1 episode daily) and non-bloody. Cough productive with yellow and white sputum production. Denies having diagnosis of COPD. Former tobacco use quit many years ago.     In the ER Tmax 100.4F, -108, BP 14/83, RR 16-28, SpO2 85% on RA. WBC 9.49, Hgb 11.1, Na 134 otherwise CMP grossly unremarkable. Lactate 1.9, ProBNP 657, Troponin 116.63->149.07.     < from: CT Angio Chest PE Protocol w/ IV Cont (05.23.24 @ 11:34) >  IMPRESSION:    No pulmonary embolism through the level of the segmental pulmonary   arteries. Evaluation of some subsegmental pulmonary arteries is limited.    Left lower lobe nodular opacity likely known lung cancer. Left parahilar   radiation changes.    Nonspecific diffuse groundglass throughout the right lung, differential   for which includes pneumonitis or infection.    < end of copied text >       (23 May 2024 16:38)      PAST MEDICAL & SURGICAL HISTORY:  HLD (hyperlipidemia)      Hyperlipemia      Lung cancer      No significant past surgical history          PREVIOUS DIAGNOSTIC TESTING:      MEDICATIONS  (STANDING):  albuterol    0.083% 2.5 milliGRAM(s) Nebulizer every 6 hours  albuterol    90 MICROgram(s) HFA Inhaler 1 Puff(s) Inhalation every 4 hours  aspirin enteric coated 81 milliGRAM(s) Oral daily  atorvastatin 40 milliGRAM(s) Oral at bedtime  budesonide 160 MICROgram(s)/formoterol 4.5 MICROgram(s) Inhaler 2 Puff(s) Inhalation two times a day  cefepime  Injectable. 2000 milliGRAM(s) IV Push every 8 hours  cefepime  Injectable.      clotrimazole Lozenge 1 Lozenge Oral <User Schedule>  dextrose 10% Bolus 125 milliLiter(s) IV Bolus once  dextrose 5%. 1000 milliLiter(s) (50 mL/Hr) IV Continuous <Continuous>  dextrose 5%. 1000 milliLiter(s) (100 mL/Hr) IV Continuous <Continuous>  dextrose 50% Injectable 25 Gram(s) IV Push once  dextrose 50% Injectable 12.5 Gram(s) IV Push once  doxycycline monohydrate Capsule 100 milliGRAM(s) Oral every 12 hours  enoxaparin Injectable 40 milliGRAM(s) SubCutaneous every 24 hours  glucagon  Injectable 1 milliGRAM(s) IntraMuscular once  insulin glargine Injectable (LANTUS) 10 Unit(s) SubCutaneous every morning  insulin lispro (ADMELOG) corrective regimen sliding scale   SubCutaneous three times a day before meals  insulin lispro (ADMELOG) corrective regimen sliding scale   SubCutaneous at bedtime  insulin lispro Injectable (ADMELOG) 3 Unit(s) SubCutaneous three times a day before meals  lactated ringers. 1000 milliLiter(s) (100 mL/Hr) IV Continuous <Continuous>  lisinopril 10 milliGRAM(s) Oral daily  methylPREDNISolone sodium succinate Injectable 40 milliGRAM(s) IV Push two times a day  metoprolol succinate ER 25 milliGRAM(s) Oral daily    MEDICATIONS  (PRN):  acetaminophen     Tablet .. 650 milliGRAM(s) Oral every 6 hours PRN Temp greater or equal to 38C (100.4F), Mild Pain (1 - 3)  aluminum hydroxide/magnesium hydroxide/simethicone Suspension 30 milliLiter(s) Oral every 4 hours PRN Dyspepsia  benzonatate 100 milliGRAM(s) Oral every 8 hours PRN Cough  dextrose Oral Gel 15 Gram(s) Oral once PRN Blood Glucose LESS THAN 70 milliGRAM(s)/deciliter  guaiFENesin Oral Liquid (Sugar-Free) 100 milliGRAM(s) Oral every 6 hours PRN Cough  melatonin 3 milliGRAM(s) Oral at bedtime PRN Insomnia  ondansetron Injectable 4 milliGRAM(s) IV Push every 8 hours PRN Nausea and/or Vomiting      FAMILY HISTORY:  No pertinent family history in first degree relatives        SOCIAL HISTORY:  ***    REVIEW OF SYSTEM:  Pertinent items are noted in HPI.      Vital Signs Last 24 Hrs  T(C): 36.3 (27 May 2024 22:10), Max: 36.4 (27 May 2024 07:47)  T(F): 97.3 (27 May 2024 22:10), Max: 97.6 (27 May 2024 07:47)  HR: 91 (27 May 2024 22:10) (90 - 99)  BP: 120/68 (27 May 2024 22:10) (120/68 - 150/87)  BP(mean): --  RR: 18 (27 May 2024 22:10) (18 - 18)  SpO2: 95% (27 May 2024 22:10) (94% - 95%)    Parameters below as of 27 May 2024 22:10  Patient On (Oxygen Delivery Method): nasal cannula  O2 Flow (L/min): 5      PHYSICAL EXAM  General Appearance: cooperative, no acute distress,   HEENT: PERRL, conjunctiva clear, EOM's intact, non injected pharynx, no exudate, TM   normal  Neck: Supple, , no adenopathy, thyroid: not enlarged, no carotid bruit or JVD  Back: Symmetric, no  tenderness,no soft tissue tenderness  Lungs: Clear to auscultation bilateral,no adventitious breath sounds, normal   expiratory phase  Heart: Regular rate and rhythm, S1, S2 normal, no murmur, rub or gallop  Abdomen: Soft, non-tender, bowel sounds active , no hepatosplenomegaly  Extremities: no cyanosis or edema, no joint swelling  Skin: Skin color, texture normal, no rashes   Neurologic: Alert and oriented X3 , cranial nerves intact, sensory and motor normal,    ECG:    LABS:                          9.4    6.29  )-----------( 236      ( 24 May 2024 06:41 )             27.7     05-24    135  |  103  |  12  ----------------------------<  242<H>  4.2   |  24  |  0.69    Ca    8.6      24 May 2024 06:41  Phos  3.4     05-24  Mg     1.8     05-24    TPro  6.3  /  Alb  2.1<L>  /  TBili  0.5  /  DBili  x   /  AST  18  /  ALT  26  /  AlkPhos  97  05-24              Urinalysis Basic - ( 24 May 2024 06:41 )    Color: x / Appearance: x / SG: x / pH: x  Gluc: 242 mg/dL / Ketone: x  / Bili: x / Urobili: x   Blood: x / Protein: x / Nitrite: x   Leuk Esterase: x / RBC: x / WBC x   Sq Epi: x / Non Sq Epi: x / Bacteria: x            RADIOLOGY & ADDITIONAL STUDIES:  < from: CT Angio Chest PE Protocol w/ IV Cont (05.23.24 @ 11:34) >  IMPRESSION:    No pulmonary embolism through the level of the segmental pulmonary   arteries. Evaluation of some subsegmental pulmonary arteries is limited.    Left lower lobe nodular opacity likely known lung cancer. Left parahilar   radiation changes.    Nonspecific diffuse groundglass throughout the right lung, differential   for which includes pneumonitis or infection.    < end of copied text >

## 2024-05-28 NOTE — PROGRESS NOTE ADULT - SUBJECTIVE AND OBJECTIVE BOX
CHIEF COMPLAINT: Cough/SOB/Fevers    SUBJECTIVE/SIGNIFICANT INTERVAL EVENTS/OVERNIGHT EVENTS:    5/24: Fever overnight x 1. Now afebrile and feeling better. Cough and SOB improved. No events on telemetry. DC telemetry. Tolerating antibiotics. Denies chest pain, nausea, vomiting, abdominal pain/discomfort, dysuria.    5/25:  Cough  and SOB improved but feeling weak and tired. Afebrile. Denies fever, chills, chest pain, nausea, vomiting.  Still hypoxic 84% on RA at rest. Continue O2 supplementation and downtitrate as tolerated     5/26: feeling better. Cough near resolved. Still with significant hypoxia on exertion. GLuc improved. Continue antibiotics and steroids.     5/27: Interval improvement. Stilll on 3LC NC down from 5LNC. Glucose controlled. Continue antibiotics and steroids.     5/28: Still on 5L NC at rest. 4L with SpO2 88-90%. CXRAY with some vascular congestion. TTE unremarkable. No more wheezing. Decrease steroids to qd. IV Lasix 20mg x 1 today. Continue to assess. Repeat CT chest ordered for re-evaluation because despite antibiotics and steroids, no real change in O2 supplementation requirement. Initial CTA negative for PE and has been on DVT prophylaxis: Fevers have resolved.     Review of Systems: 14 Point review of systems reviewed and reported as negative unless otherwise stated above    FROM H&P:  "79M with PMH of Lung Ca s/p chemoradiation (last dose of both as per patient in April 2024) and HLD presents with SOB/Cough/Fevers and chills for 2-3 days. onset of cough and MCDONALD 2-3 days prior to admission and since then it has been progressively worsening. Started to have chills 1-2 days prior to admission and these symptoms remains persistent and getting worse. Denies syncope, chest pain, nausea, vomiting, abdominal pain/discomfort. Diarrhea reports (1 episode daily) and non-bloody. Cough productive with yellow and white sputum production. Denies having diagnosis of COPD. Former tobacco use quit many years ago.     In the ER Tmax 100.4F, -108, BP 14/83, RR 16-28, SpO2 85% on RA. WBC 9.49, Hgb 11.1, Na 134 otherwise CMP grossly unremarkable. Lactate 1.9, ProBNP 657, Troponin 116.63->149.07."    PHYSICAL EXAM:    T(C): 36.5 (05-28-24 @ 07:30), Max: 36.5 (05-28-24 @ 07:30)  HR: 106 (05-28-24 @ 14:25) (90 - 106)  BP: 132/65 (05-28-24 @ 12:45) (120/68 - 132/65)  RR: 18 (05-28-24 @ 12:45) (18 - 18)  SpO2: 95% (05-28-24 @ 12:45) (94% - 95%)  General: AAOx3; NAD: Frail  ENT: Moist Mucous Membranes; No Injury  Neck: Non-tender; No JVD  CVS: RRR, S1&S2, No murmur, No edema  Respiratory: Decreased basilar breath sounds R>L; ; Interval improvement Normal Respiratory Effort; respiratory support with NC  Abdomen/GI: Soft, non-tender, non-distended, no guarding, no rebound, normal bowel sounds  Extremities: No cyanosis, No clubbing, No edema  Neuro: AAOx3, CNII-XII grossly intact, non-focal  Skin: Clean, Dry and Intact      LABS:                        11.6   12.67 )-----------( 289      ( 28 May 2024 07:24 )             35.2     05-28    135  |  101  |  25<H>  ----------------------------<  182<H>  4.9   |  28  |  0.64    Ca    9.1      28 May 2024 07:24  Phos  4.4     05-28    TPro  6.6  /  Alb  2.6<L>  /  TBili  0.8  /  DBili  x   /  AST  27  /  ALT  68  /  AlkPhos  107  05-28    SARS-CoV-2: NotDetec (23 May 2024 09:41)    CAPILLARY BLOOD GLUCOSE      POCT Blood Glucose.: 262 mg/dL (28 May 2024 12:14)  POCT Blood Glucose.: 169 mg/dL (28 May 2024 08:16)  POCT Blood Glucose.: 86 mg/dL (27 May 2024 21:17)  POCT Blood Glucose.: 154 mg/dL (27 May 2024 16:54)                            9.4    6.29  )-----------( 236      ( 24 May 2024 06:41 )             27.7     05-24    135  |  103  |  12  ----------------------------<  242<H>  4.2   |  24  |  0.69    Ca    8.6      24 May 2024 06:41  Phos  3.4     05-24  Mg     1.8     05-24    TPro  6.3  /  Alb  2.1<L>  /  TBili  0.5  /  DBili  x   /  AST  18  /  ALT  26  /  AlkPhos  97  05-24    SARS-CoV-2: NotDete (23 May 2024 09:41)    CAPILLARY BLOOD GLUCOSE    Troponin I, High Sensitivity (05.23.24 @ 16:16)   Troponin I, High Sensitivity Result: 123.38: Troponin I, High Sensitivity (05.23.24 @ 12:47)   Troponin I, High Sensitivity Result: 149.07:Troponin I, High Sensitivity (05.23.24 @ 09:41)   Troponin I, High Sensitivity Result: 116.63: Respiratory Viral Panel with COVID-19 by NATALEE (05.23.24 @ 09:41)   Rapid RVP Result: [facility]  SARS-CoV-2: NotDetecPro-Brain Natriuretic Peptide: 657 pg/mL (05.23.24 @ 09:41) Lactate, Blood: 1.9 mmol/L (05.23.24 @ 09:41)           RADIOLOGY:  < from: CT Angio Chest PE Protocol w/ IV Cont (05.23.24 @ 11:34) >    IMPRESSION:    No pulmonary embolism through the level of the segmental pulmonary   arteries. Evaluation of some subsegmental pulmonary arteries is limited.    Left lower lobe nodular opacity likely known lung cancer. Left parahilar   radiation changes.    Nonspecific diffuse groundglass throughout the right lung, differential   for which includes pneumonitis or infection.    < end of copied text >          EKG:  < from: 12 Lead ECG (05.23.24 @ 09:48) >    Diagnosis Line Normal sinus rhythm  Normal ECG  No previous ECGs available    < end of copied text >      ECHO:  < from: TTE W or WO Ultrasound Enhancing Agent (05.24.24 @ 07:29) >  CONCLUSIONS:      1. Left ventricular cavity is normal in size. Left ventricular systolic function is normal with an ejection fraction of 52 % by 3D with an ejection fraction visually estimated at 50 to 55 %.   2. Normal right ventricular cavity size, with normal wall thickness, and normal systolic function.   3. Mild mitral regurgitation.   4. Mild aortic regurgitation.    < end of copied text >              I personally reviewed labs, imaging, ekg, orders and vitals.    Discussed case with:  [X]RN  [X]ROLANDO/GRETTA  [X]Patient  [X]Family  []Specialist:

## 2024-05-28 NOTE — PROGRESS NOTE ADULT - ASSESSMENT
79M with PMH of Lung Ca s/p chemoradiation (last dose of both as per patient in April 2024) and HLD presents with SOB/Cough/Fevers and chills for 2-3 days. onset of cough and MCDONALD 2-3 days prior to admission and since then it has been progressively worsening. Started to have chills 1-2 days prior to admission and these symptoms remains persistent and getting worse. Denies syncope, chest pain, nausea, vomiting, abdominal pain/discomfort. Diarrhea reports (1 episode daily) and non-bloody. Cough productive with yellow and white sputum production. Denies having diagnosis of COPD. Former tobacco use quit many years ago.     In the ER Tmax 100.4F, -108, BP 14/83, RR 16-28, SpO2 85% on RA. WBC 9.49, Hgb 11.1, Na 134 otherwise CMP grossly unremarkable. Lactate 1.9, ProBNP 657, Troponin 116.63->149.07.     < from: CT Angio Chest PE Protocol w/ IV Cont (05.23.24 @ 11:34) >  IMPRESSION:    No pulmonary embolism through the level of the segmental pulmonary   arteries. Evaluation of some subsegmental pulmonary arteries is limited.    Left lower lobe nodular opacity likely known lung cancer. Left parahilar   radiation changes.    Nonspecific diffuse groundglass throughout the right lung, differential   for which includes pneumonitis or infection.    Assessment / plan;  Lung ca s/p chemo RT  suspected pneumonitis  no definite consolidation / pneumonia  adequate oxygenation but hypoxemia on exertion  No hemoptysis or discolored sputum  case discussed with Dr Elder  complete course of antibiotics  slow steroid taper  mobilize OOb and re eval oxygenation on exertion  will fu with you

## 2024-05-28 NOTE — PROGRESS NOTE ADULT - ASSESSMENT
Stage III lung cancer  S/P neoadjuvant Immune therapy + Chemotherapy with a good response  S/P Taxol/ Carb + RT  Now possible pneumonia/pneumonitis  Anemia   Elevated Troponin         PLAN    Antibiotics/ steroids as per Pulm / medicine- steroids are being tapered  No evidence of progressive cancer  Procrit for progressive anemia however not needed today    Thank you for the courtesy of this consultation and we will continue to follow.    Thony Morris MD  [facility]  Cell: [phone]

## 2024-05-29 NOTE — DISCHARGE NOTE NURSING/CASE MANAGEMENT/SOCIAL WORK - PATIENT PORTAL LINK FT
You can access the FollowMyHealth Patient Portal offered by [facility] by registering at the following website: [email or web address]. By joining [employer]’s FollowMyHealth portal, you will also be able to view your health information using other applications (apps) compatible with our system.

## 2024-05-29 NOTE — PROGRESS NOTE ADULT - ASSESSMENT
Stage III lung cancer  S/P neoadjuvant Immune therapy + Chemotherapy with a good response  S/P Taxol/ Carb + RT  Now possible pneumonia/pneumonitis  Anemia   Elevated Troponin         PLAN    Antibiotics/ steroids as per Pulm / medicine- steroids are being tapered  No evidence of progressive cancer  Procrit for progressive anemia however not needed today    Thank you for the courtesy of this consultation and we will continue to follow.    Thony Morris MD  [facility]  Cell: [phone]

## 2024-05-29 NOTE — PROGRESS NOTE ADULT - SUBJECTIVE AND OBJECTIVE BOX
CHIEF COMPLAINT: Cough/SOB/Fevers    SUBJECTIVE/SIGNIFICANT INTERVAL EVENTS/OVERNIGHT EVENTS:    5/24: Fever overnight x 1. Now afebrile and feeling better. Cough and SOB improved. No events on telemetry. DC telemetry. Tolerating antibiotics. Denies chest pain, nausea, vomiting, abdominal pain/discomfort, dysuria.    5/25:  Cough  and SOB improved but feeling weak and tired. Afebrile. Denies fever, chills, chest pain, nausea, vomiting.  Still hypoxic 84% on RA at rest. Continue O2 supplementation and downtitrate as tolerated     5/26: feeling better. Cough near resolved. Still with significant hypoxia on exertion. GLuc improved. Continue antibiotics and steroids.     5/27: Interval improvement. Stilll on 3LC NC down from 5LNC. Glucose controlled. Continue antibiotics and steroids.     5/28: Still on 5L NC at rest. 4L with SpO2 88-90%. CXRAY with some vascular congestion. TTE unremarkable. No more wheezing. Decrease steroids to qd. IV Lasix 20mg x 1 today. Continue to assess. Repeat CT chest ordered for re-evaluation because despite antibiotics and steroids, no real change in O2 supplementation requirement. Initial CTA negative for PE and has been on DVT prophylaxis: Fevers have resolved.     5/29: SOB and breathing reported with mild interval improvement from yesterday. Continue solumedrol daily. Additiona dose of IV lasix administered with reported improved pleural effusion on repeat CT. Other findings on CT unchanged from admission? Glucose lower with solumedrol down titration. DC lantus and continue BGM/ISS.     Review of Systems: 14 Point review of systems reviewed and reported as negative unless otherwise stated above    FROM H&P:  "79M with PMH of Lung Ca s/p chemoradiation (last dose of both as per patient in April 2024) and HLD presents with SOB/Cough/Fevers and chills for 2-3 days. onset of cough and MCDONALD 2-3 days prior to admission and since then it has been progressively worsening. Started to have chills 1-2 days prior to admission and these symptoms remains persistent and getting worse. Denies syncope, chest pain, nausea, vomiting, abdominal pain/discomfort. Diarrhea reports (1 episode daily) and non-bloody. Cough productive with yellow and white sputum production. Denies having diagnosis of COPD. Former tobacco use quit many years ago.     In the ER Tmax 100.4F, -108, BP 14/83, RR 16-28, SpO2 85% on RA. WBC 9.49, Hgb 11.1, Na 134 otherwise CMP grossly unremarkable. Lactate 1.9, ProBNP 657, Troponin 116.63->149.07."    PHYSICAL EXAM:    T(C): 36.6 (05-29-24 @ 07:20), Max: 36.6 (05-29-24 @ 07:20)  HR: 92 (05-29-24 @ 08:20) (92 - 106)  BP: 128/76 (05-29-24 @ 07:20) (120/64 - 132/65)  RR: 18 (05-29-24 @ 07:20) (18 - 18)  SpO2: 94% (05-29-24 @ 08:20) (91% - 95%)  General: AAOx3; NAD: Frail  ENT: Moist Mucous Membranes; No Injury  Neck: Non-tender; No JVD  CVS: RRR, S1&S2, No murmur, No edema  Respiratory: Decreased basilar breath sounds R>L; ; Interval improvement Normal Respiratory Effort; respiratory support with NC  Abdomen/GI: Soft, non-tender, non-distended, no guarding, no rebound, normal bowel sounds  Extremities: No cyanosis, No clubbing, No edema  Neuro: AAOx3, CNII-XII grossly intact, non-focal  Skin: Clean, Dry and Intact      LABS:                        11.6   12.67 )-----------( 289      ( 28 May 2024 07:24 )             35.2     05-28    135  |  101  |  25<H>  ----------------------------<  182<H>  4.9   |  28  |  0.64    Ca    9.1      28 May 2024 07:24  Phos  4.4     05-28    TPro  6.6  /  Alb  2.6<L>  /  TBili  0.8  /  DBili  x   /  AST  27  /  ALT  68  /  AlkPhos  107  05-28    SARS-CoV-2: NotDetec (23 May 2024 09:41)    CAPILLARY BLOOD GLUCOSE      POCT Blood Glucose.: 262 mg/dL (28 May 2024 12:14)  POCT Blood Glucose.: 169 mg/dL (28 May 2024 08:16)  POCT Blood Glucose.: 86 mg/dL (27 May 2024 21:17)  POCT Blood Glucose.: 154 mg/dL (27 May 2024 16:54)                            9.4    6.29  )-----------( 236      ( 24 May 2024 06:41 )             27.7     05-24    135  |  103  |  12  ----------------------------<  242<H>  4.2   |  24  |  0.69    Ca    8.6      24 May 2024 06:41  Phos  3.4     05-24  Mg     1.8     05-24    TPro  6.3  /  Alb  2.1<L>  /  TBili  0.5  /  DBili  x   /  AST  18  /  ALT  26  /  AlkPhos  97  05-24    SARS-CoV-2: [facility] (23 May 2024 09:41)    CAPILLARY BLOOD GLUCOSE    Troponin I, High Sensitivity (05.23.24 @ 16:16)   Troponin I, High Sensitivity Result: 123.38: Troponin I, High Sensitivity (05.23.24 @ 12:47)   Troponin I, High Sensitivity Result: 149.07:Troponin I, High Sensitivity (05.23.24 @ 09:41)   Troponin I, High Sensitivity Result: 116.63: Respiratory Viral Panel with COVID-19 by NATALEE (05.23.24 @ 09:41)   Rapid RVP Result: [facility]  SARS-CoV-2: NotDetecPro-Brain Natriuretic Peptide: 657 pg/mL (05.23.24 @ 09:41) Lactate, Blood: 1.9 mmol/L (05.23.24 @ 09:41)           RADIOLOGY:  < from: CT Angio Chest PE Protocol w/ IV Cont (05.23.24 @ 11:34) >    IMPRESSION:    No pulmonary embolism through the level of the segmental pulmonary   arteries. Evaluation of some subsegmental pulmonary arteries is limited.    Left lower lobe nodular opacity likely known lung cancer. Left parahilar   radiation changes.    Nonspecific diffuse groundglass throughout the right lung, differential   for which includes pneumonitis or infection.    < end of copied text >          EKG:  < from: 12 Lead ECG (05.23.24 @ 09:48) >    Diagnosis Line Normal sinus rhythm  Normal ECG  No previous ECGs available    < end of copied text >      ECHO:  < from: TTE W or WO Ultrasound Enhancing Agent (05.24.24 @ 07:29) >  CONCLUSIONS:      1. Left ventricular cavity is normal in size. Left ventricular systolic function is normal with an ejection fraction of 52 % by 3D with an ejection fraction visually estimated at 50 to 55 %.   2. Normal right ventricular cavity size, with normal wall thickness, and normal systolic function.   3. Mild mitral regurgitation.   4. Mild aortic regurgitation.    < end of copied text >              I personally reviewed labs, imaging, ekg, orders and vitals.    Discussed case with:  [X]RN  [X]CM/GRETTA  [X]Patient  [X]Family  []Specialist:

## 2024-05-29 NOTE — PROGRESS NOTE ADULT - SUBJECTIVE AND OBJECTIVE BOX
Patient with SOB but improving with diuresis      MEDICATIONS  (STANDING):  albuterol    0.083% 2.5 milliGRAM(s) Nebulizer every 6 hours  albuterol    90 MICROgram(s) HFA Inhaler 1 Puff(s) Inhalation every 4 hours  aspirin enteric coated 81 milliGRAM(s) Oral daily  atorvastatin 40 milliGRAM(s) Oral at bedtime  budesonide 160 MICROgram(s)/formoterol 4.5 MICROgram(s) Inhaler 2 Puff(s) Inhalation two times a day  cefepime  Injectable.      cefepime  Injectable. 2000 milliGRAM(s) IV Push every 8 hours  clotrimazole Lozenge 1 Lozenge Oral <User Schedule>  dextrose 10% Bolus 125 milliLiter(s) IV Bolus once  dextrose 5%. 1000 milliLiter(s) (50 mL/Hr) IV Continuous <Continuous>  dextrose 5%. 1000 milliLiter(s) (100 mL/Hr) IV Continuous <Continuous>  dextrose 50% Injectable 25 Gram(s) IV Push once  dextrose 50% Injectable 12.5 Gram(s) IV Push once  doxycycline monohydrate Capsule 100 milliGRAM(s) Oral every 12 hours  enoxaparin Injectable 40 milliGRAM(s) SubCutaneous every 24 hours  glucagon  Injectable 1 milliGRAM(s) IntraMuscular once  insulin lispro (ADMELOG) corrective regimen sliding scale   SubCutaneous at bedtime  insulin lispro (ADMELOG) corrective regimen sliding scale   SubCutaneous three times a day before meals  lisinopril 10 milliGRAM(s) Oral daily  methylPREDNISolone sodium succinate Injectable 40 milliGRAM(s) IV Push daily  metoprolol succinate ER 25 milliGRAM(s) Oral daily  tiotropium 2.5 MICROgram(s) Inhaler 2 Puff(s) Inhalation daily    MEDICATIONS  (PRN):  acetaminophen     Tablet .. 650 milliGRAM(s) Oral every 6 hours PRN Temp greater or equal to 38C (100.4F), Mild Pain (1 - 3)  aluminum hydroxide/magnesium hydroxide/simethicone Suspension 30 milliLiter(s) Oral every 4 hours PRN Dyspepsia  benzonatate 100 milliGRAM(s) Oral every 8 hours PRN Cough  dextrose Oral Gel 15 Gram(s) Oral once PRN Blood Glucose LESS THAN 70 milliGRAM(s)/deciliter  guaiFENesin Oral Liquid (Sugar-Free) 100 milliGRAM(s) Oral every 6 hours PRN Cough  melatonin 3 milliGRAM(s) Oral at bedtime PRN Insomnia  ondansetron Injectable 4 milliGRAM(s) IV Push every 8 hours PRN Nausea and/or Vomiting      ROS  +SOB    Vital Signs Last 24 Hrs  T(C): 36.6 (29 May 2024 07:20), Max: 36.6 (29 May 2024 07:20)  T(F): 97.9 (29 May 2024 07:20), Max: 97.9 (29 May 2024 07:20)  HR: 91 (29 May 2024 14:32) (91 - 94)  BP: 128/76 (29 May 2024 07:20) (120/64 - 128/76)  BP(mean): --  RR: 18 (29 May 2024 07:20) (18 - 18)  SpO2: 94% (29 May 2024 08:20) (91% - 94%)    Parameters below as of 29 May 2024 08:20  Patient On (Oxygen Delivery Method): nasal cannula        PE  NAD  Awake, alert    No rash grossly  FROM                          11.6   12.67 )-----------( 289      ( 28 May 2024 07:24 )             35.2       05-28    135  |  101  |  25<H>  ----------------------------<  182<H>  4.9   |  28  |  0.64    Ca    9.1      28 May 2024 07:24  Phos  4.4     05-28    TPro  6.6  /  Alb  2.6<L>  /  TBili  0.8  /  DBili  x   /  AST  27  /  ALT  68  /  AlkPhos  107  05-28

## 2024-05-29 NOTE — PROGRESS NOTE ADULT - SUBJECTIVE AND OBJECTIVE BOX
Patient is a 79y old  Male who presents with a chief complaint of Cough/SOB/Fevers (24 May 2024 22:41)      HPI:  79M with PMH of Lung Ca s/p chemoradiation (last dose of both as per patient in April 2024) and HLD presents with SOB/Cough/Fevers and chills for 2-3 days. onset of cough and MCDONALD 2-3 days prior to admission and since then it has been progressively worsening. Started to have chills 1-2 days prior to admission and these symptoms remains persistent and getting worse. Denies syncope, chest pain, nausea, vomiting, abdominal pain/discomfort. Diarrhea reports (1 episode daily) and non-bloody. Cough productive with yellow and white sputum production. Denies having diagnosis of COPD. Former tobacco use quit many years ago.     In the ER Tmax 100.4F, -108, BP 14/83, RR 16-28, SpO2 85% on RA. WBC 9.49, Hgb 11.1, Na 134 otherwise CMP grossly unremarkable. Lactate 1.9, ProBNP 657, Troponin 116.63->149.07.     < from: CT Angio Chest PE Protocol w/ IV Cont (05.23.24 @ 11:34) >  IMPRESSION:    No pulmonary embolism through the level of the segmental pulmonary   arteries. Evaluation of some subsegmental pulmonary arteries is limited.    Left lower lobe nodular opacity likely known lung cancer. Left parahilar   radiation changes.    Nonspecific diffuse groundglass throughout the right lung, differential   for which includes pneumonitis or infection.    < end of copied text >       (23 May 2024 16:38)      PAST MEDICAL & SURGICAL HISTORY:  HLD (hyperlipidemia)      Hyperlipemia      Lung cancer      No significant past surgical history          PREVIOUS DIAGNOSTIC TESTING:      MEDICATIONS  (STANDING):  albuterol    0.083% 2.5 milliGRAM(s) Nebulizer every 6 hours  albuterol    90 MICROgram(s) HFA Inhaler 1 Puff(s) Inhalation every 4 hours  aspirin enteric coated 81 milliGRAM(s) Oral daily  atorvastatin 40 milliGRAM(s) Oral at bedtime  budesonide 160 MICROgram(s)/formoterol 4.5 MICROgram(s) Inhaler 2 Puff(s) Inhalation two times a day  cefepime  Injectable. 2000 milliGRAM(s) IV Push every 8 hours  cefepime  Injectable.      clotrimazole Lozenge 1 Lozenge Oral <User Schedule>  dextrose 10% Bolus 125 milliLiter(s) IV Bolus once  dextrose 5%. 1000 milliLiter(s) (50 mL/Hr) IV Continuous <Continuous>  dextrose 5%. 1000 milliLiter(s) (100 mL/Hr) IV Continuous <Continuous>  dextrose 50% Injectable 25 Gram(s) IV Push once  dextrose 50% Injectable 12.5 Gram(s) IV Push once  doxycycline monohydrate Capsule 100 milliGRAM(s) Oral every 12 hours  enoxaparin Injectable 40 milliGRAM(s) SubCutaneous every 24 hours  glucagon  Injectable 1 milliGRAM(s) IntraMuscular once  insulin glargine Injectable (LANTUS) 10 Unit(s) SubCutaneous every morning  insulin lispro (ADMELOG) corrective regimen sliding scale   SubCutaneous three times a day before meals  insulin lispro (ADMELOG) corrective regimen sliding scale   SubCutaneous at bedtime  insulin lispro Injectable (ADMELOG) 3 Unit(s) SubCutaneous three times a day before meals  lactated ringers. 1000 milliLiter(s) (100 mL/Hr) IV Continuous <Continuous>  lisinopril 10 milliGRAM(s) Oral daily  methylPREDNISolone sodium succinate Injectable 40 milliGRAM(s) IV Push two times a day  metoprolol succinate ER 25 milliGRAM(s) Oral daily    MEDICATIONS  (PRN):  acetaminophen     Tablet .. 650 milliGRAM(s) Oral every 6 hours PRN Temp greater or equal to 38C (100.4F), Mild Pain (1 - 3)  aluminum hydroxide/magnesium hydroxide/simethicone Suspension 30 milliLiter(s) Oral every 4 hours PRN Dyspepsia  benzonatate 100 milliGRAM(s) Oral every 8 hours PRN Cough  dextrose Oral Gel 15 Gram(s) Oral once PRN Blood Glucose LESS THAN 70 milliGRAM(s)/deciliter  guaiFENesin Oral Liquid (Sugar-Free) 100 milliGRAM(s) Oral every 6 hours PRN Cough  melatonin 3 milliGRAM(s) Oral at bedtime PRN Insomnia  ondansetron Injectable 4 milliGRAM(s) IV Push every 8 hours PRN Nausea and/or Vomiting      FAMILY HISTORY:  No pertinent family history in first degree relatives        SOCIAL HISTORY:  ***    REVIEW OF SYSTEM:  Pertinent items are noted in HPI.      Vital Signs Last 24 Hrs  T(C): 36.6 (29 May 2024 07:20), Max: 36.6 (29 May 2024 07:20)  T(F): 97.9 (29 May 2024 07:20), Max: 97.9 (29 May 2024 07:20)  HR: 92 (29 May 2024 07:20) (90 - 106)  BP: 128/76 (29 May 2024 07:20) (120/64 - 132/65)  BP(mean): 79 (28 May 2024 12:45) (79 - 79)  RR: 18 (29 May 2024 07:20) (18 - 18)  SpO2: 94% (29 May 2024 07:20) (91% - 95%)    Parameters below as of 29 May 2024 07:20  Patient On (Oxygen Delivery Method): nasal cannula  O2 Flow (L/min): 5      PHYSICAL EXAM  General Appearance: cooperative, no acute distress,   HEENT: PERRL, conjunctiva clear, EOM's intact, non injected pharynx, no exudate, TM   normal  Neck: Supple, , no adenopathy, thyroid: not enlarged, no carotid bruit or JVD  Back: Symmetric, no  tenderness,no soft tissue tenderness  Lungs: Clear to auscultation bilateral,no adventitious breath sounds, normal   expiratory phase  Heart: Regular rate and rhythm, S1, S2 normal, no murmur, rub or gallop  Abdomen: Soft, non-tender, bowel sounds active , no hepatosplenomegaly  Extremities: no cyanosis or edema, no joint swelling  Skin: Skin color, texture normal, no rashes   Neurologic: Alert and oriented X3 , cranial nerves intact, sensory and motor normal,    ECG:    LABS:                          9.4    6.29  )-----------( 236      ( 24 May 2024 06:41 )             27.7     05-24    135  |  103  |  12  ----------------------------<  242<H>  4.2   |  24  |  0.69    Ca    8.6      24 May 2024 06:41  Phos  3.4     05-24  Mg     1.8     05-24    TPro  6.3  /  Alb  2.1<L>  /  TBili  0.5  /  DBili  x   /  AST  18  /  ALT  26  /  AlkPhos  97  05-24              Urinalysis Basic - ( 24 May 2024 06:41 )    Color: x / Appearance: x / SG: x / pH: x  Gluc: 242 mg/dL / Ketone: x  / Bili: x / Urobili: x   Blood: x / Protein: x / Nitrite: x   Leuk Esterase: x / RBC: x / WBC x   Sq Epi: x / Non Sq Epi: x / Bacteria: x            RADIOLOGY & ADDITIONAL STUDIES:  < from: CT Angio Chest PE Protocol w/ IV Cont (05.23.24 @ 11:34) >  IMPRESSION:    No pulmonary embolism through the level of the segmental pulmonary   arteries. Evaluation of some subsegmental pulmonary arteries is limited.    Left lower lobe nodular opacity likely known lung cancer. Left parahilar   radiation changes.    Nonspecific diffuse groundglass throughout the right lung, differential   for which includes pneumonitis or infection.    < end of copied text >    Unchanged right greater than left groundglass opacities, which are   nonspecific, at least some of which may be related to radiation therapy.   Differential diagnostic considerations also but is not limited to include   superimposed inflammatory etiologies.    Unchanged nodular opacities including a dominant 2.6 cm left lower lobe   nodular opacity which may correspond to the patient's history of lung   cancer.    Comparison with prior older chest CTs are recommended for complete   evaluation of the above findings and if images are made available, an   addendum can be issued.    Minimal left pleural fluid with interval decrease in size since May 23,   2024.

## 2024-05-29 NOTE — PROGRESS NOTE ADULT - ASSESSMENT
79M with PMH of Lung Ca s/p chemoradiation (last dose of both as per patient in April 2024) and HLD presents with SOB/Cough/Fevers and chills for 2-3 days. onset of cough and MCDONALD 2-3 days prior to admission and since then it has been progressively worsening. Started to have chills 1-2 days prior to admission and these symptoms remains persistent and getting worse. Denies syncope, chest pain, nausea, vomiting, abdominal pain/discomfort. Diarrhea reports (1 episode daily) and non-bloody. Cough productive with yellow and white sputum production. Denies having diagnosis of COPD. Former tobacco use quit many years ago.     In the ER Tmax 100.4F, -108, BP 14/83, RR 16-28, SpO2 85% on RA. WBC 9.49, Hgb 11.1, Na 134 otherwise CMP grossly unremarkable. Lactate 1.9, ProBNP 657, Troponin 116.63->149.07.     < from: CT Angio Chest PE Protocol w/ IV Cont (05.23.24 @ 11:34) >  IMPRESSION:    No pulmonary embolism through the level of the segmental pulmonary   arteries. Evaluation of some subsegmental pulmonary arteries is limited.    Left lower lobe nodular opacity likely known lung cancer. Left parahilar   radiation changes.    Nonspecific diffuse groundglass throughout the right lung, differential   for which includes pneumonitis or infection.    repeat ct chest 5/28 noted      Unchanged right greater than left groundglass opacities, which are   nonspecific, at least some of which may be related to radiation therapy.   Differential diagnostic considerations also but is not limited to include   superimposed inflammatory etiologies.    Unchanged nodular opacities including a dominant 2.6 cm left lower lobe   nodular opacity which may correspond to the patient's history of lung   cancer.    Comparison with prior older chest CTs are recommended for complete   evaluation of the above findings and if images are made available, an   addendum can be issued.    Minimal left pleural fluid with interval decrease in size since May 23,   2024.    Assessment / plan;  Lung ca s/p chemo RT  suspected pneumonitis  no definite consolidation / pneumonia  adequate oxygenation but hypoxemia on exertion  No hemoptysis or discolored sputum  case discussed with Dr Elder  complete course of antibiotics  slow steroid taper  complete course of steroids and antibiotics  walk test for re eval hypoxemia on exertion  mobilize OOb and re eval oxygenation on exertion  will fu with you

## 2024-05-29 NOTE — PROGRESS NOTE ADULT - ASSESSMENT
MEDICATIONS  (STANDING):  albuterol    0.083% 2.5 milliGRAM(s) Nebulizer every 6 hours  albuterol    90 MICROgram(s) HFA Inhaler 1 Puff(s) Inhalation every 4 hours  aspirin enteric coated 81 milliGRAM(s) Oral daily  atorvastatin 40 milliGRAM(s) Oral at bedtime  budesonide 160 MICROgram(s)/formoterol 4.5 MICROgram(s) Inhaler 2 Puff(s) Inhalation two times a day  cefepime  Injectable. 2000 milliGRAM(s) IV Push every 8 hours  cefepime  Injectable.      clotrimazole Lozenge 1 Lozenge Oral <User Schedule>  dextrose 10% Bolus 125 milliLiter(s) IV Bolus once  dextrose 5%. 1000 milliLiter(s) (50 mL/Hr) IV Continuous <Continuous>  dextrose 5%. 1000 milliLiter(s) (100 mL/Hr) IV Continuous <Continuous>  dextrose 50% Injectable 25 Gram(s) IV Push once  dextrose 50% Injectable 12.5 Gram(s) IV Push once  doxycycline monohydrate Capsule 100 milliGRAM(s) Oral every 12 hours  enoxaparin Injectable 40 milliGRAM(s) SubCutaneous every 24 hours  glucagon  Injectable 1 milliGRAM(s) IntraMuscular once  insulin lispro (ADMELOG) corrective regimen sliding scale   SubCutaneous at bedtime  insulin lispro (ADMELOG) corrective regimen sliding scale   SubCutaneous three times a day before meals  lisinopril 10 milliGRAM(s) Oral daily  methylPREDNISolone sodium succinate Injectable 40 milliGRAM(s) IV Push daily  metoprolol succinate ER 25 milliGRAM(s) Oral daily  tiotropium 2.5 MICROgram(s) Inhaler 2 Puff(s) Inhalation daily    MEDICATIONS  (PRN):  acetaminophen     Tablet .. 650 milliGRAM(s) Oral every 6 hours PRN Temp greater or equal to 38C (100.4F), Mild Pain (1 - 3)  aluminum hydroxide/magnesium hydroxide/simethicone Suspension 30 milliLiter(s) Oral every 4 hours PRN Dyspepsia  benzonatate 100 milliGRAM(s) Oral every 8 hours PRN Cough  dextrose Oral Gel 15 Gram(s) Oral once PRN Blood Glucose LESS THAN 70 milliGRAM(s)/deciliter  guaiFENesin Oral Liquid (Sugar-Free) 100 milliGRAM(s) Oral every 6 hours PRN Cough  melatonin 3 milliGRAM(s) Oral at bedtime PRN Insomnia  ondansetron Injectable 4 milliGRAM(s) IV Push every 8 hours PRN Nausea and/or Vomiting          A/P    Severe Sepsis Secondary to Pneumonia in Immunocompromised Lung Cancer Patient  Acute bronchitis (likely with underlying COPD but denies ever having diagnosis of COPD)  Acute Respiratory Failure with Hypoxia due to above + Likely component of radiation pneumonitis.  Associated elevated troponin secondary to supply/demand ischemia due to above  Lung cancer s/p chemoradiation (last dose reported april 2024).   -Troponins elevated in 100s without significant rise .Telemetry x 24 hours->no events->DC tele  - Cardiology consult.   -CXRAY and CT noted  -Empiric anitbiotics  -TTE unremarkable  -EKG NSR without ST changes.   -Lactate WNL  -Cultures NGTD  -O2 supplementation  -Albuterol/Symbicort  -IV Solumedrol. Down titrate as tolerated  -Repeat CXRAY (5/27) with vascular congestion (likely fluid retention from steroids). IV Lasix 20mg x 1 ordered on 5/28. Continue to monitor.   -Hem/Onc consult/recs  -Repeat CT findings noted. Additional dose of lasix ordered on 5/29. Continue to monitor.     HLD  -Continue statin    Prediabetes with Hyperglycemia due to steroids  -Augmented by administration of solumedrol  -A1c 6.4%  -BGM/ISS/Lantus. Titrate accordingly. With solumedrol decrease on 5/28. DC premeal insulin and decreased lantus. Continue to titrate accordingly    DVT Prophylaxis: Lovenox subq  Disposition: Still medically active.  Pending improvement in hypoxia. Walking O2 as O2 saturations improved

## 2024-05-30 NOTE — PROGRESS NOTE ADULT - ASSESSMENT
MEDICATIONS  (STANDING):  albuterol    0.083% 2.5 milliGRAM(s) Nebulizer every 6 hours  albuterol    90 MICROgram(s) HFA Inhaler 1 Puff(s) Inhalation every 4 hours  aspirin enteric coated 81 milliGRAM(s) Oral daily  atorvastatin 40 milliGRAM(s) Oral at bedtime  budesonide 160 MICROgram(s)/formoterol 4.5 MICROgram(s) Inhaler 2 Puff(s) Inhalation two times a day  clotrimazole Lozenge 1 Lozenge Oral <User Schedule>  dextrose 10% Bolus 125 milliLiter(s) IV Bolus once  dextrose 5%. 1000 milliLiter(s) (50 mL/Hr) IV Continuous <Continuous>  dextrose 5%. 1000 milliLiter(s) (100 mL/Hr) IV Continuous <Continuous>  dextrose 50% Injectable 25 Gram(s) IV Push once  dextrose 50% Injectable 12.5 Gram(s) IV Push once  enoxaparin Injectable 40 milliGRAM(s) SubCutaneous every 24 hours  glucagon  Injectable 1 milliGRAM(s) IntraMuscular once  insulin glargine Injectable (LANTUS) 5 Unit(s) SubCutaneous every morning  insulin lispro (ADMELOG) corrective regimen sliding scale   SubCutaneous three times a day before meals  insulin lispro (ADMELOG) corrective regimen sliding scale   SubCutaneous at bedtime  insulin lispro Injectable (ADMELOG) 2 Unit(s) SubCutaneous three times a day before meals  lisinopril 10 milliGRAM(s) Oral daily  methylPREDNISolone sodium succinate Injectable 40 milliGRAM(s) IV Push every 6 hours  metoprolol succinate ER 25 milliGRAM(s) Oral daily  tiotropium 2.5 MICROgram(s) Inhaler 2 Puff(s) Inhalation daily    MEDICATIONS  (PRN):  acetaminophen     Tablet .. 650 milliGRAM(s) Oral every 6 hours PRN Temp greater or equal to 38C (100.4F), Mild Pain (1 - 3)  aluminum hydroxide/magnesium hydroxide/simethicone Suspension 30 milliLiter(s) Oral every 4 hours PRN Dyspepsia  benzonatate 100 milliGRAM(s) Oral every 8 hours PRN Cough  dextrose Oral Gel 15 Gram(s) Oral once PRN Blood Glucose LESS THAN 70 milliGRAM(s)/deciliter  guaiFENesin Oral Liquid (Sugar-Free) 100 milliGRAM(s) Oral every 6 hours PRN Cough  melatonin 3 milliGRAM(s) Oral at bedtime PRN Insomnia  ondansetron Injectable 4 milliGRAM(s) IV Push every 8 hours PRN Nausea and/or Vomiting          A/P    Severe Sepsis Secondary to Pneumonia in Immunocompromised Lung Cancer Patient  Acute bronchitis (likely with underlying COPD but denies ever having diagnosis of COPD)  Acute Respiratory Failure with Hypoxia due to above + Likely component of radiation pneumonitis.  Associated elevated troponin secondary to supply/demand ischemia due to above  Lung cancer s/p chemoradiation (last dose reported april 2024).   -Troponins elevated in 100s without significant rise .Telemetry x 24 hours->no events->DC tele  - Cardiology consult.   -CXRAY and CT noted  -Empiric anitbiotics. Completed Cefepime/Doxy  -TTE unremarkable  -EKG NSR without ST changes.   -Lactate WNL  -Cultures NGTD  -O2 supplementation  -Albuterol/Symbicort  -IV Solumedrol. Down titrate as tolerated  -Repeat CXRAY (5/27) with vascular congestion (likely fluid retention from steroids). IV Lasix 20mg x 1 ordered on 5/28. Continue to monitor.   -Hem/Onc consult/recs  -Repeat CT findings noted. Additional dose of lasix ordered on 5/29. Continue to monitor->subsequently hyponatremia. hold any further diuresis at this time  -5/28: no real change in o2 requirements despite antibiotics, steroids, diuresis. No PE on admission CTA. Large A-a gradient on ABG (5/29). 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 (titrate insulin accordingly with anticipated hyperglycemia augmented by steroids).     HLD  -Continue statin    Prediabetes with Hyperglycemia due to steroids  -Augmented by administration of solumedrol  -A1c 6.4%  -BGM/ISS/Lantus. Titrate accordingly.     DVT Prophylaxis: Lovenox subq  Disposition: Still medically active.  Pending improvement in hypoxia.     I spent a total of 52 minutes in face-to-face time with the patient and on the floor managing patient including coordination of care. Overt 50% of the time spent in discussion of the diagonosis, counseling and treatment plan.

## 2024-05-30 NOTE — CONSULT NOTE ADULT - CONVERSATION DETAILS
The role of Palliative medicne was reviewed with the pt. He states he is feeling " a little" better than when he arrived to the hospital. His dyspnea is improved although increases on exertion. States he may need home O2. he was unable to state wether or not he will be receiving further cancer treatment at this time. WARREN discussed and he chooses not to set any limits. Plans to return home with wife and supportive daughters nearby.

## 2024-05-30 NOTE — CONSULT NOTE ADULT - SUBJECTIVE AND OBJECTIVE BOX
79M with PMH of Lung Ca s/p chemoradiation (last dose of both as per patient in April 2024) and HLD presents with SOB/Cough/Fevers and chills for 2-3 days. onset of cough and CMDONALD 2-3 days prior to admission and since then it has been progressively worsening. Started to have chills 1-2 days prior to admission and these symptoms remains persistent and getting worse. Denies syncope, chest pain, nausea, vomiting, abdominal pain/discomfort. Diarrhea reports (1 episode daily) and non-bloody. Cough productive with yellow and white sputum production. Denies having diagnosis of COPD. Former tobacco use quit many years ago. Primary Vietnamese speaking. ID below  5/30/24 Seen and examined at bedside. Seated in chair with no C/O pain or dyspnea at rest. Endorses dyspnea on exertion.  ID [id number].    PAIN: ( )Yes   (X )No    DYSPNEA: (X ) Yes  ( ) No  On exertion    PAST MEDICAL & SURGICAL HISTORY:  HLD (hyperlipidemia)  Hyperlipemia  Lung cancer  No significant past surgical history    SOCIAL HX:  Lives home with wife  Hx opiate tolerance ( )YES  (X )NO    Baseline ADLs  (Prior to Admission)  ( ) Independent   ( )Dependent    FAMILY HISTORY:  No pertinent family history in first degree relatives        Review of Systems:  Physical Discomfort-mild  Dyspnea-on exertion  Anorexia-mild  Fatigue-mod-severe  Weakness-mod    All other systems reviewed and negative      PHYSICAL EXAM:    Vital Signs Last 24 Hrs  T(C): 36.6 (30 May 2024 07:33), Max: 36.6 (30 May 2024 07:33)  T(F): 97.9 (30 May 2024 07:33), Max: 97.9 (30 May 2024 07:33)  HR: 87 (30 May 2024 07:33) (84 - 91)  BP: 116/78 (30 May 2024 07:33) (116/78 - 123/65)  RR: 18 (30 May 2024 07:33) (18 - 18)  SpO2: 95% (30 May 2024 07:33) (95% - 97%)    Parameters below as of 30 May 2024 07:33  Patient On (Oxygen Delivery Method): nasal cannula    PPSV2: 50  %  General: Elderly male in chair in NAD  Mental Status: A&O X3  HEENT: oral mucosa moist/nasal o2  Lungs: clear ollie to auscultation  Cardiac: S1S2+  GI: abd soft Nt ND + BS  : voids  Ext: BOCANEGRA=strength  Neuro: no focal def      LABS:    05-30    130<L>  |  97  |  33<H>  ----------------------------<  107<H>  4.1   |  26  |  0.63    Ca    8.8      30 May 2024 07:16  Phos  3.1     05-30  Mg     1.8     05-30        Albumin: Albumin: 2.6 g/dL (05-28 @ 07:24)      Allergies    No Known Allergies    Intolerances      MEDICATIONS  (STANDING):  albuterol    0.083% 2.5 milliGRAM(s) Nebulizer every 6 hours  albuterol    90 MICROgram(s) HFA Inhaler 1 Puff(s) Inhalation every 4 hours  aspirin enteric coated 81 milliGRAM(s) Oral daily  atorvastatin 40 milliGRAM(s) Oral at bedtime  budesonide 160 MICROgram(s)/formoterol 4.5 MICROgram(s) Inhaler 2 Puff(s) Inhalation two times a day  clotrimazole Lozenge 1 Lozenge Oral <User Schedule>  dextrose 10% Bolus 125 milliLiter(s) IV Bolus once  dextrose 5%. 1000 milliLiter(s) (50 mL/Hr) IV Continuous <Continuous>  dextrose 5%. 1000 milliLiter(s) (100 mL/Hr) IV Continuous <Continuous>  dextrose 50% Injectable 25 Gram(s) IV Push once  dextrose 50% Injectable 12.5 Gram(s) IV Push once  enoxaparin Injectable 40 milliGRAM(s) SubCutaneous every 24 hours  glucagon  Injectable 1 milliGRAM(s) IntraMuscular once  insulin glargine Injectable (LANTUS) 5 Unit(s) SubCutaneous every morning  insulin lispro (ADMELOG) corrective regimen sliding scale   SubCutaneous three times a day before meals  insulin lispro (ADMELOG) corrective regimen sliding scale   SubCutaneous at bedtime  insulin lispro Injectable (ADMELOG) 2 Unit(s) SubCutaneous three times a day before meals  lisinopril 10 milliGRAM(s) Oral daily  methylPREDNISolone sodium succinate Injectable 40 milliGRAM(s) IV Push every 6 hours  metoprolol succinate ER 25 milliGRAM(s) Oral daily  tiotropium 2.5 MICROgram(s) Inhaler 2 Puff(s) Inhalation daily    MEDICATIONS  (PRN):  acetaminophen     Tablet .. 650 milliGRAM(s) Oral every 6 hours PRN Temp greater or equal to 38C (100.4F), Mild Pain (1 - 3)  aluminum hydroxide/magnesium hydroxide/simethicone Suspension 30 milliLiter(s) Oral every 4 hours PRN Dyspepsia  benzonatate 100 milliGRAM(s) Oral every 8 hours PRN Cough  dextrose Oral Gel 15 Gram(s) Oral once PRN Blood Glucose LESS THAN 70 milliGRAM(s)/deciliter  guaiFENesin Oral Liquid (Sugar-Free) 100 milliGRAM(s) Oral every 6 hours PRN Cough  melatonin 3 milliGRAM(s) Oral at bedtime PRN Insomnia  ondansetron Injectable 4 milliGRAM(s) IV Push every 8 hours PRN Nausea and/or Vomiting      RADIOLOGY/ADDITIONAL STUDIES:    < from: CT Chest No Cont (05.28.24 @ 15:45) >    ACC: [id number] EXAM:  CT CHEST   ORDERED BY: ASTER GEIGER     PROCEDURE DATE:  05/28/2024          INTERPRETATION:  CLINICAL INFORMATION: Hypoxia. Reevaluation of   pneumonia/pneumonitis    COMPARISON: CTA chest 5/23/2024    CONTRAST/COMPLICATIONS:  IV Contrast: NONE  Oral Contrast: NONE  Complications: None reported at time of study completion    PROCEDURE:  CT of the Chest was performed.  Sagittal and coronal reformats were performed.    FINDINGS:    LUNGS AND AIRWAYS: Patent central airways.  Emphysema. Ill-defined   previously described 2.6 cm nodular opacity in the left lower lobe is   unchanged may correspond to the patient's given history of lung cancer.   Groundglass opacities in the central portions of the left upper and left   lowerlobes are unchanged from 5/23/2024. No significant change in   diffuse right greater than left groundglass opacities with a mid to lower   lung predominance. Few small nodules, for reference a 6 mm nodule in the   left upper lobe (2, 56) and a 5 mm nodule in the right upper lobe (2, 70)   are unchanged. Multiple chronic cysts in the dependent portions of the   lower lobes, larger on the right.  PLEURA: Decrease in size of now trace left pleural effusion.  MEDIASTINUM AND EFREN: Unchanged mildly prominent mediastinal   lymphadenopathy. For reference, 1.3 cm subcarinal lymph node previously   measured 1.3 cm.  VESSELS: Coronary artery and aortic calcifications.Mild enlargement of   the aortic root at the sinus of Valsalva measuring up to 4.2 cm as   correlated with prior contrast enhanced CT. The ascending aorta measures   up to 3.0 cm, and the descending thoracic aorta measuring up to 2.7 cm   when correlated with prior contrast enhanced CT.  HEART: Cardiomegaly. Trace pericardial fluid.  CHEST WALL AND LOWER NECK: Within normal limits.  VISUALIZED UPPER ABDOMEN: Partially imaged left renal cyst.  BONES: Degenerative changes.    IMPRESSION:    Unchanged right greater than left groundglass opacities, which are   nonspecific, at least some of which may be related to radiation therapy.   Differential diagnostic considerations also but is not limited to include   superimposed inflammatory etiologies.    Unchanged nodular opacities including a dominant 2.6 cm left lower lobe   nodular opacity which may correspond to the patient's history of lung   cancer.    Comparison with prior older chest CTs are recommended for complete   evaluation of the above findings and if images are made available, an   addendum can be issued.    Minimal left pleural fluid with interval decrease in size since May 23,   2024.    < from: Xray Chest 2 Views PA/Lat (05.27.24 @ 09:31) >    ACC: [id number] EXAM:  XR CHEST PA LAT 2V   ORDERED BY: ASTER GEIGER     PROCEDURE DATE:  05/27/2024          INTERPRETATION:  PA and lateral chest radiographs    COMPARISON: CT chest 5/23/2004 and chest x-ray 5/23/2004.  CLINICAL INFORMATION: Hypoxicrespiratory failure.. Follow-up    FINDINGS:  CATHETERS AND TUBES: None    PULMONARY: Pulmonary vascular congestion with bilateral perihilar/LEFT   lower zone interstitial/alveolar airspace disease. No pleural effusion or   pneumothorax.    HEART/VASCULAR: The  heart is enlarged in transverse diameter. .    BONES: The visualized osseous thorax is intact.    IMPRESSION:  No significant change.  Pulmonary vascular congestion with bilateral perihilar/LEFT lower zone   interstitial/alveolar airspace disease    < from: CT Angio Chest PE Protocol w/ IV Cont (05.23.24 @ 11:34) >    ACC: [id number] EXAM:  CT ANGIO CHEST PULM ART WAWIC   ORDERED BY: LINDA ARGUETA     PROCEDURE DATE:  05/23/2024          INTERPRETATION:  INDICATION: Shortness of breath. 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 Completed multiple   cycles of chemotherapy, currently on keytruda and is currently undergoing   radiotherapy to the left lung.    TECHNIQUE: Helical acquisition of the chest after the administration of   70 mL of Omnipaque 350. Maximum intensity projection images were   generated.    COMPARISON: None.    FINDINGS:    HEART/VASCULATURE: No pulmonary embolism to the level of the segmental   arteries bilaterally. Evaluation of some subsegmental arteries is limited   due respiratory motion. Enlarged right ventricle relative to the left   ventricle. No pericardial effusion.    LUNGS/AIRWAYS/PLEURA: Central airways are patent. Emphysema. Well   marginated opacities in the central portion of the left upper and lower   lobes. Central left lower lobe 2.6cm nodular opacity (2-66). Diffuse   groundglass in the right upper, middle, and lower lobes. Few small lung   nodules, for example, 6 mm in the left upper lobe, (2:53), and 5 mm in   the right upper lobe (2:70). Left upper lobe calcified granuloma. Trace   left pleural effusion.      LYMPH NODES/MEDIASTINUM: 1.3 cm subcarinal lymph node. Few other   intrathoracic lymph nodes up to 1 cm. Subcentimeter left supraclavicular   lymph node.    UPPER ABDOMEN: 3.0 cm midpole left renal cyst. No acute intra-abdominal   findings of the partially visualized upper abdomen.    BONES/SOFT TISSUES: Degenerative changes. No aggressive osseous lesions.      IMPRESSION:    No pulmonary embolism through the level of the segmental pulmonary   arteries. Evaluation of some subsegmental pulmonary arteries is limited.    Left lower lobe nodular opacity likely known lung cancer. Left parahilar   radiation changes.    Nonspecific diffuse groundglass throughout the right lung, differential   for which includes pneumonitis or infection.      < end of copied text >

## 2024-05-30 NOTE — PROGRESS NOTE ADULT - ASSESSMENT
79M with PMH of Lung Ca s/p chemoradiation (last dose of both as per patient in April 2024) and HLD presents with SOB/Cough/Fevers and chills for 2-3 days. onset of cough and MCDONALD 2-3 days prior to admission and since then it has been progressively worsening. Started to have chills 1-2 days prior to admission and these symptoms remains persistent and getting worse. Denies syncope, chest pain, nausea, vomiting, abdominal pain/discomfort. Diarrhea reports (1 episode daily) and non-bloody. Cough productive with yellow and white sputum production. Denies having diagnosis of COPD. Former tobacco use quit many years ago.     In the ER Tmax 100.4F, -108, BP 14/83, RR 16-28, SpO2 85% on RA. WBC 9.49, Hgb 11.1, Na 134 otherwise CMP grossly unremarkable. Lactate 1.9, ProBNP 657, Troponin 116.63->149.07.     < from: CT Angio Chest PE Protocol w/ IV Cont (05.23.24 @ 11:34) >  IMPRESSION:    No pulmonary embolism through the level of the segmental pulmonary   arteries. Evaluation of some subsegmental pulmonary arteries is limited.    Left lower lobe nodular opacity likely known lung cancer. Left parahilar   radiation changes.    Nonspecific diffuse groundglass throughout the right lung, differential   for which includes pneumonitis or infection.    repeat ct chest 5/28 noted      Unchanged right greater than left groundglass opacities, which are   nonspecific, at least some of which may be related to radiation therapy.   Differential diagnostic considerations also but is not limited to include   superimposed inflammatory etiologies.    Unchanged nodular opacities including a dominant 2.6 cm left lower lobe   nodular opacity which may correspond to the patient's history of lung   cancer.    Comparison with prior older chest CTs are recommended for complete   evaluation of the above findings and if images are made available, an   addendum can be issued.    Minimal left pleural fluid with interval decrease in size since May 23,   2024.    Assessment / plan;  Lung ca s/p chemo RT  suspected pneumonitis  no definite consolidation / pneumonia  adequate oxygenation but hypoxemia on exertion  No hemoptysis or discolored sputum  case discussed with Dr Elder  complete course of antibiotics  slow steroid taper  complete course of steroids and antibiotics  walk test for re eval hypoxemia on exertion  mobilize OOb and re eval oxygenation on exertion  ABG - ( 29 May 2024 14:51 )  pH, Arterial: 7.43  pH, Blood: x     /  pCO2: 36    /  pO2: 83    / HCO3: 24    / Base Excess: -0.1  /  SaO2: 98        Large A-a gradient despite antibiotics and steroids  etiology likley pneumonitis with persistent infiltrates and basilar crackles on exam  will need home o2 set up  switch to po ceftin /lower dose of cefepime IV  Doubt PE with negative CTA and alternate dx available with pneumonitis seen on ct scan  concern wit persitent infiltrates despite treatment  not a good candidate for lung bx  conservative measures appropriate  discussed with hospitalist  all recent films personally reviewed

## 2024-05-30 NOTE — PROGRESS NOTE ADULT - ASSESSMENT
Stage III lung cancer  S/P neoadjuvant Immune therapy + Chemotherapy with a good response  S/P Taxol/ Carb + RT  Now possible pneumonia/pneumonitis  Anemia   Elevated Troponin         PLAN    Antibiotics/ steroids as per Pulm / medicine- continuing steroids for possible element of radiation pneumonitis  No evidence of progressive cancer  Procrit for progressive anemia however not needed today    Thank you for the courtesy of this consultation and we will continue to follow.    Thony Mroris MD  [facility]  Cell: [phone]

## 2024-05-30 NOTE — PROGRESS NOTE ADULT - SUBJECTIVE AND OBJECTIVE BOX
CHIEF COMPLAINT: Cough/SOB/Fevers    SUBJECTIVE/SIGNIFICANT INTERVAL EVENTS/OVERNIGHT EVENTS:    5/24: Fever overnight x 1. Now afebrile and feeling better. Cough and SOB improved. No events on telemetry. DC telemetry. Tolerating antibiotics. Denies chest pain, nausea, vomiting, abdominal pain/discomfort, dysuria.    5/25:  Cough  and SOB improved but feeling weak and tired. Afebrile. Denies fever, chills, chest pain, nausea, vomiting.  Still hypoxic 84% on RA at rest. Continue O2 supplementation and downtitrate as tolerated     5/26: feeling better. Cough near resolved. Still with significant hypoxia on exertion. GLuc improved. Continue antibiotics and steroids.     5/27: Interval improvement. Stilll on 3LC NC down from 5LNC. Glucose controlled. Continue antibiotics and steroids.     5/28: Still on 5L NC at rest. 4L with SpO2 88-90%. CXRAY with some vascular congestion. TTE unremarkable. No more wheezing. Decrease steroids to qd. IV Lasix 20mg x 1 today. Continue to assess. Repeat CT chest ordered for re-evaluation because despite antibiotics and steroids, no real change in O2 supplementation requirement. Initial CTA negative for PE and has been on DVT prophylaxis: Fevers have resolved.     5/29: SOB and breathing reported with mild interval improvement from yesterday. Continue solumedrol daily. Additiona dose of IV lasix administered with reported improved pleural effusion on repeat CT. Other findings on CT unchanged from admission? Glucose lower with solumedrol down titration. DC lantus and continue BGM/ISS.     5/30: Afebrile and vitals stable. Discontinue antibiotics. ALthough symptomatically improving, Imaging and O2 requirement similar/slightly better than that on admission. Reviewed images with Radiologist and discussed with pulm. 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 Incentive spirometer and acapella ordered as well. Contineu to monitor closely.     Review of Systems: 14 Point review of systems reviewed and reported as negative unless otherwise stated above    FROM H&P:  "79M with PMH of Lung Ca s/p chemoradiation (last dose of both as per patient in April 2024) and HLD presents with SOB/Cough/Fevers and chills for 2-3 days. onset of cough and MCDONALD 2-3 days prior to admission and since then it has been progressively worsening. Started to have chills 1-2 days prior to admission and these symptoms remains persistent and getting worse. Denies syncope, chest pain, nausea, vomiting, abdominal pain/discomfort. Diarrhea reports (1 episode daily) and non-bloody. Cough productive with yellow and white sputum production. Denies having diagnosis of COPD. Former tobacco use quit many years ago.     In the ER Tmax 100.4F, -108, BP 14/83, RR 16-28, SpO2 85% on RA. WBC 9.49, Hgb 11.1, Na 134 otherwise CMP grossly unremarkable. Lactate 1.9, ProBNP 657, Troponin 116.63->149.07."    PHYSICAL EXAM:    T(C): 36.6 (05-30-24 @ 07:33), Max: 36.6 (05-30-24 @ 07:33)  HR: 87 (05-30-24 @ 07:33) (84 - 91)  BP: 116/78 (05-30-24 @ 07:33) (116/78 - 123/65)  RR: 18 (05-30-24 @ 07:33) (18 - 18)  SpO2: 95% (05-30-24 @ 07:33) (95% - 97%)  General: AAOx3; NAD: Frail  ENT: Moist Mucous Membranes; No Injury  Neck: Non-tender; No JVD  CVS: RRR, S1&S2, No murmur, No edema  Respiratory: Decreased basilar breath sounds R>L; ; Interval improvement Normal Respiratory Effort; respiratory support with NC  Abdomen/GI: Soft, non-tender, non-distended, no guarding, no rebound, normal bowel sounds  Extremities: No cyanosis, No clubbing, No edema  Neuro: AAOx3, CNII-XII grossly intact, non-focal  Skin: Clean, Dry and Intact      LABS:               05-30    130<L>  |  97  |  33<H>  ----------------------------<  107<H>  4.1   |  26  |  0.63    Ca    8.8      30 May 2024 07:16  Phos  3.1     05-30  Mg     1.8     05-30      SARS-CoV-2: NotDetec (23 May 2024 09:41)    CAPILLARY BLOOD GLUCOSE      POCT Blood Glucose.: 109 mg/dL (30 May 2024 07:38)  POCT Blood Glucose.: 143 mg/dL (29 May 2024 22:22)  POCT Blood Glucose.: 176 mg/dL (29 May 2024 16:46)  POCT Blood Glucose.: 209 mg/dL (29 May 2024 12:09)                   11.6   12.67 )-----------( 289      ( 28 May 2024 07:24 )             35.2     05-28    135  |  101  |  25<H>  ----------------------------<  182<H>  4.9   |  28  |  0.64    Ca    9.1      28 May 2024 07:24  Phos  4.4     05-28    TPro  6.6  /  Alb  2.6<L>  /  TBili  0.8  /  DBili  x   /  AST  27  /  ALT  68  /  AlkPhos  107  05-28    SARS-CoV-2: [facility] (23 May 2024 09:41)    CAPILLARY BLOOD GLUCOSE      POCT Blood Glucose.: 262 mg/dL (28 May 2024 12:14)  POCT Blood Glucose.: 169 mg/dL (28 May 2024 08:16)  POCT Blood Glucose.: 86 mg/dL (27 May 2024 21:17)  POCT Blood Glucose.: 154 mg/dL (27 May 2024 16:54)                            9.4    6.29  )-----------( 236      ( 24 May 2024 06:41 )             27.7     05-24    135  |  103  |  12  ----------------------------<  242<H>  4.2   |  24  |  0.69    Ca    8.6      24 May 2024 06:41  Phos  3.4     05-24  Mg     1.8     05-24    TPro  6.3  /  Alb  2.1<L>  /  TBili  0.5  /  DBili  x   /  AST  18  /  ALT  26  /  AlkPhos  97  05-24    SARS-CoV-2: [facility] (23 May 2024 09:41)    CAPILLARY BLOOD GLUCOSE    Troponin I, High Sensitivity (05.23.24 @ 16:16)   Troponin I, High Sensitivity Result: 123.38: Troponin I, High Sensitivity (05.23.24 @ 12:47)   Troponin I, High Sensitivity Result: 149.07:Troponin I, High Sensitivity (05.23.24 @ 09:41)   Troponin I, High Sensitivity Result: 116.63: Respiratory Viral Panel with COVID-19 by NATALEE (05.23.24 @ 09:41)   Rapid RVP Result: [facility]  SARS-CoV-2: NotDetecPro-Brain Natriuretic Peptide: 657 pg/mL (05.23.24 @ 09:41) Lactate, Blood: 1.9 mmol/L (05.23.24 @ 09:41)           RADIOLOGY:  < from: CT Angio Chest PE Protocol w/ IV Cont (05.23.24 @ 11:34) >    IMPRESSION:    No pulmonary embolism through the level of the segmental pulmonary   arteries. Evaluation of some subsegmental pulmonary arteries is limited.    Left lower lobe nodular opacity likely known lung cancer. Left parahilar   radiation changes.    Nonspecific diffuse groundglass throughout the right lung, differential   for which includes pneumonitis or infection.    < end of copied text >    < from: CT Chest No Cont (05.28.24 @ 15:45) >    IMPRESSION:    Unchanged right greater than left groundglass opacities, which are   nonspecific, at least some of which may be related to radiation therapy.   Differential diagnostic considerations also but is not limited to include   superimposed inflammatory etiologies.    Unchanged nodular opacities including a dominant 2.6 cm left lower lobe   nodular opacity which may correspond to the patient's history of lung   cancer.    Comparison with prior older chest CTs are recommended for complete   evaluation of the above findings and if images are made available, an   addendum can be issued.    Minimal left pleural fluid with interval decrease in size since May 23,   2024.    < end of copied text >        EKG:  < from: 12 Lead ECG (05.23.24 @ 09:48) >    Diagnosis Line Normal sinus rhythm  Normal ECG  No previous ECGs available    < end of copied text >      ECHO:  < from: TTE W or WO Ultrasound Enhancing Agent (05.24.24 @ 07:29) >  CONCLUSIONS:      1. Left ventricular cavity is normal in size. Left ventricular systolic function is normal with an ejection fraction of 52 % by 3D with an ejection fraction visually estimated at 50 to 55 %.   2. Normal right ventricular cavity size, with normal wall thickness, and normal systolic function.   3. Mild mitral regurgitation.   4. Mild aortic regurgitation.    < end of copied text >              I personally reviewed labs, imaging, ekg, orders and vitals.    Discussed case with:  [X]RN  [X]ROLANDO/GRETTA  [X]Patient  [X]Family  [X]Specialist: Pulmonology/Radiology

## 2024-05-30 NOTE — PROGRESS NOTE ADULT - SUBJECTIVE AND OBJECTIVE BOX
Patient is a 79y old  Male who presents with a chief complaint of Cough/SOB/Fevers (24 May 2024 22:41)      HPI:  79M with PMH of Lung Ca s/p chemoradiation (last dose of both as per patient in April 2024) and HLD presents with SOB/Cough/Fevers and chills for 2-3 days. onset of cough and MCDONALD 2-3 days prior to admission and since then it has been progressively worsening. Started to have chills 1-2 days prior to admission and these symptoms remains persistent and getting worse. Denies syncope, chest pain, nausea, vomiting, abdominal pain/discomfort. Diarrhea reports (1 episode daily) and non-bloody. Cough productive with yellow and white sputum production. Denies having diagnosis of COPD. Former tobacco use quit many years ago.     In the ER Tmax 100.4F, -108, BP 14/83, RR 16-28, SpO2 85% on RA. WBC 9.49, Hgb 11.1, Na 134 otherwise CMP grossly unremarkable. Lactate 1.9, ProBNP 657, Troponin 116.63->149.07.     < from: CT Angio Chest PE Protocol w/ IV Cont (05.23.24 @ 11:34) >  IMPRESSION:    No pulmonary embolism through the level of the segmental pulmonary   arteries. Evaluation of some subsegmental pulmonary arteries is limited.    Left lower lobe nodular opacity likely known lung cancer. Left parahilar   radiation changes.    Nonspecific diffuse groundglass throughout the right lung, differential   for which includes pneumonitis or infection.    < end of copied text >       (23 May 2024 16:38)      PAST MEDICAL & SURGICAL HISTORY:  HLD (hyperlipidemia)      Hyperlipemia      Lung cancer      No significant past surgical history          PREVIOUS DIAGNOSTIC TESTING:      MEDICATIONS  (STANDING):  albuterol    0.083% 2.5 milliGRAM(s) Nebulizer every 6 hours  albuterol    90 MICROgram(s) HFA Inhaler 1 Puff(s) Inhalation every 4 hours  aspirin enteric coated 81 milliGRAM(s) Oral daily  atorvastatin 40 milliGRAM(s) Oral at bedtime  budesonide 160 MICROgram(s)/formoterol 4.5 MICROgram(s) Inhaler 2 Puff(s) Inhalation two times a day  cefepime  Injectable. 2000 milliGRAM(s) IV Push every 8 hours  cefepime  Injectable.      clotrimazole Lozenge 1 Lozenge Oral <User Schedule>  dextrose 10% Bolus 125 milliLiter(s) IV Bolus once  dextrose 5%. 1000 milliLiter(s) (50 mL/Hr) IV Continuous <Continuous>  dextrose 5%. 1000 milliLiter(s) (100 mL/Hr) IV Continuous <Continuous>  dextrose 50% Injectable 25 Gram(s) IV Push once  dextrose 50% Injectable 12.5 Gram(s) IV Push once  doxycycline monohydrate Capsule 100 milliGRAM(s) Oral every 12 hours  enoxaparin Injectable 40 milliGRAM(s) SubCutaneous every 24 hours  glucagon  Injectable 1 milliGRAM(s) IntraMuscular once  insulin lispro (ADMELOG) corrective regimen sliding scale   SubCutaneous at bedtime  insulin lispro (ADMELOG) corrective regimen sliding scale   SubCutaneous three times a day before meals  lisinopril 10 milliGRAM(s) Oral daily  methylPREDNISolone sodium succinate Injectable 40 milliGRAM(s) IV Push daily  metoprolol succinate ER 25 milliGRAM(s) Oral daily  tiotropium 2.5 MICROgram(s) Inhaler 2 Puff(s) Inhalation daily    MEDICATIONS  (PRN):  acetaminophen     Tablet .. 650 milliGRAM(s) Oral every 6 hours PRN Temp greater or equal to 38C (100.4F), Mild Pain (1 - 3)  aluminum hydroxide/magnesium hydroxide/simethicone Suspension 30 milliLiter(s) Oral every 4 hours PRN Dyspepsia  benzonatate 100 milliGRAM(s) Oral every 8 hours PRN Cough  dextrose Oral Gel 15 Gram(s) Oral once PRN Blood Glucose LESS THAN 70 milliGRAM(s)/deciliter  guaiFENesin Oral Liquid (Sugar-Free) 100 milliGRAM(s) Oral every 6 hours PRN Cough  melatonin 3 milliGRAM(s) Oral at bedtime PRN Insomnia  ondansetron Injectable 4 milliGRAM(s) IV Push every 8 hours PRN Nausea and/or Vomiting      FAMILY HISTORY:  No pertinent family history in first degree relatives        Vital Signs Last 24 Hrs  T(C): 36.6 (30 May 2024 07:33), Max: 36.6 (30 May 2024 07:33)  T(F): 97.9 (30 May 2024 07:33), Max: 97.9 (30 May 2024 07:33)  HR: 87 (30 May 2024 07:33) (84 - 93)  BP: 116/78 (30 May 2024 07:33) (116/78 - 123/65)  BP(mean): --  RR: 18 (30 May 2024 07:33) (18 - 18)  SpO2: 95% (30 May 2024 07:33) (94% - 97%)    Parameters below as of 30 May 2024 07:33  Patient On (Oxygen Delivery Method): nasal cannula        PHYSICAL EXAM  General Appearance: cooperative, no acute distress,   HEENT: PERRL, conjunctiva clear, EOM's intact, non injected pharynx, no exudate, TM   normal  Neck: Supple, , no adenopathy, thyroid: not enlarged, no carotid bruit or JVD  Back: Symmetric, no  tenderness,no soft tissue tenderness  Lungs: bibasilar crackles persist, no wheeze  Heart: Regular rate and rhythm, S1, S2 normal, no murmur, rub or gallop  Abdomen: Soft, non-tender, bowel sounds active , no hepatosplenomegaly  Extremities: no cyanosis or edema, no joint swelling  Skin: Skin color, texture normal, no rashes   Neurologic: Alert and oriented X3 , cranial nerves intact, sensory and motor normal,    ECG:    LABS:                          9.4    6.29  )-----------( 236      ( 24 May 2024 06:41 )             27.7     05-24    135  |  103  |  12  ----------------------------<  242<H>  4.2   |  24  |  0.69    Ca    8.6      24 May 2024 06:41  Phos  3.4     05-24  Mg     1.8     05-24    TPro  6.3  /  Alb  2.1<L>  /  TBili  0.5  /  DBili  x   /  AST  18  /  ALT  26  /  AlkPhos  97  05-24              Urinalysis Basic - ( 24 May 2024 06:41 )    Color: x / Appearance: x / SG: x / pH: x  Gluc: 242 mg/dL / Ketone: x  / Bili: x / Urobili: x   Blood: x / Protein: x / Nitrite: x   Leuk Esterase: x / RBC: x / WBC x   Sq Epi: x / Non Sq Epi: x / Bacteria: x      ABG - ( 29 May 2024 14:51 )  pH, Arterial: 7.43  pH, Blood: x     /  pCO2: 36    /  pO2: 83    / HCO3: 24    / Base Excess: -0.1  /  SaO2: 98                    RADIOLOGY & ADDITIONAL STUDIES:  < from: CT Angio Chest PE Protocol w/ IV Cont (05.23.24 @ 11:34) >  IMPRESSION:    No pulmonary embolism through the level of the segmental pulmonary   arteries. Evaluation of some subsegmental pulmonary arteries is limited.    Left lower lobe nodular opacity likely known lung cancer. Left parahilar   radiation changes.    Nonspecific diffuse groundglass throughout the right lung, differential   for which includes pneumonitis or infection.    < end of copied text >    Unchanged right greater than left groundglass opacities, which are   nonspecific, at least some of which may be related to radiation therapy.   Differential diagnostic considerations also but is not limited to include   superimposed inflammatory etiologies.    Unchanged nodular opacities including a dominant 2.6 cm left lower lobe   nodular opacity which may correspond to the patient's history of lung   cancer.    Comparison with prior older chest CTs are recommended for complete   evaluation of the above findings and if images are made available, an   addendum can be issued.    Minimal left pleural fluid with interval decrease in size since May 23,   2024.

## 2024-05-31 NOTE — PROGRESS NOTE ADULT - ASSESSMENT
Stage III lung cancer  S/P neoadjuvant Immune therapy + Chemotherapy with a good response  S/P Taxol/ Carb + RT  Now possible pneumonia/pneumonitis  Anemia   Elevated Troponin         PLAN    Antibiotics/ steroids as per Pulm / medicine- continuing steroids for possible element of radiation pneumonitis  No evidence of progressive cancer  Procrit for progressive anemia however not needed today    Thank you for the courtesy of this consultation and we will continue to follow.    Thony Morris MD  [facility]  Cell: [phone]

## 2024-05-31 NOTE — PROGRESS NOTE ADULT - ASSESSMENT
"79M with PMH of Lung Ca s/p chemoradiation (last dose of both as per patient in April 2024) and HLD presents with SOB/Cough/Fevers and chills for 2-3 days. onset of cough and MCDONALD 2-3 days prior to admission and since then it has been progressively worsening. Started to have chills 1-2 days prior to admission and these symptoms remains persistent and getting worse. Denies syncope, chest pain, nausea, vomiting, abdominal pain/discomfort. Diarrhea reports (1 episode daily) and non-bloody. Cough productive with yellow and white sputum production. Denies having diagnosis of COPD.    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  Associated elevated troponin secondary to supply/demand ischemia due to above  Lung cancer s/p chemoradiation (last dose reported april 2024).   -Troponins elevated in 100s without significant rise .Telemetry x 24 hours->no events->DC tele  - Cardiology consult.   -CXRAY and CT noted  -Empiric anitbiotics. Completed Cefepime/Doxy   -TTE unremarkable  -EKG NSR without ST changes.   -Lactate WNL  -Cultures NGTD  -O2 supplementation  -Albuterol/Symbicort  -IV Solumedrol. Down titrate as tolerated  -Repeat CXRAY (5/27) with vascular congestion (likely fluid retention from steroids). IV Lasix 20mg x 1 ordered on 5/28. Continue to monitor.   -Hem/Onc consult/recs  -Repeat CT findings noted. Additional dose of lasix ordered on 5/29. Continue to monitor->subsequently hyponatremia. hold any further diuresis at this time  -5/28: no real change in o2 requirements despite antibiotics, steroids, diuresis. No PE on admission CTA. Large A-a gradient on ABG (5/29). 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 (titrate insulin accordingly with anticipated hyperglycemia augmented by steroids).     HLD  -Continue statin    Prediabetes with Hyperglycemia due to steroids  -Augmented by administration of solumedrol  -A1c 6.4%  -BGM/ISS/Lantus. Titrate accordingly.     DVT Prophylaxis: Lovenox subq  Disposition: Still medically active.  Pending improvement in hypoxia.

## 2024-05-31 NOTE — PROGRESS NOTE ADULT - SUBJECTIVE AND OBJECTIVE BOX
Patient is a 79y old  Male who presents with a chief complaint of Cough/SOB/Fevers (24 May 2024 22:41)      HPI:  79M with PMH of Lung Ca s/p chemoradiation (last dose of both as per patient in April 2024) and HLD presents with SOB/Cough/Fevers and chills for 2-3 days. onset of cough and MCDONALD 2-3 days prior to admission and since then it has been progressively worsening. Started to have chills 1-2 days prior to admission and these symptoms remains persistent and getting worse. Denies syncope, chest pain, nausea, vomiting, abdominal pain/discomfort. Diarrhea reports (1 episode daily) and non-bloody. Cough productive with yellow and white sputum production. Denies having diagnosis of COPD. Former tobacco use quit many years ago.     In the ER Tmax 100.4F, -108, BP 14/83, RR 16-28, SpO2 85% on RA. WBC 9.49, Hgb 11.1, Na 134 otherwise CMP grossly unremarkable. Lactate 1.9, ProBNP 657, Troponin 116.63->149.07.     < from: CT Angio Chest PE Protocol w/ IV Cont (05.23.24 @ 11:34) >  IMPRESSION:    No pulmonary embolism through the level of the segmental pulmonary   arteries. Evaluation of some subsegmental pulmonary arteries is limited.    Left lower lobe nodular opacity likely known lung cancer. Left parahilar   radiation changes.    Nonspecific diffuse groundglass throughout the right lung, differential   for which includes pneumonitis or infection.    < end of copied text >       (23 May 2024 16:38)      PAST MEDICAL & SURGICAL HISTORY:  HLD (hyperlipidemia)      Hyperlipemia      Lung cancer      No significant past surgical history          PREVIOUS DIAGNOSTIC TESTING:      MEDICATIONS  (STANDING):  albuterol    0.083% 2.5 milliGRAM(s) Nebulizer every 6 hours  albuterol    90 MICROgram(s) HFA Inhaler 1 Puff(s) Inhalation every 4 hours  aspirin enteric coated 81 milliGRAM(s) Oral daily  atorvastatin 40 milliGRAM(s) Oral at bedtime  budesonide 160 MICROgram(s)/formoterol 4.5 MICROgram(s) Inhaler 2 Puff(s) Inhalation two times a day  cefepime  Injectable. 2000 milliGRAM(s) IV Push every 8 hours  cefepime  Injectable.      clotrimazole Lozenge 1 Lozenge Oral <User Schedule>  dextrose 10% Bolus 125 milliLiter(s) IV Bolus once  dextrose 5%. 1000 milliLiter(s) (50 mL/Hr) IV Continuous <Continuous>  dextrose 5%. 1000 milliLiter(s) (100 mL/Hr) IV Continuous <Continuous>  dextrose 50% Injectable 25 Gram(s) IV Push once  dextrose 50% Injectable 12.5 Gram(s) IV Push once  doxycycline monohydrate Capsule 100 milliGRAM(s) Oral every 12 hours  enoxaparin Injectable 40 milliGRAM(s) SubCutaneous every 24 hours  glucagon  Injectable 1 milliGRAM(s) IntraMuscular once  insulin lispro (ADMELOG) corrective regimen sliding scale   SubCutaneous at bedtime  insulin lispro (ADMELOG) corrective regimen sliding scale   SubCutaneous three times a day before meals  lisinopril 10 milliGRAM(s) Oral daily  methylPREDNISolone sodium succinate Injectable 40 milliGRAM(s) IV Push daily  metoprolol succinate ER 25 milliGRAM(s) Oral daily  tiotropium 2.5 MICROgram(s) Inhaler 2 Puff(s) Inhalation daily    MEDICATIONS  (PRN):  acetaminophen     Tablet .. 650 milliGRAM(s) Oral every 6 hours PRN Temp greater or equal to 38C (100.4F), Mild Pain (1 - 3)  aluminum hydroxide/magnesium hydroxide/simethicone Suspension 30 milliLiter(s) Oral every 4 hours PRN Dyspepsia  benzonatate 100 milliGRAM(s) Oral every 8 hours PRN Cough  dextrose Oral Gel 15 Gram(s) Oral once PRN Blood Glucose LESS THAN 70 milliGRAM(s)/deciliter  guaiFENesin Oral Liquid (Sugar-Free) 100 milliGRAM(s) Oral every 6 hours PRN Cough  melatonin 3 milliGRAM(s) Oral at bedtime PRN Insomnia  ondansetron Injectable 4 milliGRAM(s) IV Push every 8 hours PRN Nausea and/or Vomiting      FAMILY HISTORY:  No pertinent family history in first degree relatives        Vital Signs Last 24 Hrs  T(C): 36.4 (31 May 2024 07:51), Max: 36.4 (31 May 2024 07:51)  T(F): 97.5 (31 May 2024 07:51), Max: 97.5 (31 May 2024 07:51)  HR: 88 (31 May 2024 07:51) (74 - 88)  BP: 118/57 (31 May 2024 07:51) (101/57 - 118/57)  BP(mean): --  RR: 17 (31 May 2024 07:51) (17 - 18)  SpO2: 100% (31 May 2024 07:51) (97% - 100%)    Parameters below as of 31 May 2024 07:51  Patient On (Oxygen Delivery Method): nasal cannula          PHYSICAL EXAM  General Appearance: cooperative, no acute distress,   HEENT: PERRL, conjunctiva clear, EOM's intact, non injected pharynx, no exudate, TM   normal  Neck: Supple, , no adenopathy, thyroid: not enlarged, no carotid bruit or JVD  Back: Symmetric, no  tenderness,no soft tissue tenderness  Lungs: bibasilar crackles persist, no wheeze  Heart: Regular rate and rhythm, S1, S2 normal, no murmur, rub or gallop  Abdomen: Soft, non-tender, bowel sounds active , no hepatosplenomegaly  Extremities: no cyanosis or edema, no joint swelling  Skin: Skin color, texture normal, no rashes   Neurologic: Alert and oriented X3 , cranial nerves intact, sensory and motor normal,    ECG:    LABS:                          9.4    6.29  )-----------( 236      ( 24 May 2024 06:41 )             27.7     05-24    135  |  103  |  12  ----------------------------<  242<H>  4.2   |  24  |  0.69    Ca    8.6      24 May 2024 06:41  Phos  3.4     05-24  Mg     1.8     05-24    TPro  6.3  /  Alb  2.1<L>  /  TBili  0.5  /  DBili  x   /  AST  18  /  ALT  26  /  AlkPhos  97  05-24              Urinalysis Basic - ( 24 May 2024 06:41 )    Color: x / Appearance: x / SG: x / pH: x  Gluc: 242 mg/dL / Ketone: x  / Bili: x / Urobili: x   Blood: x / Protein: x / Nitrite: x   Leuk Esterase: x / RBC: x / WBC x   Sq Epi: x / Non Sq Epi: x / Bacteria: x      ABG - ( 29 May 2024 14:51 )  pH, Arterial: 7.43  pH, Blood: x     /  pCO2: 36    /  pO2: 83    / HCO3: 24    / Base Excess: -0.1  /  SaO2: 98                    RADIOLOGY & ADDITIONAL STUDIES:  < from: CT Angio Chest PE Protocol w/ IV Cont (05.23.24 @ 11:34) >  IMPRESSION:    No pulmonary embolism through the level of the segmental pulmonary   arteries. Evaluation of some subsegmental pulmonary arteries is limited.    Left lower lobe nodular opacity likely known lung cancer. Left parahilar   radiation changes.    Nonspecific diffuse groundglass throughout the right lung, differential   for which includes pneumonitis or infection.    < end of copied text >    Unchanged right greater than left groundglass opacities, which are   nonspecific, at least some of which may be related to radiation therapy.   Differential diagnostic considerations also but is not limited to include   superimposed inflammatory etiologies.    Unchanged nodular opacities including a dominant 2.6 cm left lower lobe   nodular opacity which may correspond to the patient's history of lung   cancer.    Comparison with prior older chest CTs are recommended for complete   evaluation of the above findings and if images are made available, an   addendum can be issued.    Minimal left pleural fluid with interval decrease in size since May 23,   2024.

## 2024-05-31 NOTE — PROGRESS NOTE ADULT - SUBJECTIVE AND OBJECTIVE BOX
Pt seen, resting in bed    MEDICATIONS  (STANDING):  albuterol    0.083% 2.5 milliGRAM(s) Nebulizer every 6 hours  albuterol    90 MICROgram(s) HFA Inhaler 1 Puff(s) Inhalation every 4 hours  aspirin enteric coated 81 milliGRAM(s) Oral daily  atorvastatin 40 milliGRAM(s) Oral at bedtime  budesonide 160 MICROgram(s)/formoterol 4.5 MICROgram(s) Inhaler 2 Puff(s) Inhalation two times a day  clotrimazole Lozenge 1 Lozenge Oral <User Schedule>  dextrose 10% Bolus 125 milliLiter(s) IV Bolus once  dextrose 5%. 1000 milliLiter(s) (50 mL/Hr) IV Continuous <Continuous>  dextrose 5%. 1000 milliLiter(s) (100 mL/Hr) IV Continuous <Continuous>  dextrose 50% Injectable 25 Gram(s) IV Push once  dextrose 50% Injectable 12.5 Gram(s) IV Push once  enoxaparin Injectable 40 milliGRAM(s) SubCutaneous every 24 hours  glucagon  Injectable 1 milliGRAM(s) IntraMuscular once  insulin glargine Injectable (LANTUS) 5 Unit(s) SubCutaneous every morning  insulin lispro (ADMELOG) corrective regimen sliding scale   SubCutaneous three times a day before meals  insulin lispro (ADMELOG) corrective regimen sliding scale   SubCutaneous at bedtime  insulin lispro Injectable (ADMELOG) 2 Unit(s) SubCutaneous three times a day before meals  lisinopril 10 milliGRAM(s) Oral daily  methylPREDNISolone sodium succinate Injectable 40 milliGRAM(s) IV Push every 6 hours  metoprolol succinate ER 25 milliGRAM(s) Oral daily  tiotropium 2.5 MICROgram(s) Inhaler 2 Puff(s) Inhalation daily    MEDICATIONS  (PRN):  acetaminophen     Tablet .. 650 milliGRAM(s) Oral every 6 hours PRN Temp greater or equal to 38C (100.4F), Mild Pain (1 - 3)  aluminum hydroxide/magnesium hydroxide/simethicone Suspension 30 milliLiter(s) Oral every 4 hours PRN Dyspepsia  benzonatate 100 milliGRAM(s) Oral every 8 hours PRN Cough  dextrose Oral Gel 15 Gram(s) Oral once PRN Blood Glucose LESS THAN 70 milliGRAM(s)/deciliter  guaiFENesin Oral Liquid (Sugar-Free) 100 milliGRAM(s) Oral every 6 hours PRN Cough  melatonin 3 milliGRAM(s) Oral at bedtime PRN Insomnia  ondansetron Injectable 4 milliGRAM(s) IV Push every 8 hours PRN Nausea and/or Vomiting      ROS  No fever, sweats, chills       Vital Signs Last 24 Hrs  T(C): 36.4 (31 May 2024 07:51), Max: 36.4 (31 May 2024 07:51)  T(F): 97.5 (31 May 2024 07:51), Max: 97.5 (31 May 2024 07:51)  HR: 87 (31 May 2024 14:48) (74 - 88)  BP: 118/57 (31 May 2024 07:51) (101/57 - 118/57)  BP(mean): --  RR: 17 (31 May 2024 07:51) (17 - 18)  SpO2: 100% (31 May 2024 07:51) (97% - 100%)    Parameters below as of 31 May 2024 14:48  Patient On (Oxygen Delivery Method): nasal cannula, 4lpm        PE  NAD  Awake, alert     No rash grossly  FROM        05-31    127<L>  |  97  |  32<H>  ----------------------------<  165<H>  4.6   |  26  |  0.57    Ca    9.0      31 May 2024 06:57  Phos  3.1     05-30  Mg     1.8     05-30

## 2024-05-31 NOTE — PROGRESS NOTE ADULT - SUBJECTIVE AND OBJECTIVE BOX
HOSPITALIST ATTENDING PROGRESS NOTE    Chart and meds reviewed.      Subjective: Patient seen and examined. resting comfortably. Daughter at bedside. Seen by pulm, etiology liekly pneumonitis.  Continue IV steroids. Will need home o2, continue to wean o2 as tolerated       Additional results/Imaging, I have personally reviewed:    LABS:        05-31    127<L>  |  97  |  32<H>  ----------------------------<  165<H>  4.6   |  26  |  0.57    Ca    9.0      31 May 2024 06:57  Phos  3.1     05-30  Mg     1.8     05-30              Urinalysis Basic - ( 31 May 2024 06:57 )    Color: x / Appearance: x / SG: x / pH: x  Gluc: 165 mg/dL / Ketone: x  / Bili: x / Urobili: x   Blood: x / Protein: x / Nitrite: x   Leuk Esterase: x / RBC: x / WBC x   Sq Epi: x / Non Sq Epi: x / Bacteria: x              All other systems reviewed and found to be negative with the exception of what has been described above.    MEDICATIONS  (STANDING):  albuterol    0.083% 2.5 milliGRAM(s) Nebulizer every 6 hours  albuterol    90 MICROgram(s) HFA Inhaler 1 Puff(s) Inhalation every 4 hours  aspirin enteric coated 81 milliGRAM(s) Oral daily  atorvastatin 40 milliGRAM(s) Oral at bedtime  budesonide 160 MICROgram(s)/formoterol 4.5 MICROgram(s) Inhaler 2 Puff(s) Inhalation two times a day  clotrimazole Lozenge 1 Lozenge Oral <User Schedule>  dextrose 10% Bolus 125 milliLiter(s) IV Bolus once  dextrose 5%. 1000 milliLiter(s) (50 mL/Hr) IV Continuous <Continuous>  dextrose 5%. 1000 milliLiter(s) (100 mL/Hr) IV Continuous <Continuous>  dextrose 50% Injectable 25 Gram(s) IV Push once  dextrose 50% Injectable 12.5 Gram(s) IV Push once  enoxaparin Injectable 40 milliGRAM(s) SubCutaneous every 24 hours  glucagon  Injectable 1 milliGRAM(s) IntraMuscular once  insulin glargine Injectable (LANTUS) 5 Unit(s) SubCutaneous every morning  insulin lispro (ADMELOG) corrective regimen sliding scale   SubCutaneous at bedtime  insulin lispro (ADMELOG) corrective regimen sliding scale   SubCutaneous three times a day before meals  insulin lispro Injectable (ADMELOG) 2 Unit(s) SubCutaneous three times a day before meals  lisinopril 10 milliGRAM(s) Oral daily  methylPREDNISolone sodium succinate Injectable 40 milliGRAM(s) IV Push every 6 hours  metoprolol succinate ER 25 milliGRAM(s) Oral daily  tiotropium 2.5 MICROgram(s) Inhaler 2 Puff(s) Inhalation daily    MEDICATIONS  (PRN):  acetaminophen     Tablet .. 650 milliGRAM(s) Oral every 6 hours PRN Temp greater or equal to 38C (100.4F), Mild Pain (1 - 3)  aluminum hydroxide/magnesium hydroxide/simethicone Suspension 30 milliLiter(s) Oral every 4 hours PRN Dyspepsia  benzonatate 100 milliGRAM(s) Oral every 8 hours PRN Cough  dextrose Oral Gel 15 Gram(s) Oral once PRN Blood Glucose LESS THAN 70 milliGRAM(s)/deciliter  guaiFENesin Oral Liquid (Sugar-Free) 100 milliGRAM(s) Oral every 6 hours PRN Cough  melatonin 3 milliGRAM(s) Oral at bedtime PRN Insomnia  ondansetron Injectable 4 milliGRAM(s) IV Push every 8 hours PRN Nausea and/or Vomiting      VITALS:  T(F): 97.5 (05-31-24 @ 07:51), Max: 97.5 (05-31-24 @ 07:51)  HR: 87 (05-31-24 @ 14:48) (74 - 88)  BP: 118/57 (05-31-24 @ 07:51) (101/57 - 118/57)  RR: 17 (05-31-24 @ 07:51) (17 - 18)  SpO2: 100% (05-31-24 @ 07:51) (97% - 100%)  Wt(kg): --    I&O's Summary    30 May 2024 07:01  -  31 May 2024 07:00  --------------------------------------------------------  IN: 0 mL / OUT: 400 mL / NET: -400 mL        CAPILLARY BLOOD GLUCOSE      POCT Blood Glucose.: 219 mg/dL (31 May 2024 13:04)  POCT Blood Glucose.: 182 mg/dL (31 May 2024 08:32)  POCT Blood Glucose.: 169 mg/dL (30 May 2024 21:34)  POCT Blood Glucose.: 189 mg/dL (30 May 2024 16:39)      PHYSICAL EXAM:  Gen: No acute distress   HEENT:  pupils equal and reactive, EOMI,   NECK:   supple, no carotid bruits, No JVD  CV:  +S1, +S2, regular rate rhythm, no murmurs or rubs  RESP:   lungs clear to auscultation bilaterally, no wheezing, rales, rhonchi, good air entry bilaterally  GI:  abdomen soft, non-tender, non-distended, normal BS, no bruits, no abdominal masses, no palpable masses  MSK:   normal muscle tone, no atrophy, no rigidity, no contractions  EXT:  no clubbing, no cyanosis, no edema, no calf pain, swelling or erythema  VASCULAR:  pulses equal and symmetric in the upper and lower extremities  NEURO:  AAOX3, no focal neurological deficits, follows all commands, able to move extremities spontaneously  SKIN:  no ulcers, lesions or rashes      CULTURES:      Telemetry, personally reviewed

## 2024-05-31 NOTE — PROGRESS NOTE ADULT - ASSESSMENT
79M with PMH of Lung Ca s/p chemoradiation (last dose of both as per patient in April 2024) and HLD presents with SOB/Cough/Fevers and chills for 2-3 days. onset of cough and MCDONALD 2-3 days prior to admission and since then it has been progressively worsening. Started to have chills 1-2 days prior to admission and these symptoms remains persistent and getting worse. Denies syncope, chest pain, nausea, vomiting, abdominal pain/discomfort. Diarrhea reports (1 episode daily) and non-bloody. Cough productive with yellow and white sputum production. Denies having diagnosis of COPD. Former tobacco use quit many years ago.     In the ER Tmax 100.4F, -108, BP 14/83, RR 16-28, SpO2 85% on RA. WBC 9.49, Hgb 11.1, Na 134 otherwise CMP grossly unremarkable. Lactate 1.9, ProBNP 657, Troponin 116.63->149.07.     < from: CT Angio Chest PE Protocol w/ IV Cont (05.23.24 @ 11:34) >  IMPRESSION:    No pulmonary embolism through the level of the segmental pulmonary   arteries. Evaluation of some subsegmental pulmonary arteries is limited.    Left lower lobe nodular opacity likely known lung cancer. Left parahilar   radiation changes.    Nonspecific diffuse groundglass throughout the right lung, differential   for which includes pneumonitis or infection.    repeat ct chest 5/28 noted      Unchanged right greater than left groundglass opacities, which are   nonspecific, at least some of which may be related to radiation therapy.   Differential diagnostic considerations also but is not limited to include   superimposed inflammatory etiologies.    Unchanged nodular opacities including a dominant 2.6 cm left lower lobe   nodular opacity which may correspond to the patient's history of lung   cancer.    Comparison with prior older chest CTs are recommended for complete   evaluation of the above findings and if images are made available, an   addendum can be issued.    Minimal left pleural fluid with interval decrease in size since May 23,   2024.    Assessment / plan;  Lung ca s/p chemo RT  suspected pneumonitis  no definite consolidation / pneumonia  adequate oxygenation but hypoxemia on exertion  No hemoptysis or discolored sputum  case discussed with Dr Elder  complete course of antibiotics  slow steroid taper  complete course of steroids and antibiotics  walk test for re eval hypoxemia on exertion  mobilize OOb and re eval oxygenation on exertion  ABG - ( 29 May 2024 14:51 )  pH, Arterial: 7.43  pH, Blood: x     /  pCO2: 36    /  pO2: 83    / HCO3: 24    / Base Excess: -0.1  /  SaO2: 98        Large A-a gradient despite antibiotics and steroids  etiology likley pneumonitis with persistent infiltrates and basilar crackles on exam  will need home o2 set up  switch to po ceftin /lower dose of cefepime IV  Doubt PE with negative CTA and alternate dx available with pneumonitis seen on ct scan  concern wit persistent infiltrates despite treatment  not a good candidate for lung bx  conservative measures appropriate  discussed with hospitalist  all recent films personally reviewed    data discussed with hospitalist  palliative care team noted  Dr Fontana is covering 6/1-6/2

## 2024-06-01 NOTE — PROGRESS NOTE ADULT - SUBJECTIVE AND OBJECTIVE BOX
HOSPITALIST ATTENDING PROGRESS NOTE    Chart and meds reviewed.      Subjective: Patient seen and examined. Resting comfortably. Continue IV steroids. O2 weaned to 3L.       Additional results/Imaging, I have personally reviewed:    LABS:                            11.5   13.84 )-----------( 291      ( 01 Jun 2024 06:48 )             33.2     06-01    124<L>  |  93<L>  |  29<H>  ----------------------------<  163<H>  4.4   |  27  |  0.55    Ca    8.9      01 Jun 2024 06:48              Urinalysis Basic - ( 01 Jun 2024 06:48 )    Color: x / Appearance: x / SG: x / pH: x  Gluc: 163 mg/dL / Ketone: x  / Bili: x / Urobili: x   Blood: x / Protein: x / Nitrite: x   Leuk Esterase: x / RBC: x / WBC x   Sq Epi: x / Non Sq Epi: x / Bacteria: x              All other systems reviewed and found to be negative with the exception of what has been described above.    MEDICATIONS  (STANDING):  albuterol    0.083% 2.5 milliGRAM(s) Nebulizer every 6 hours  albuterol    90 MICROgram(s) HFA Inhaler 1 Puff(s) Inhalation every 4 hours  aspirin enteric coated 81 milliGRAM(s) Oral daily  atorvastatin 40 milliGRAM(s) Oral at bedtime  budesonide 160 MICROgram(s)/formoterol 4.5 MICROgram(s) Inhaler 2 Puff(s) Inhalation two times a day  clotrimazole Lozenge 1 Lozenge Oral <User Schedule>  dextrose 10% Bolus 125 milliLiter(s) IV Bolus once  dextrose 5%. 1000 milliLiter(s) (100 mL/Hr) IV Continuous <Continuous>  dextrose 5%. 1000 milliLiter(s) (50 mL/Hr) IV Continuous <Continuous>  dextrose 50% Injectable 25 Gram(s) IV Push once  dextrose 50% Injectable 12.5 Gram(s) IV Push once  enoxaparin Injectable 40 milliGRAM(s) SubCutaneous every 24 hours  glucagon  Injectable 1 milliGRAM(s) IntraMuscular once  insulin glargine Injectable (LANTUS) 5 Unit(s) SubCutaneous every morning  insulin lispro (ADMELOG) corrective regimen sliding scale   SubCutaneous three times a day before meals  insulin lispro (ADMELOG) corrective regimen sliding scale   SubCutaneous at bedtime  insulin lispro Injectable (ADMELOG) 2 Unit(s) SubCutaneous three times a day before meals  lisinopril 10 milliGRAM(s) Oral daily  methylPREDNISolone sodium succinate Injectable 40 milliGRAM(s) IV Push every 6 hours  metoprolol succinate ER 25 milliGRAM(s) Oral daily  tiotropium 2.5 MICROgram(s) Inhaler 2 Puff(s) Inhalation daily    MEDICATIONS  (PRN):  acetaminophen     Tablet .. 650 milliGRAM(s) Oral every 6 hours PRN Temp greater or equal to 38C (100.4F), Mild Pain (1 - 3)  aluminum hydroxide/magnesium hydroxide/simethicone Suspension 30 milliLiter(s) Oral every 4 hours PRN Dyspepsia  benzonatate 100 milliGRAM(s) Oral every 8 hours PRN Cough  dextrose Oral Gel 15 Gram(s) Oral once PRN Blood Glucose LESS THAN 70 milliGRAM(s)/deciliter  guaiFENesin Oral Liquid (Sugar-Free) 100 milliGRAM(s) Oral every 6 hours PRN Cough  melatonin 3 milliGRAM(s) Oral at bedtime PRN Insomnia  ondansetron Injectable 4 milliGRAM(s) IV Push every 8 hours PRN Nausea and/or Vomiting      VITALS:  T(F): 97.9 (06-01-24 @ 08:02), Max: 97.9 (05-31-24 @ 20:45)  HR: 76 (06-01-24 @ 08:08) (71 - 88)  BP: 134/73 (06-01-24 @ 08:02) (118/58 - 134/73)  RR: 18 (06-01-24 @ 08:02) (18 - 18)  SpO2: 92% (06-01-24 @ 08:02) (92% - 96%)  Wt(kg): --    I&O's Summary      CAPILLARY BLOOD GLUCOSE      POCT Blood Glucose.: 338 mg/dL (01 Jun 2024 10:06)  POCT Blood Glucose.: 149 mg/dL (01 Jun 2024 08:00)  POCT Blood Glucose.: 240 mg/dL (31 May 2024 21:53)  POCT Blood Glucose.: 79 mg/dL (31 May 2024 16:54)      PHYSICAL EXAM:  General: AAOx3; NAD: Frail  ENT: Moist Mucous Membranes; No Injury  Neck: Non-tender; No JVD  CVS: RRR, S1&S2, No murmur, No edema  Respiratory: Decreased basilar breath sounds R>L; ; Interval improvement Normal Respiratory Effort; respiratory support with NC  Abdomen/GI: Soft, non-tender, non-distended, no guarding, no rebound, normal bowel sounds  Extremities: No cyanosis, No clubbing, No edema  Neuro: AAOx3, CNII-XII grossly intact, non-focal  Skin: Clean, Dry and Intact      CULTURES:      Telemetry, personally reviewed

## 2024-06-01 NOTE — PROGRESS NOTE ADULT - ASSESSMENT
79M with PMH of Lung Ca s/p chemoradiation (last dose of both as per patient in April 2024) and HLD presents with SOB/Cough/Fevers and chills for 2-3 days. onset of cough and MCDONALD 2-3 days prior to admission and since then it has been progressively worsening. Started to have chills 1-2 days prior to admission and these symptoms remains persistent and getting worse. Denies syncope, chest pain, nausea, vomiting, abdominal pain/discomfort. Diarrhea reports (1 episode daily) and non-bloody. Cough productive with yellow and white sputum production. Denies having diagnosis of COPD.    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  Associated elevated troponin secondary to supply/demand ischemia due to above  Lung cancer s/p chemoradiation (last dose reported april 2024).   -Troponins elevated in 100s without significant rise .Telemetry x 24 hours->no events->DC tele  - Cardiology consult.   -CXRAY and CT noted  -Empiric anitbiotics. Completed Cefepime/Doxy   -TTE unremarkable  -EKG NSR without ST changes.   -Lactate WNL  -Cultures NGTD  -O2 supplementation  -Albuterol/Symbicort  -IV Solumedrol. Down titrate as tolerated  -Repeat CXRAY (5/27) with vascular congestion (likely fluid retention from steroids). IV Lasix 20mg x 1 ordered on 5/28. Continue to monitor.   -Hem/Onc consult/recs  -Repeat CT findings noted. Additional dose of lasix ordered on 5/29. Continue to monitor->subsequently hyponatremia. hold any further diuresis at this time  -5/28: no real change in o2 requirements despite antibiotics, steroids, diuresis. No PE on admission CTA. Large A-a gradient on ABG (5/29). 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 (titrate insulin accordingly with anticipated hyperglycemia augmented by steroids).   6/1 - Continue IV steroids at this time. Wean to 40mg q8 , continue to wean o2 as tolerated     HLD  -Continue statin    Prediabetes with Hyperglycemia due to steroids  -Augmented by administration of solumedrol  -A1c 6.4%  -BGM/ISS/Lantus. Titrate accordingly.     DVT Prophylaxis: Lovenox subq  Disposition: Still medically active.  Pending improvement in hypoxia.

## 2024-06-02 NOTE — PROGRESS NOTE ADULT - ASSESSMENT
79M with PMH of Lung Ca s/p chemoradiation (last dose of both as per patient in April 2024) and HLD presents with SOB/Cough/Fevers and chills for 2-3 days. onset of cough and MCDONALD 2-3 days prior to admission and since then it has been progressively worsening. Started to have chills 1-2 days prior to admission and these symptoms remains persistent and getting worse. Denies syncope, chest pain, nausea, vomiting, abdominal pain/discomfort. Diarrhea reports (1 episode daily) and non-bloody. Cough productive with yellow and white sputum production. Denies having diagnosis of COPD.    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  Associated elevated troponin secondary to supply/demand ischemia due to above  Lung cancer s/p chemoradiation (last dose reported april 2024).   -Troponins elevated in 100s without significant rise .Telemetry x 24 hours->no events->DC tele  - Cardiology consult.   -CXRAY and CT noted  -Empiric anitbiotics. Completed Cefepime/Doxy   -TTE unremarkable  -EKG NSR without ST changes.   -Lactate WNL  -Cultures NGTD  -O2 supplementation  -Albuterol/Symbicort  -IV Solumedrol. Down titrate as tolerated  -Repeat CXRAY (5/27) with vascular congestion (likely fluid retention from steroids). IV Lasix 20mg x 1 ordered on 5/28. Continue to monitor.   -Hem/Onc consult/recs  -Repeat CT findings noted. Additional dose of lasix ordered on 5/29. Continue to monitor->subsequently hyponatremia. hold any further diuresis at this time  -5/28: no real change in o2 requirements despite antibiotics, steroids, diuresis. No PE on admission CTA. Large A-a gradient on ABG (5/29). 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 (titrate insulin accordingly with anticipated hyperglycemia augmented by steroids).   6/2 - Continue IV steroids at this time. continue 40mg q8 , continue to wean o2 as tolerated     HLD  -Continue statin    Hyponatremia  Continue to monitor   Encourage oral intake     Prediabetes with Hyperglycemia due to steroids  -Augmented by administration of solumedrol  -A1c 6.4%  -BGM/ISS/Lantus. Titrate accordingly.     DVT Prophylaxis: Lovenox subq  Disposition: Still medically active.  Pending improvement in hypoxia.

## 2024-06-02 NOTE — PROGRESS NOTE ADULT - SUBJECTIVE AND OBJECTIVE BOX
HOSPITALIST ATTENDING PROGRESS NOTE    Chart and meds reviewed.      Subjective: Patient seen and examined.Resting comfortably. reports feeling improved. Denies worsening sob. Continue IV steroids. O2 at 3L.       Additional results/Imaging, I have personally reviewed:    LABS:                            11.6   13.51 )-----------( 258      ( 02 Jun 2024 06:39 )             33.9     06-02    129<L>  |  95<L>  |  21  ----------------------------<  156<H>  4.7   |  29  |  0.64    Ca    8.8      02 Jun 2024 06:39              Urinalysis Basic - ( 02 Jun 2024 06:39 )    Color: x / Appearance: x / SG: x / pH: x  Gluc: 156 mg/dL / Ketone: x  / Bili: x / Urobili: x   Blood: x / Protein: x / Nitrite: x   Leuk Esterase: x / RBC: x / WBC x   Sq Epi: x / Non Sq Epi: x / Bacteria: x              All other systems reviewed and found to be negative with the exception of what has been described above.    MEDICATIONS  (STANDING):  albuterol    0.083% 2.5 milliGRAM(s) Nebulizer every 6 hours  albuterol    90 MICROgram(s) HFA Inhaler 1 Puff(s) Inhalation every 4 hours  aspirin enteric coated 81 milliGRAM(s) Oral daily  atorvastatin 40 milliGRAM(s) Oral at bedtime  budesonide 160 MICROgram(s)/formoterol 4.5 MICROgram(s) Inhaler 2 Puff(s) Inhalation two times a day  clotrimazole Lozenge 1 Lozenge Oral <User Schedule>  dextrose 10% Bolus 125 milliLiter(s) IV Bolus once  dextrose 5%. 1000 milliLiter(s) (100 mL/Hr) IV Continuous <Continuous>  dextrose 5%. 1000 milliLiter(s) (50 mL/Hr) IV Continuous <Continuous>  dextrose 50% Injectable 25 Gram(s) IV Push once  dextrose 50% Injectable 12.5 Gram(s) IV Push once  enoxaparin Injectable 40 milliGRAM(s) SubCutaneous every 24 hours  glucagon  Injectable 1 milliGRAM(s) IntraMuscular once  insulin glargine Injectable (LANTUS) 5 Unit(s) SubCutaneous every morning  insulin lispro (ADMELOG) corrective regimen sliding scale   SubCutaneous three times a day before meals  insulin lispro (ADMELOG) corrective regimen sliding scale   SubCutaneous at bedtime  insulin lispro Injectable (ADMELOG) 2 Unit(s) SubCutaneous three times a day before meals  lisinopril 10 milliGRAM(s) Oral daily  methylPREDNISolone sodium succinate Injectable 40 milliGRAM(s) IV Push every 8 hours  metoprolol succinate ER 25 milliGRAM(s) Oral daily  tiotropium 2.5 MICROgram(s) Inhaler 2 Puff(s) Inhalation daily    MEDICATIONS  (PRN):  acetaminophen     Tablet .. 650 milliGRAM(s) Oral every 6 hours PRN Temp greater or equal to 38C (100.4F), Mild Pain (1 - 3)  aluminum hydroxide/magnesium hydroxide/simethicone Suspension 30 milliLiter(s) Oral every 4 hours PRN Dyspepsia  benzonatate 100 milliGRAM(s) Oral every 8 hours PRN Cough  dextrose Oral Gel 15 Gram(s) Oral once PRN Blood Glucose LESS THAN 70 milliGRAM(s)/deciliter  guaiFENesin Oral Liquid (Sugar-Free) 100 milliGRAM(s) Oral every 6 hours PRN Cough  melatonin 3 milliGRAM(s) Oral at bedtime PRN Insomnia  ondansetron Injectable 4 milliGRAM(s) IV Push every 8 hours PRN Nausea and/or Vomiting      VITALS:  T(F): 97.5 (06-02-24 @ 08:22), Max: 97.7 (06-01-24 @ 21:23)  HR: 77 (06-02-24 @ 08:22) (77 - 94)  BP: 118/62 (06-02-24 @ 08:22) (118/62 - 122/54)  RR: 18 (06-02-24 @ 08:22) (18 - 18)  SpO2: 94% (06-02-24 @ 08:22) (94% - 95%)  Wt(kg): --    I&O's Summary      CAPILLARY BLOOD GLUCOSE      POCT Blood Glucose.: 241 mg/dL (02 Jun 2024 11:15)  POCT Blood Glucose.: 151 mg/dL (02 Jun 2024 07:41)  POCT Blood Glucose.: 270 mg/dL (01 Jun 2024 21:44)  POCT Blood Glucose.: 148 mg/dL (01 Jun 2024 16:19)      PHYSICAL EXAM:  General: AAOx3; NAD: Frail  ENT: Moist Mucous Membranes; No Injury  Neck: Non-tender; No JVD  CVS: RRR, S1&S2, No murmur, No edema  Respiratory: Decreased basilar breath sounds R>L; ; Interval improvement Normal Respiratory Effort; respiratory support with NC  Abdomen/GI: Soft, non-tender, non-distended, no guarding, no rebound, normal bowel sounds  Extremities: No cyanosis, No clubbing, No edema  Neuro: AAOx3, CNII-XII grossly intact, non-focal  Skin: Clean, Dry and Intact      CULTURES:      Telemetry, personally reviewed

## 2024-06-03 NOTE — PROGRESS NOTE ADULT - ASSESSMENT
79M with PMH of Lung Ca s/p chemoradiation (last dose of both as per patient in April 2024) and HLD presents with SOB/Cough/Fevers and chills for 2-3 days. onset of cough and MCDONALD 2-3 days prior to admission and since then it has been progressively worsening. Started to have chills 1-2 days prior to admission and these symptoms remains persistent and getting worse. Denies syncope, chest pain, nausea, vomiting, abdominal pain/discomfort. Diarrhea reports (1 episode daily) and non-bloody. Cough productive with yellow and white sputum production. Denies having diagnosis of COPD.    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  Associated elevated troponin secondary to supply/demand ischemia due to above  Lung cancer s/p chemoradiation (last dose reported april 2024).   -Troponins elevated in 100s without significant rise .Telemetry x 24 hours->no events->DC tele  - Cardiology consult.   -CXRAY and CT noted  -Empiric anitbiotics. Completed Cefepime/Doxy   -TTE unremarkable  -EKG NSR without ST changes.   -Lactate WNL  -Cultures NGTD  -O2 supplementation  -Albuterol/Symbicort  -IV Solumedrol. Down titrate as tolerated  -Repeat CXRAY (5/27) with vascular congestion (likely fluid retention from steroids). IV Lasix 20mg x 1 ordered on 5/28. Continue to monitor.   -Hem/Onc consult/recs  -Repeat CT findings noted. Additional dose of lasix ordered on 5/29. Continue to monitor->subsequently hyponatremia. hold any further diuresis at this time  -5/28: no real change in o2 requirements despite antibiotics, steroids, diuresis. No PE on admission CTA. Large A-a gradient on ABG (5/29). 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 (titrate insulin accordingly with anticipated hyperglycemia augmented by steroids).   6/3 - Continue IV steroids at this time. wean steroids to  40mg q8 , continue to wean o2 as tolerated     HLD  -Continue statin    Hyponatremia  Continue to monitor   Encourage oral intake     Prediabetes with Hyperglycemia due to steroids  -Augmented by administration of solumedrol  -A1c 6.4%  -BGM/ISS/Lantus. Titrate accordingly.     DVT Prophylaxis: Lovenox subq  Disposition: Still medically active.  Pending improvement in hypoxia.      79M with PMH of Lung Ca s/p chemoradiation (last dose of both as per patient in April 2024) and HLD presents with SOB/Cough/Fevers and chills for 2-3 days. onset of cough and MCDONALD 2-3 days prior to admission and since then it has been progressively worsening. Started to have chills 1-2 days prior to admission and these symptoms remains persistent and getting worse. Denies syncope, chest pain, nausea, vomiting, abdominal pain/discomfort. Diarrhea reports (1 episode daily) and non-bloody. Cough productive with yellow and white sputum production. Denies having diagnosis of COPD.    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  Associated elevated troponin secondary to supply/demand ischemia due to above  Lung cancer s/p chemoradiation (last dose reported april 2024).   -Troponins elevated in 100s without significant rise .Telemetry x 24 hours->no events->DC tele  - Cardiology consult.   -CXRAY and CT noted  -Empiric anitbiotics. Completed Cefepime/Doxy   -TTE unremarkable  -EKG NSR without ST changes.   -Lactate WNL  -Cultures NGTD  -O2 supplementation  -Albuterol/Symbicort  -IV Solumedrol. Down titrate as tolerated  -Repeat CXRAY (5/27) with vascular congestion (likely fluid retention from steroids). IV Lasix 20mg x 1 ordered on 5/28. Continue to monitor.   -Hem/Onc consult/recs  -Repeat CT findings noted. Additional dose of lasix ordered on 5/29. Continue to monitor->subsequently hyponatremia. hold any further diuresis at this time  -5/28: no real change in o2 requirements despite antibiotics, steroids, diuresis. No PE on admission CTA. Large A-a gradient on ABG (5/29). 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 (titrate insulin accordingly with anticipated hyperglycemia augmented by steroids).   6/3 - Continue IV steroids at this time. wean steroids to  40mg q8 , continue to wean o2 as tolerated     HLD  -Continue statin    Hyponatremia  Continue to monitor   Encourage oral intake     Prediabetes with Hyperglycemia due to steroids  -Augmented by administration of solumedrol  -A1c 6.4%  -BGM/ISS/Lantus. Titrate accordingly.     DVT Prophylaxis: Lovenox subq  Disposition: Still medically active.  Pending improvement in hypoxia.       3:1 commode;  pt will require a 3:1 commode as pt incapable of ambulating to bathroom as patient is confined to a single room without a bathroom.

## 2024-06-03 NOTE — PROGRESS NOTE ADULT - ASSESSMENT
79M with PMH of Lung Ca s/p chemoradiation (last dose of both as per patient in April 2024) and HLD presents with SOB/Cough/Fevers and chills for 2-3 days. onset of cough and MCDONALD 2-3 days prior to admission and since then it has been progressively worsening. Started to have chills 1-2 days prior to admission and these symptoms remains persistent and getting worse. Denies syncope, chest pain, nausea, vomiting, abdominal pain/discomfort. Diarrhea reports (1 episode daily) and non-bloody. Cough productive with yellow and white sputum production. Denies having diagnosis of COPD. Former tobacco use quit many years ago.   In the ER Tmax 100.4F, -108, BP 14/83, RR 16-28, SpO2 85% on RA. WBC 9.49, Hgb 11.1, Na 134 otherwise CMP grossly unremarkable. Lactate 1.9, ProBNP 657, Troponin 116.63->149.07.   Left lower lobe nodular opacity likely known lung cancer. Left parahilar   radiation changes. Nonspecific diffuse groundglass throughout the right lung, differential   for which includes pneumonitis or infection. repeat ct chest 5/28 noted    Lung ca s/p chemo RT  suspected pneumonitis  no definite consolidation / pneumonia  adequate oxygenation but hypoxemia on exertion  No hemoptysis or discolored sputum  complete course of antibiotics  slow steroid taper  complete course of steroids and antibiotics  walk test for re eval hypoxemia on exertion  mobilize OOb and re eval oxygenation on exertion  ABG - ( 29 May 2024 14:51 )  pH, Arterial: 7.43  pH, Blood: x     /  pCO2: 36    /  pO2: 83    / HCO3: 24    / Base Excess: -0.1  /  SaO2: 98      Large A-a gradient despite antibiotics and steroids  etiology likley pneumonitis with persistent infiltrates and basilar crackles on exam  will need home o2 set up  switch to po ceftin /lower dose of cefepime IV  Doubt PE with negative CTA and alternate dx available with pneumonitis seen on ct scan  concern wit persistent infiltrates despite treatment  not a good candidate for lung bx  conservative measures appropriate  discussed with hospitalist  all recent films personally reviewed  data discussed with hospitalist  palliative care team noted  Dr Abel resuming 6/4

## 2024-06-03 NOTE — PROGRESS NOTE ADULT - SUBJECTIVE AND OBJECTIVE BOX
HOSPITALIST ATTENDING PROGRESS NOTE    Chart and meds reviewed.      Subjective: Patient seen and examined. Sitting comfortably  in chair Wean steroids to 40mg BID. Continue to slowly wean, will do home o2 eval before DC in 2-3 days       Additional results/Imaging, I have personally reviewed:    LABS:                            11.4   18.25 )-----------( 273      ( 03 Jun 2024 06:54 )             33.6     06-03    126<L>  |  93<L>  |  28<H>  ----------------------------<  170<H>  4.4   |  28  |  0.46<L>    Ca    8.7      03 Jun 2024 06:54              Urinalysis Basic - ( 03 Jun 2024 06:54 )    Color: x / Appearance: x / SG: x / pH: x  Gluc: 170 mg/dL / Ketone: x  / Bili: x / Urobili: x   Blood: x / Protein: x / Nitrite: x   Leuk Esterase: x / RBC: x / WBC x   Sq Epi: x / Non Sq Epi: x / Bacteria: x              All other systems reviewed and found to be negative with the exception of what has been described above.    MEDICATIONS  (STANDING):  albuterol    0.083% 2.5 milliGRAM(s) Nebulizer every 6 hours  albuterol    90 MICROgram(s) HFA Inhaler 1 Puff(s) Inhalation every 4 hours  aspirin enteric coated 81 milliGRAM(s) Oral daily  atorvastatin 40 milliGRAM(s) Oral at bedtime  budesonide 160 MICROgram(s)/formoterol 4.5 MICROgram(s) Inhaler 2 Puff(s) Inhalation two times a day  clotrimazole Lozenge 1 Lozenge Oral <User Schedule>  dextrose 10% Bolus 125 milliLiter(s) IV Bolus once  dextrose 5%. 1000 milliLiter(s) (50 mL/Hr) IV Continuous <Continuous>  dextrose 5%. 1000 milliLiter(s) (100 mL/Hr) IV Continuous <Continuous>  dextrose 50% Injectable 25 Gram(s) IV Push once  dextrose 50% Injectable 12.5 Gram(s) IV Push once  enoxaparin Injectable 40 milliGRAM(s) SubCutaneous every 24 hours  glucagon  Injectable 1 milliGRAM(s) IntraMuscular once  insulin glargine Injectable (LANTUS) 5 Unit(s) SubCutaneous every morning  insulin lispro (ADMELOG) corrective regimen sliding scale   SubCutaneous three times a day before meals  insulin lispro (ADMELOG) corrective regimen sliding scale   SubCutaneous at bedtime  insulin lispro Injectable (ADMELOG) 2 Unit(s) SubCutaneous three times a day before meals  lisinopril 10 milliGRAM(s) Oral daily  methylPREDNISolone sodium succinate Injectable 40 milliGRAM(s) IV Push two times a day  metoprolol succinate ER 25 milliGRAM(s) Oral daily  tiotropium 2.5 MICROgram(s) Inhaler 2 Puff(s) Inhalation daily    MEDICATIONS  (PRN):  acetaminophen     Tablet .. 650 milliGRAM(s) Oral every 6 hours PRN Temp greater or equal to 38C (100.4F), Mild Pain (1 - 3)  aluminum hydroxide/magnesium hydroxide/simethicone Suspension 30 milliLiter(s) Oral every 4 hours PRN Dyspepsia  benzonatate 100 milliGRAM(s) Oral every 8 hours PRN Cough  dextrose Oral Gel 15 Gram(s) Oral once PRN Blood Glucose LESS THAN 70 milliGRAM(s)/deciliter  guaiFENesin Oral Liquid (Sugar-Free) 100 milliGRAM(s) Oral every 6 hours PRN Cough  melatonin 3 milliGRAM(s) Oral at bedtime PRN Insomnia  ondansetron Injectable 4 milliGRAM(s) IV Push every 8 hours PRN Nausea and/or Vomiting      VITALS:  T(F): 98.2 (06-02-24 @ 20:44), Max: 98.2 (06-02-24 @ 20:44)  HR: 80 (06-03-24 @ 13:27) (79 - 83)  BP: 118/56 (06-02-24 @ 20:44) (118/56 - 118/56)  RR: 18 (06-02-24 @ 20:44) (18 - 18)  SpO2: 97% (06-03-24 @ 01:35) (97% - 97%)  Wt(kg): --    I&O's Summary      CAPILLARY BLOOD GLUCOSE      POCT Blood Glucose.: 247 mg/dL (03 Jun 2024 11:52)  POCT Blood Glucose.: 166 mg/dL (03 Jun 2024 07:42)  POCT Blood Glucose.: 197 mg/dL (02 Jun 2024 21:40)  POCT Blood Glucose.: 206 mg/dL (02 Jun 2024 16:52)      PHYSICAL EXAM:  General: AAOx3; NAD: Frail  ENT: Moist Mucous Membranes; No Injury  Neck: Non-tender; No JVD  CVS: RRR, S1&S2, No murmur, No edema  Respiratory: Decreased basilar breath sounds R>L; ; Interval improvement Normal Respiratory Effort; respiratory support with NC  Abdomen/GI: Soft, non-tender, non-distended, no guarding, no rebound, normal bowel sounds  Extremities: No cyanosis, No clubbing, No edema  Neuro: AAOx3, CNII-XII grossly intact, non-focal  Skin: Clean, Dry and Intact    CULTURES:      Telemetry, personally reviewed

## 2024-06-03 NOTE — PROGRESS NOTE ADULT - SUBJECTIVE AND OBJECTIVE BOX
Patient is a 79y old  Male who presents with a chief complaint of Cough/SOB/Fevers (24 May 2024 22:41)      HPI:  79M with PMH of Lung Ca s/p chemoradiation (last dose of both as per patient in April 2024) and HLD presents with SOB/Cough/Fevers and chills for 2-3 days. onset of cough and MCDONALD 2-3 days prior to admission and since then it has been progressively worsening. Started to have chills 1-2 days prior to admission and these symptoms remains persistent and getting worse. Denies syncope, chest pain, nausea, vomiting, abdominal pain/discomfort. Diarrhea reports (1 episode daily) and non-bloody. Cough productive with yellow and white sputum production. Denies having diagnosis of COPD. Former tobacco use quit many years ago.   In the ER Tmax 100.4F, -108, BP 14/83, RR 16-28, SpO2 85% on RA. WBC 9.49, Hgb 11.1, Na 134 otherwise CMP grossly unremarkable. Lactate 1.9, ProBNP 657, Troponin 116.63->149.07.   CTA- No pulmonary embolism through the level of the segmental pulmonary   arteries. Evaluation of some subsegmental pulmonary arteries is limited. Left lower lobe nodular opacity likely known lung cancer. Left parahilar   radiation changes. Nonspecific diffuse groundglass throughout the right lung, differential   for which includes pneumonitis or infection.      MEDICATIONS  (STANDING):  albuterol    0.083% 2.5 milliGRAM(s) Nebulizer every 6 hours  albuterol    90 MICROgram(s) HFA Inhaler 1 Puff(s) Inhalation every 4 hours  aspirin enteric coated 81 milliGRAM(s) Oral daily  atorvastatin 40 milliGRAM(s) Oral at bedtime  budesonide 160 MICROgram(s)/formoterol 4.5 MICROgram(s) Inhaler 2 Puff(s) Inhalation two times a day  clotrimazole Lozenge 1 Lozenge Oral <User Schedule>  dextrose 10% Bolus 125 milliLiter(s) IV Bolus once  dextrose 5%. 1000 milliLiter(s) (100 mL/Hr) IV Continuous <Continuous>  dextrose 5%. 1000 milliLiter(s) (50 mL/Hr) IV Continuous <Continuous>  dextrose 50% Injectable 25 Gram(s) IV Push once  dextrose 50% Injectable 12.5 Gram(s) IV Push once  enoxaparin Injectable 40 milliGRAM(s) SubCutaneous every 24 hours  glucagon  Injectable 1 milliGRAM(s) IntraMuscular once  insulin glargine Injectable (LANTUS) 5 Unit(s) SubCutaneous every morning  insulin lispro (ADMELOG) corrective regimen sliding scale   SubCutaneous three times a day before meals  insulin lispro (ADMELOG) corrective regimen sliding scale   SubCutaneous at bedtime  insulin lispro Injectable (ADMELOG) 2 Unit(s) SubCutaneous three times a day before meals  lisinopril 10 milliGRAM(s) Oral daily  methylPREDNISolone sodium succinate Injectable 40 milliGRAM(s) IV Push every 8 hours  metoprolol succinate ER 25 milliGRAM(s) Oral daily  tiotropium 2.5 MICROgram(s) Inhaler 2 Puff(s) Inhalation daily    MEDICATIONS  (PRN):  acetaminophen     Tablet .. 650 milliGRAM(s) Oral every 6 hours PRN Temp greater or equal to 38C (100.4F), Mild Pain (1 - 3)  aluminum hydroxide/magnesium hydroxide/simethicone Suspension 30 milliLiter(s) Oral every 4 hours PRN Dyspepsia  benzonatate 100 milliGRAM(s) Oral every 8 hours PRN Cough  dextrose Oral Gel 15 Gram(s) Oral once PRN Blood Glucose LESS THAN 70 milliGRAM(s)/deciliter  guaiFENesin Oral Liquid (Sugar-Free) 100 milliGRAM(s) Oral every 6 hours PRN Cough  melatonin 3 milliGRAM(s) Oral at bedtime PRN Insomnia  ondansetron Injectable 4 milliGRAM(s) IV Push every 8 hours PRN Nausea and/or Vomiting        Vital Signs Last 24 Hrs  T(C): 36.8 (02 Jun 2024 20:44), Max: 36.8 (02 Jun 2024 20:44)  T(F): 98.2 (02 Jun 2024 20:44), Max: 98.2 (02 Jun 2024 20:44)  HR: 79 (03 Jun 2024 07:26) (77 - 83)  BP: 118/56 (02 Jun 2024 20:44) (118/56 - 118/62)  BP(mean): --  RR: 18 (02 Jun 2024 20:44) (18 - 18)  SpO2: 97% (03 Jun 2024 01:35) (94% - 97%)    Parameters below as of 03 Jun 2024 07:26  Patient On (Oxygen Delivery Method): nasal cannula, 4lpm            PHYSICAL EXAM  General Appearance: cooperative, no acute distress,   HEENT: PERRL, conjunctiva clear, EOM's intact, non injected pharynx, no exudate, TM   normal  Neck: Supple, , no adenopathy, thyroid: not enlarged, no carotid bruit or JVD  Back: Symmetric, no  tenderness,no soft tissue tenderness  Lungs: bibasilar crackles persist, no wheeze  Heart: Regular rate and rhythm, S1, S2 normal, no murmur, rub or gallop  Abdomen: Soft, non-tender, bowel sounds active , no hepatosplenomegaly  Extremities: no cyanosis or edema, no joint swelling  Skin: Skin color, texture normal, no rashes   Neurologic: Alert and oriented X3 , cranial nerves intact, sensory and motor normal,    ECG:    LABS:                          9.4    6.29  )-----------( 236      ( 24 May 2024 06:41 )             27.7     05-24    135  |  103  |  12  ----------------------------<  242<H>  4.2   |  24  |  0.69    Ca    8.6      24 May 2024 06:41  Phos  3.4     05-24  Mg     1.8     05-24    TPro  6.3  /  Alb  2.1<L>  /  TBili  0.5  /  DBili  x   /  AST  18  /  ALT  26  /  AlkPhos  97  05-24              Urinalysis Basic - ( 24 May 2024 06:41 )    Color: x / Appearance: x / SG: x / pH: x  Gluc: 242 mg/dL / Ketone: x  / Bili: x / Urobili: x   Blood: x / Protein: x / Nitrite: x   Leuk Esterase: x / RBC: x / WBC x   Sq Epi: x / Non Sq Epi: x / Bacteria: x      ABG - ( 29 May 2024 14:51 )  pH, Arterial: 7.43  pH, Blood: x     /  pCO2: 36    /  pO2: 83    / HCO3: 24    / Base Excess: -0.1  /  SaO2: 98                    RADIOLOGY & ADDITIONAL STUDIES:  < from: CT Angio Chest PE Protocol w/ IV Cont (05.23.24 @ 11:34) >  IMPRESSION:    No pulmonary embolism through the level of the segmental pulmonary   arteries. Evaluation of some subsegmental pulmonary arteries is limited.    Left lower lobe nodular opacity likely known lung cancer. Left parahilar   radiation changes.    Nonspecific diffuse groundglass throughout the right lung, differential   for which includes pneumonitis or infection.    < end of copied text >    Unchanged right greater than left groundglass opacities, which are   nonspecific, at least some of which may be related to radiation therapy.   Differential diagnostic considerations also but is not limited to include   superimposed inflammatory etiologies.    Unchanged nodular opacities including a dominant 2.6 cm left lower lobe   nodular opacity which may correspond to the patient's history of lung   cancer.    Comparison with prior older chest CTs are recommended for complete   evaluation of the above findings and if images are made available, an   addendum can be issued.    Minimal left pleural fluid with interval decrease in size since May 23,   2024.

## 2024-06-04 NOTE — PROGRESS NOTE ADULT - ASSESSMENT
79M with PMH of Lung Ca s/p chemoradiation (last dose of both as per patient in April 2024) and HLD presents with SOB/Cough/Fevers and chills for 2-3 days. onset of cough and MCDONALD 2-3 days prior to admission and since then it has been progressively worsening. Started to have chills 1-2 days prior to admission and these symptoms remains persistent and getting worse. Denies syncope, chest pain, nausea, vomiting, abdominal pain/discomfort. Diarrhea reports (1 episode daily) and non-bloody. Cough productive with yellow and white sputum production. Denies having diagnosis of COPD. Former tobacco use quit many years ago.   In the ER Tmax 100.4F, -108, BP 14/83, RR 16-28, SpO2 85% on RA. WBC 9.49, Hgb 11.1, Na 134 otherwise CMP grossly unremarkable. Lactate 1.9, ProBNP 657, Troponin 116.63->149.07.   Left lower lobe nodular opacity likely known lung cancer. Left parahilar   radiation changes. Nonspecific diffuse groundglass throughout the right lung, differential   for which includes pneumonitis or infection. repeat ct chest 5/28 noted    Lung ca s/p chemo RT  suspected pneumonitis  no definite consolidation / pneumonia  adequate oxygenation but hypoxemia on exertion  No hemoptysis or discolored sputum  complete course of antibiotics  slow steroid taper  complete course of steroids and antibiotics  walk test for re eval hypoxemia on exertion  mobilize OOb and re eval oxygenation on exertion  ABG - ( 29 May 2024 14:51 )  pH, Arterial: 7.43  pH, Blood: x     /  pCO2: 36    /  pO2: 83    / HCO3: 24    / Base Excess: -0.1  /  SaO2: 98      Large A-a gradient despite antibiotics and steroids  etiology likley pneumonitis with persistent infiltrates and basilar crackles on exam  will need home o2 set up  switch to po ceftin /lower dose of cefepime IV  Doubt PE with negative CTA and alternate dx available with pneumonitis seen on ct scan  concern wit persistent infiltrates despite treatment  not a good candidate for lung bx  conservative measures appropriate  discussed with hospitalist  all recent films personally reviewed  data discussed with hospitalist  palliative care team noted  re eval for hypoxemia on exertion with walk test while using O2 4liters nc today pls

## 2024-06-04 NOTE — PROGRESS NOTE ADULT - SUBJECTIVE AND OBJECTIVE BOX
Patient is a 79y old  Male who presents with a chief complaint of Cough/SOB/Fevers (24 May 2024 22:41)      HPI:  79M with PMH of Lung Ca s/p chemoradiation (last dose of both as per patient in April 2024) and HLD presents with SOB/Cough/Fevers and chills for 2-3 days. onset of cough and MCDONALD 2-3 days prior to admission and since then it has been progressively worsening. Started to have chills 1-2 days prior to admission and these symptoms remains persistent and getting worse. Denies syncope, chest pain, nausea, vomiting, abdominal pain/discomfort. Diarrhea reports (1 episode daily) and non-bloody. Cough productive with yellow and white sputum production. Denies having diagnosis of COPD. Former tobacco use quit many years ago.   In the ER Tmax 100.4F, -108, BP 14/83, RR 16-28, SpO2 85% on RA. WBC 9.49, Hgb 11.1, Na 134 otherwise CMP grossly unremarkable. Lactate 1.9, ProBNP 657, Troponin 116.63->149.07.   CTA- No pulmonary embolism through the level of the segmental pulmonary   arteries. Evaluation of some subsegmental pulmonary arteries is limited. Left lower lobe nodular opacity likely known lung cancer. Left parahilar   radiation changes. Nonspecific diffuse groundglass throughout the right lung, differential   for which includes pneumonitis or infection.      MEDICATIONS  (STANDING):  albuterol    0.083% 2.5 milliGRAM(s) Nebulizer every 6 hours  albuterol    90 MICROgram(s) HFA Inhaler 1 Puff(s) Inhalation every 4 hours  aspirin enteric coated 81 milliGRAM(s) Oral daily  atorvastatin 40 milliGRAM(s) Oral at bedtime  budesonide 160 MICROgram(s)/formoterol 4.5 MICROgram(s) Inhaler 2 Puff(s) Inhalation two times a day  clotrimazole Lozenge 1 Lozenge Oral <User Schedule>  dextrose 10% Bolus 125 milliLiter(s) IV Bolus once  dextrose 5%. 1000 milliLiter(s) (100 mL/Hr) IV Continuous <Continuous>  dextrose 5%. 1000 milliLiter(s) (50 mL/Hr) IV Continuous <Continuous>  dextrose 50% Injectable 25 Gram(s) IV Push once  dextrose 50% Injectable 12.5 Gram(s) IV Push once  enoxaparin Injectable 40 milliGRAM(s) SubCutaneous every 24 hours  glucagon  Injectable 1 milliGRAM(s) IntraMuscular once  insulin glargine Injectable (LANTUS) 5 Unit(s) SubCutaneous every morning  insulin lispro (ADMELOG) corrective regimen sliding scale   SubCutaneous three times a day before meals  insulin lispro (ADMELOG) corrective regimen sliding scale   SubCutaneous at bedtime  insulin lispro Injectable (ADMELOG) 2 Unit(s) SubCutaneous three times a day before meals  lisinopril 10 milliGRAM(s) Oral daily  methylPREDNISolone sodium succinate Injectable 40 milliGRAM(s) IV Push every 8 hours  metoprolol succinate ER 25 milliGRAM(s) Oral daily  tiotropium 2.5 MICROgram(s) Inhaler 2 Puff(s) Inhalation daily    MEDICATIONS  (PRN):  acetaminophen     Tablet .. 650 milliGRAM(s) Oral every 6 hours PRN Temp greater or equal to 38C (100.4F), Mild Pain (1 - 3)  aluminum hydroxide/magnesium hydroxide/simethicone Suspension 30 milliLiter(s) Oral every 4 hours PRN Dyspepsia  benzonatate 100 milliGRAM(s) Oral every 8 hours PRN Cough  dextrose Oral Gel 15 Gram(s) Oral once PRN Blood Glucose LESS THAN 70 milliGRAM(s)/deciliter  guaiFENesin Oral Liquid (Sugar-Free) 100 milliGRAM(s) Oral every 6 hours PRN Cough  melatonin 3 milliGRAM(s) Oral at bedtime PRN Insomnia  ondansetron Injectable 4 milliGRAM(s) IV Push every 8 hours PRN Nausea and/or Vomiting        Vital Signs Last 24 Hrs  T(C): 36.6 (03 Jun 2024 20:22), Max: 36.9 (03 Jun 2024 17:33)  T(F): 97.9 (03 Jun 2024 20:22), Max: 98.4 (03 Jun 2024 17:33)  HR: 76 (04 Jun 2024 01:23) (72 - 80)  BP: 134/62 (03 Jun 2024 20:22) (129/72 - 134/62)  BP(mean): --  RR: 18 (03 Jun 2024 20:22) (17 - 18)  SpO2: 94% (04 Jun 2024 01:23) (94% - 96%)    Parameters below as of 04 Jun 2024 01:23  Patient On (Oxygen Delivery Method): nasal cannula, 3L          PHYSICAL EXAM  General Appearance: cooperative, no acute distress,   HEENT: PERRL, conjunctiva clear, EOM's intact, non injected pharynx, no exudate, TM   normal  Neck: Supple, , no adenopathy, thyroid: not enlarged, no carotid bruit or JVD  Back: Symmetric, no  tenderness,no soft tissue tenderness  Lungs: bibasilar crackles persist, no wheeze  Heart: Regular rate and rhythm, S1, S2 normal, no murmur, rub or gallop  Abdomen: Soft, non-tender, bowel sounds active , no hepatosplenomegaly  Extremities: no cyanosis or edema, no joint swelling  Skin: Skin color, texture normal, no rashes   Neurologic: Alert and oriented X3 , cranial nerves intact, sensory and motor normal,    ECG:    LABS:                        11.1   18.29 )-----------( 250      ( 04 Jun 2024 06:47 )             31.7   06-03    126<L>  |  93<L>  |  28<H>  ----------------------------<  170<H>  4.4   |  28  |  0.46<L>    Ca    8.7      03 Jun 2024 06:54                            9.4    6.29  )-----------( 236      ( 24 May 2024 06:41 )             27.7     05-24    135  |  103  |  12  ----------------------------<  242<H>  4.2   |  24  |  0.69    Ca    8.6      24 May 2024 06:41  Phos  3.4     05-24  Mg     1.8     05-24    TPro  6.3  /  Alb  2.1<L>  /  TBili  0.5  /  DBili  x   /  AST  18  /  ALT  26  /  AlkPhos  97  05-24              Urinalysis Basic - ( 24 May 2024 06:41 )    Color: x / Appearance: x / SG: x / pH: x  Gluc: 242 mg/dL / Ketone: x  / Bili: x / Urobili: x   Blood: x / Protein: x / Nitrite: x   Leuk Esterase: x / RBC: x / WBC x   Sq Epi: x / Non Sq Epi: x / Bacteria: x      ABG - ( 29 May 2024 14:51 )  pH, Arterial: 7.43  pH, Blood: x     /  pCO2: 36    /  pO2: 83    / HCO3: 24    / Base Excess: -0.1  /  SaO2: 98                    RADIOLOGY & ADDITIONAL STUDIES:  < from: CT Angio Chest PE Protocol w/ IV Cont (05.23.24 @ 11:34) >  IMPRESSION:    No pulmonary embolism through the level of the segmental pulmonary   arteries. Evaluation of some subsegmental pulmonary arteries is limited.    Left lower lobe nodular opacity likely known lung cancer. Left parahilar   radiation changes.    Nonspecific diffuse groundglass throughout the right lung, differential   for which includes pneumonitis or infection.    < end of copied text >    Unchanged right greater than left groundglass opacities, which are   nonspecific, at least some of which may be related to radiation therapy.   Differential diagnostic considerations also but is not limited to include   superimposed inflammatory etiologies.    Unchanged nodular opacities including a dominant 2.6 cm left lower lobe   nodular opacity which may correspond to the patient's history of lung   cancer.    Comparison with prior older chest CTs are recommended for complete   evaluation of the above findings and if images are made available, an   addendum can be issued.    Minimal left pleural fluid with interval decrease in size since May 23,   2024.

## 2024-06-04 NOTE — PROGRESS NOTE ADULT - ASSESSMENT
79M with PMH of Lung Ca s/p chemoradiation (last dose of both as per patient in April 2024) and HLD presents with SOB/Cough/Fevers and chills for 2-3 days. onset of cough and MCDONALD 2-3 days prior to admission and since then it has been progressively worsening. Started to have chills 1-2 days prior to admission and these symptoms remains persistent and getting worse. Denies syncope, chest pain, nausea, vomiting, abdominal pain/discomfort. Diarrhea reports (1 episode daily) and non-bloody. Cough productive with yellow and white sputum production. Denies having diagnosis of COPD.    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  Associated elevated troponin secondary to supply/demand ischemia due to above  Lung cancer s/p chemoradiation (last dose reported april 2024).   -Troponins elevated in 100s without significant rise .Telemetry x 24 hours->no events->DC tele  - Cardiology consult.   -CXRAY and CT noted  -Empiric anitbiotics. Completed Cefepime/Doxy   -TTE unremarkable  -EKG NSR without ST changes.   -Lactate WNL  -Cultures NGTD  -O2 supplementation  -Albuterol/Symbicort  -IV Solumedrol. Down titrate as tolerated  -Repeat CXRAY (5/27) with vascular congestion (likely fluid retention from steroids). IV Lasix 20mg x 1 ordered on 5/28. Continue to monitor.   -Hem/Onc consult/recs  -Repeat CT findings noted. Additional dose of lasix ordered on 5/29. Continue to monitor->subsequently hyponatremia. hold any further diuresis at this time  -5/28: no real change in o2 requirements despite antibiotics, steroids, diuresis. No PE on admission CTA. Large A-a gradient on ABG (5/29). 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 (titrate insulin accordingly with anticipated hyperglycemia augmented by steroids).   6/4- Continue IV steroids at this time. wean steroids to  40mg qd , continue to wean o2 as tolerated . Home o2 eval    HLD  -Continue statin    Hyponatremia  Continue to monitor   Encourage oral intake   Decreased this AM  Start IV fluids NS @ 75ml/hr   repeat BMP       Prediabetes with Hyperglycemia due to steroids  -Augmented by administration of solumedrol  -A1c 6.4%  -BGM/ISS/Lantus. Titrate accordingly.     DVT Prophylaxis: Lovenox subq  Disposition: Still medically active.  Pending improvement in hypoxia.     DME Medical Necessity     Commode  3:1 commode;  pt will require a 3:1 commode as pt incapable of ambulating to bathroom as patient is confined to a single room without a bathroom.      Transport Wheelchair  This patient has a mobility limitation that significantly impairs the patients ability to participate in one or more MRADL's such as: toileting, eating, dressing and bathing in customary locations in the home.  Patient's home provides adequate access between rooms for the use of the manual wheelchair.  The wheelchair will significantly improve patient's ability to participate in MRADL's and will be used on a regular basis in the home. The patient's mobility limitation cannot be resolved by the use of a walker or cane.  This patient does not have the upper extremity function to safely propel the manual wheelchair.  Patient has a 24/7 care giver in the home who is willing to propel the wheelchair at any given time. The patient has agreed to use the wheelchair on a daily basis in the home. Diagnosis: Lung Cancer, Pneumonitis, weakness, FTT, COPD

## 2024-06-04 NOTE — PROGRESS NOTE ADULT - SUBJECTIVE AND OBJECTIVE BOX
HOSPITALIST ATTENDING PROGRESS NOTE    Chart and meds reviewed.      Subjective: Patient seen and examined. Patient reports that he continues to feel better. Will wean steroids to 40mg Daily. Home o2 eval today. Possible DC in AM       Additional results/Imaging, I have personally reviewed:    LABS:                            11.1   18.29 )-----------( 250      ( 04 Jun 2024 06:47 )             31.7     06-04    123<L>  |  91<L>  |  23  ----------------------------<  118<H>  4.5   |  27  |  0.39<L>    Ca    8.3<L>      04 Jun 2024 06:47              Urinalysis Basic - ( 04 Jun 2024 06:47 )    Color: x / Appearance: x / SG: x / pH: x  Gluc: 118 mg/dL / Ketone: x  / Bili: x / Urobili: x   Blood: x / Protein: x / Nitrite: x   Leuk Esterase: x / RBC: x / WBC x   Sq Epi: x / Non Sq Epi: x / Bacteria: x              All other systems reviewed and found to be negative with the exception of what has been described above.    MEDICATIONS  (STANDING):  albuterol    0.083% 2.5 milliGRAM(s) Nebulizer every 6 hours  albuterol    90 MICROgram(s) HFA Inhaler 1 Puff(s) Inhalation every 4 hours  aspirin enteric coated 81 milliGRAM(s) Oral daily  atorvastatin 40 milliGRAM(s) Oral at bedtime  budesonide 160 MICROgram(s)/formoterol 4.5 MICROgram(s) Inhaler 2 Puff(s) Inhalation two times a day  clotrimazole Lozenge 1 Lozenge Oral <User Schedule>  dextrose 10% Bolus 125 milliLiter(s) IV Bolus once  dextrose 5%. 1000 milliLiter(s) (100 mL/Hr) IV Continuous <Continuous>  dextrose 5%. 1000 milliLiter(s) (50 mL/Hr) IV Continuous <Continuous>  dextrose 50% Injectable 25 Gram(s) IV Push once  dextrose 50% Injectable 12.5 Gram(s) IV Push once  enoxaparin Injectable 40 milliGRAM(s) SubCutaneous every 24 hours  glucagon  Injectable 1 milliGRAM(s) IntraMuscular once  insulin glargine Injectable (LANTUS) 5 Unit(s) SubCutaneous every morning  insulin lispro (ADMELOG) corrective regimen sliding scale   SubCutaneous three times a day before meals  insulin lispro (ADMELOG) corrective regimen sliding scale   SubCutaneous at bedtime  insulin lispro Injectable (ADMELOG) 2 Unit(s) SubCutaneous three times a day before meals  lisinopril 10 milliGRAM(s) Oral daily  methylPREDNISolone sodium succinate Injectable 40 milliGRAM(s) IV Push daily  metoprolol succinate ER 25 milliGRAM(s) Oral daily  sodium chloride 0.9%. 1000 milliLiter(s) (75 mL/Hr) IV Continuous <Continuous>  tiotropium 2.5 MICROgram(s) Inhaler 2 Puff(s) Inhalation daily    MEDICATIONS  (PRN):  acetaminophen     Tablet .. 650 milliGRAM(s) Oral every 6 hours PRN Temp greater or equal to 38C (100.4F), Mild Pain (1 - 3)  aluminum hydroxide/magnesium hydroxide/simethicone Suspension 30 milliLiter(s) Oral every 4 hours PRN Dyspepsia  benzonatate 100 milliGRAM(s) Oral every 8 hours PRN Cough  dextrose Oral Gel 15 Gram(s) Oral once PRN Blood Glucose LESS THAN 70 milliGRAM(s)/deciliter  guaiFENesin Oral Liquid (Sugar-Free) 100 milliGRAM(s) Oral every 6 hours PRN Cough  melatonin 3 milliGRAM(s) Oral at bedtime PRN Insomnia  ondansetron Injectable 4 milliGRAM(s) IV Push every 8 hours PRN Nausea and/or Vomiting      VITALS:  T(F): 97.5 (06-04-24 @ 08:34), Max: 98.4 (06-03-24 @ 17:33)  HR: 92 (06-04-24 @ 09:45) (72 - 92)  BP: 127/57 (06-04-24 @ 09:45) (127/57 - 134/62)  RR: 17 (06-04-24 @ 09:45) (17 - 18)  SpO2: 93% (06-04-24 @ 09:45) (93% - 96%)  Wt(kg): --    I&O's Summary      CAPILLARY BLOOD GLUCOSE      POCT Blood Glucose.: 281 mg/dL (04 Jun 2024 11:37)  POCT Blood Glucose.: 117 mg/dL (04 Jun 2024 08:03)  POCT Blood Glucose.: 217 mg/dL (03 Jun 2024 22:14)  POCT Blood Glucose.: 186 mg/dL (03 Jun 2024 16:43)      PHYSICAL EXAM:  General: AAOx3; NAD: Frail  ENT: Moist Mucous Membranes; No Injury  Neck: Non-tender; No JVD  CVS: RRR, S1&S2, No murmur, No edema  Respiratory: Decreased basilar breath sounds R>L; ; Interval improvement Normal Respiratory Effort; respiratory support with NC  Abdomen/GI: Soft, non-tender, non-distended, no guarding, no rebound, normal bowel sounds  Extremities: No cyanosis, No clubbing, No edema  Neuro: AAOx3, CNII-XII grossly intact, non-focal  Skin: Clean, Dry and Intact    CULTURES:      Telemetry, personally reviewed

## 2024-06-05 NOTE — PROGRESS NOTE ADULT - SUBJECTIVE AND OBJECTIVE BOX
Patient is a 79y old  Male who presents with a chief complaint of Cough/SOB/Fevers (24 May 2024 22:41)      HPI:  79M with PMH of Lung Ca s/p chemoradiation (last dose of both as per patient in April 2024) and HLD presents with SOB/Cough/Fevers and chills for 2-3 days. onset of cough and MCDONALD 2-3 days prior to admission and since then it has been progressively worsening. Started to have chills 1-2 days prior to admission and these symptoms remains persistent and getting worse. Denies syncope, chest pain, nausea, vomiting, abdominal pain/discomfort. Diarrhea reports (1 episode daily) and non-bloody. Cough productive with yellow and white sputum production. Denies having diagnosis of COPD. Former tobacco use quit many years ago.   In the ER Tmax 100.4F, -108, BP 14/83, RR 16-28, SpO2 85% on RA. WBC 9.49, Hgb 11.1, Na 134 otherwise CMP grossly unremarkable. Lactate 1.9, ProBNP 657, Troponin 116.63->149.07.   CTA- No pulmonary embolism through the level of the segmental pulmonary   arteries. Evaluation of some subsegmental pulmonary arteries is limited. Left lower lobe nodular opacity likely known lung cancer. Left parahilar   radiation changes. Nonspecific diffuse groundglass throughout the right lung, differential   for which includes pneumonitis or infection.      MEDICATIONS  (STANDING):  albuterol    0.083% 2.5 milliGRAM(s) Nebulizer every 6 hours  albuterol    90 MICROgram(s) HFA Inhaler 1 Puff(s) Inhalation every 4 hours  aspirin enteric coated 81 milliGRAM(s) Oral daily  atorvastatin 40 milliGRAM(s) Oral at bedtime  budesonide 160 MICROgram(s)/formoterol 4.5 MICROgram(s) Inhaler 2 Puff(s) Inhalation two times a day  clotrimazole Lozenge 1 Lozenge Oral <User Schedule>  dextrose 10% Bolus 125 milliLiter(s) IV Bolus once  dextrose 5%. 1000 milliLiter(s) (50 mL/Hr) IV Continuous <Continuous>  dextrose 5%. 1000 milliLiter(s) (100 mL/Hr) IV Continuous <Continuous>  dextrose 50% Injectable 25 Gram(s) IV Push once  dextrose 50% Injectable 12.5 Gram(s) IV Push once  enoxaparin Injectable 40 milliGRAM(s) SubCutaneous every 24 hours  glucagon  Injectable 1 milliGRAM(s) IntraMuscular once  insulin glargine Injectable (LANTUS) 5 Unit(s) SubCutaneous every morning  insulin lispro (ADMELOG) corrective regimen sliding scale   SubCutaneous three times a day before meals  insulin lispro (ADMELOG) corrective regimen sliding scale   SubCutaneous at bedtime  insulin lispro Injectable (ADMELOG) 2 Unit(s) SubCutaneous three times a day before meals  lisinopril 10 milliGRAM(s) Oral daily  methylPREDNISolone sodium succinate Injectable 40 milliGRAM(s) IV Push daily  metoprolol succinate ER 25 milliGRAM(s) Oral daily  sodium chloride 0.9%. 1000 milliLiter(s) (75 mL/Hr) IV Continuous <Continuous>  tiotropium 2.5 MICROgram(s) Inhaler 2 Puff(s) Inhalation daily    MEDICATIONS  (PRN):  acetaminophen     Tablet .. 650 milliGRAM(s) Oral every 6 hours PRN Temp greater or equal to 38C (100.4F), Mild Pain (1 - 3)  aluminum hydroxide/magnesium hydroxide/simethicone Suspension 30 milliLiter(s) Oral every 4 hours PRN Dyspepsia  benzonatate 100 milliGRAM(s) Oral every 8 hours PRN Cough  dextrose Oral Gel 15 Gram(s) Oral once PRN Blood Glucose LESS THAN 70 milliGRAM(s)/deciliter  guaiFENesin Oral Liquid (Sugar-Free) 100 milliGRAM(s) Oral every 6 hours PRN Cough  melatonin 3 milliGRAM(s) Oral at bedtime PRN Insomnia  ondansetron Injectable 4 milliGRAM(s) IV Push every 8 hours PRN Nausea and/or Vomiting        Vital Signs Last 24 Hrs  T(C): 36.4 (04 Jun 2024 20:45), Max: 36.4 (04 Jun 2024 08:34)  T(F): 97.5 (04 Jun 2024 20:45), Max: 97.5 (04 Jun 2024 08:34)  HR: 70 (04 Jun 2024 20:45) (70 - 98)  BP: 122/63 (04 Jun 2024 20:45) (122/63 - 131/66)  BP(mean): --  RR: 18 (04 Jun 2024 20:45) (17 - 18)  SpO2: 100% (04 Jun 2024 20:45) (92% - 100%)    Parameters below as of 04 Jun 2024 20:45  Patient On (Oxygen Delivery Method): nasal cannula  O2 Flow (L/min): 4        PHYSICAL EXAM  General Appearance: cooperative, no acute distress,   HEENT: PERRL, conjunctiva clear, EOM's intact, non injected pharynx, no exudate, TM   normal  Neck: Supple, , no adenopathy, thyroid: not enlarged, no carotid bruit or JVD  Back: Symmetric, no  tenderness,no soft tissue tenderness  Lungs: bibasilar crackles persist, no wheeze  Heart: Regular rate and rhythm, S1, S2 normal, no murmur, rub or gallop  Abdomen: Soft, non-tender, bowel sounds active , no hepatosplenomegaly  Extremities: no cyanosis or edema, no joint swelling  Skin: Skin color, texture normal, no rashes   Neurologic: Alert and oriented X3 , cranial nerves intact, sensory and motor normal,    ECG:    LABS:                        11.1   18.29 )-----------( 250      ( 04 Jun 2024 06:47 )             31.7   06-03    126<L>  |  93<L>  |  28<H>  ----------------------------<  170<H>  4.4   |  28  |  0.46<L>    Ca    8.7      03 Jun 2024 06:54                            9.4    6.29  )-----------( 236      ( 24 May 2024 06:41 )             27.7     05-24    135  |  103  |  12  ----------------------------<  242<H>  4.2   |  24  |  0.69    Ca    8.6      24 May 2024 06:41  Phos  3.4     05-24  Mg     1.8     05-24    TPro  6.3  /  Alb  2.1<L>  /  TBili  0.5  /  DBili  x   /  AST  18  /  ALT  26  /  AlkPhos  97  05-24              Urinalysis Basic - ( 24 May 2024 06:41 )    Color: x / Appearance: x / SG: x / pH: x  Gluc: 242 mg/dL / Ketone: x  / Bili: x / Urobili: x   Blood: x / Protein: x / Nitrite: x   Leuk Esterase: x / RBC: x / WBC x   Sq Epi: x / Non Sq Epi: x / Bacteria: x      ABG - ( 29 May 2024 14:51 )  pH, Arterial: 7.43  pH, Blood: x     /  pCO2: 36    /  pO2: 83    / HCO3: 24    / Base Excess: -0.1  /  SaO2: 98          < from: Xray Chest 1 View- PORTABLE-Routine (Xray Chest 1 View- PORTABLE-Routine .) (06.04.24 @ 08:56) >  PROCEDURE DATE:  06/04/2024          INTERPRETATION:  AP chest on June 4, 2024 at 8:46 AM. Patient is short of   breath.    Heart magnified by technique.    On May 27 there are perihilar infiltrates.    On present examination left-sided infiltrates are stable. There are   increasing right upper perihilar infiltrates.    IMPRESSION: Stable left-sided infiltrates. Increasing right upper   perihilar infiltrate.    < end of copied text >            RADIOLOGY & ADDITIONAL STUDIES:  < from: CT Angio Chest PE Protocol w/ IV Cont (05.23.24 @ 11:34) >  IMPRESSION:    No pulmonary embolism through the level of the segmental pulmonary   arteries. Evaluation of some subsegmental pulmonary arteries is limited.    Left lower lobe nodular opacity likely known lung cancer. Left parahilar   radiation changes.    Nonspecific diffuse groundglass throughout the right lung, differential   for which includes pneumonitis or infection.    < end of copied text >    Unchanged right greater than left groundglass opacities, which are   nonspecific, at least some of which may be related to radiation therapy.   Differential diagnostic considerations also but is not limited to include   superimposed inflammatory etiologies.    Unchanged nodular opacities including a dominant 2.6 cm left lower lobe   nodular opacity which may correspond to the patient's history of lung   cancer.    Comparison with prior older chest CTs are recommended for complete   evaluation of the above findings and if images are made available, an   addendum can be issued.    Minimal left pleural fluid with interval decrease in size since May 23,   2024.

## 2024-06-05 NOTE — PROGRESS NOTE ADULT - ASSESSMENT
79M with PMH of Lung Ca s/p chemoradiation (last dose of both as per patient in April 2024) and HLD presents with SOB/Cough/Fevers and chills for 2-3 days. onset of cough and MCDONALD 2-3 days prior to admission and since then it has been progressively worsening. Started to have chills 1-2 days prior to admission and these symptoms remains persistent and getting worse. Denies syncope, chest pain, nausea, vomiting, abdominal pain/discomfort. Diarrhea reports (1 episode daily) and non-bloody. Cough productive with yellow and white sputum production. Denies having diagnosis of COPD.    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  Associated elevated troponin secondary to supply/demand ischemia due to above  Lung cancer s/p chemoradiation (last dose reported april 2024).   -Troponins elevated in 100s without significant rise .Telemetry x 24 hours->no events->DC tele  - Cardiology consult.   -CXRAY and CT noted  -Empiric anitbiotics. Completed Cefepime/Doxy   -TTE unremarkable  -EKG NSR without ST changes.   -Lactate WNL  -Cultures NGTD  -O2 supplementation  -Albuterol/Symbicort  -IV Solumedrol. Down titrate as tolerated  -Repeat CXRAY (5/27) with vascular congestion (likely fluid retention from steroids). IV Lasix 20mg x 1 ordered on 5/28. Continue to monitor.   -Hem/Onc consult/recs  -Repeat CT findings noted. Additional dose of lasix ordered on 5/29. Continue to monitor->subsequently hyponatremia. hold any further diuresis at this time  -5/28: no real change in o2 requirements despite antibiotics, steroids, diuresis. No PE on admission CTA. Large A-a gradient on ABG (5/29). 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 (titrate insulin accordingly with anticipated hyperglycemia augmented by steroids).   6/4- Continue IV steroids at this time. wean steroids to  40mg qd , continue to wean o2 as tolerated . Home o2 eval  6/5- Home o2 has been delivered to home, Continue Steroids 40mg QD. Lasix 20mg PO started today, continue for 3 days. Possible DC in AM     HLD  -Continue statin    Hyponatremia  Continue to monitor   DC IV fluids  repeat BMP       Prediabetes with Hyperglycemia due to steroids  -Augmented by administration of solumedrol  -A1c 6.4%  -BGM/ISS/Lantus. Titrate accordingly.     DVT Prophylaxis: Lovenox subq  Disposition: Possible DC in AM if O2 stable. Family declines rehab. Reports good home support. Home O2 has been delivered     DME Medical Necessity     Commode  3:1 commode;  pt will require a 3:1 commode as pt incapable of ambulating to bathroom as patient is confined to a single room without a bathroom.      Transport Wheelchair  This patient has a mobility limitation that significantly impairs the patients ability to participate in one or more MRADL's such as: toileting, eating, dressing and bathing in customary locations in the home.  Patient's home provides adequate access between rooms for the use of the manual wheelchair.  The wheelchair will significantly improve patient's ability to participate in MRADL's and will be used on a regular basis in the home. The patient's mobility limitation cannot be resolved by the use of a walker or cane.  This patient does not have the upper extremity function to safely propel the manual wheelchair.  Patient has a 24/7 care giver in the home who is willing to propel the wheelchair at any given time. The patient has agreed to use the wheelchair on a daily basis in the home. Diagnosis: Lung Cancer, Pneumonitis, weakness, FTT, COPD

## 2024-06-05 NOTE — PROGRESS NOTE ADULT - SUBJECTIVE AND OBJECTIVE BOX
HOSPITALIST ATTENDING PROGRESS NOTE    Chart and meds reviewed.      HPI: 79M with PMH of Lung Ca s/p chemoradiation (last dose of both as per patient in April 2024) and HLD presents with SOB/Cough/Fevers and chills for 2-3 days. onset of cough and MCDONALD 2-3 days prior to admission and since then it has been progressively worsening. Started to have chills 1-2 days prior to admission and these symptoms remains persistent and getting worse. Denies syncope, chest pain, nausea, vomiting, abdominal pain/discomfort. Diarrhea reports (1 episode daily) and non-bloody. Cough productive with yellow and white sputum production. Denies having diagnosis of COPD. Former tobacco use quit many years ago.       Subjective: Patient seen and examined. Resting comfortably, Seen by pulm started on gentle oral diuresis x 3 days. Patient feel better today. Steroids weaned to 40mg IV daily yesterday. Sodium improved to 124. Patient reports that he drank a lot of water yesterday.      Additional results/Imaging, I have personally reviewed:    LABS:                            11.7   13.55 )-----------( 266      ( 05 Jun 2024 08:38 )             33.7     06-05    124<L>  |  91<L>  |  16  ----------------------------<  125<H>  4.5   |  27  |  0.55    Ca    8.4<L>      05 Jun 2024 09:40              Urinalysis Basic - ( 05 Jun 2024 09:40 )    Color: x / Appearance: x / SG: x / pH: x  Gluc: 125 mg/dL / Ketone: x  / Bili: x / Urobili: x   Blood: x / Protein: x / Nitrite: x   Leuk Esterase: x / RBC: x / WBC x   Sq Epi: x / Non Sq Epi: x / Bacteria: x              All other systems reviewed and found to be negative with the exception of what has been described above.    MEDICATIONS  (STANDING):  albuterol    0.083% 2.5 milliGRAM(s) Nebulizer every 6 hours  albuterol    90 MICROgram(s) HFA Inhaler 1 Puff(s) Inhalation every 4 hours  aspirin enteric coated 81 milliGRAM(s) Oral daily  atorvastatin 40 milliGRAM(s) Oral at bedtime  budesonide 160 MICROgram(s)/formoterol 4.5 MICROgram(s) Inhaler 2 Puff(s) Inhalation two times a day  clotrimazole Lozenge 1 Lozenge Oral <User Schedule>  dextrose 10% Bolus 125 milliLiter(s) IV Bolus once  dextrose 5%. 1000 milliLiter(s) (50 mL/Hr) IV Continuous <Continuous>  dextrose 5%. 1000 milliLiter(s) (100 mL/Hr) IV Continuous <Continuous>  dextrose 50% Injectable 25 Gram(s) IV Push once  dextrose 50% Injectable 12.5 Gram(s) IV Push once  enoxaparin Injectable 40 milliGRAM(s) SubCutaneous every 24 hours  furosemide    Tablet 20 milliGRAM(s) Oral daily  glucagon  Injectable 1 milliGRAM(s) IntraMuscular once  insulin glargine Injectable (LANTUS) 5 Unit(s) SubCutaneous every morning  insulin lispro (ADMELOG) corrective regimen sliding scale   SubCutaneous three times a day before meals  insulin lispro (ADMELOG) corrective regimen sliding scale   SubCutaneous at bedtime  insulin lispro Injectable (ADMELOG) 2 Unit(s) SubCutaneous three times a day before meals  lisinopril 10 milliGRAM(s) Oral daily  methylPREDNISolone sodium succinate Injectable 40 milliGRAM(s) IV Push daily  metoprolol succinate ER 25 milliGRAM(s) Oral daily  potassium chloride    Tablet ER 10 milliEquivalent(s) Oral daily  tiotropium 2.5 MICROgram(s) Inhaler 2 Puff(s) Inhalation daily    MEDICATIONS  (PRN):  acetaminophen     Tablet .. 650 milliGRAM(s) Oral every 6 hours PRN Temp greater or equal to 38C (100.4F), Mild Pain (1 - 3)  aluminum hydroxide/magnesium hydroxide/simethicone Suspension 30 milliLiter(s) Oral every 4 hours PRN Dyspepsia  benzonatate 100 milliGRAM(s) Oral every 8 hours PRN Cough  dextrose Oral Gel 15 Gram(s) Oral once PRN Blood Glucose LESS THAN 70 milliGRAM(s)/deciliter  guaiFENesin Oral Liquid (Sugar-Free) 100 milliGRAM(s) Oral every 6 hours PRN Cough  melatonin 3 milliGRAM(s) Oral at bedtime PRN Insomnia  ondansetron Injectable 4 milliGRAM(s) IV Push every 8 hours PRN Nausea and/or Vomiting      VITALS:  T(F): 97.4 (06-05-24 @ 08:26), Max: 97.5 (06-04-24 @ 20:45)  HR: 72 (06-05-24 @ 08:26) (70 - 72)  BP: 118/68 (06-05-24 @ 08:26) (118/68 - 122/63)  RR: 19 (06-05-24 @ 08:26) (18 - 19)  SpO2: 97% (06-05-24 @ 08:26) (97% - 100%)  Wt(kg): --    I&O's Summary    04 Jun 2024 07:01  -  05 Jun 2024 07:00  --------------------------------------------------------  IN: 0 mL / OUT: 400 mL / NET: -400 mL        CAPILLARY BLOOD GLUCOSE      POCT Blood Glucose.: 214 mg/dL (05 Jun 2024 12:09)  POCT Blood Glucose.: 129 mg/dL (05 Jun 2024 08:02)  POCT Blood Glucose.: 168 mg/dL (04 Jun 2024 21:11)  POCT Blood Glucose.: 119 mg/dL (04 Jun 2024 16:51)      PHYSICAL EXAM:  General: AAOx3; NAD: Frail  ENT: Moist Mucous Membranes; No Injury  Neck: Non-tender; No JVD  CVS: RRR, S1&S2, No murmur, No edema  Respiratory: Decreased basilar breath sounds R>L; ; Interval improvement Normal Respiratory Effort; respiratory support with NC  Abdomen/GI: Soft, non-tender, non-distended, no guarding, no rebound, normal bowel sounds  Extremities: No cyanosis, No clubbing, No edema  Neuro: AAOx3, CNII-XII grossly intact, non-focal  Skin: Clean, Dry and Intact      CULTURES:      Telemetry, personally reviewed

## 2024-06-05 NOTE — PROGRESS NOTE ADULT - ASSESSMENT
79M with PMH of Lung Ca s/p chemoradiation (last dose of both as per patient in April 2024) and HLD presents with SOB/Cough/Fevers and chills for 2-3 days. onset of cough and MCDONALD 2-3 days prior to admission and since then it has been progressively worsening. Started to have chills 1-2 days prior to admission and these symptoms remains persistent and getting worse. Denies syncope, chest pain, nausea, vomiting, abdominal pain/discomfort. Diarrhea reports (1 episode daily) and non-bloody. Cough productive with yellow and white sputum production. Denies having diagnosis of COPD. Former tobacco use quit many years ago.   In the ER Tmax 100.4F, -108, BP 14/83, RR 16-28, SpO2 85% on RA. WBC 9.49, Hgb 11.1, Na 134 otherwise CMP grossly unremarkable. Lactate 1.9, ProBNP 657, Troponin 116.63->149.07.   Left lower lobe nodular opacity likely known lung cancer. Left parahilar   radiation changes. Nonspecific diffuse groundglass throughout the right lung, differential   for which includes pneumonitis or infection. repeat ct chest 5/28 noted    Lung ca s/p chemo RT  suspected pneumonitis  no definite consolidation / pneumonia  adequate oxygenation but hypoxemia on exertion  No hemoptysis or discolored sputum  complete course of antibiotics  slow steroid taper  complete course of steroids and antibiotics  walk test for re eval hypoxemia on exertion  mobilize OOb and re eval oxygenation on exertion  ABG - ( 29 May 2024 14:51 )  pH, Arterial: 7.43  pH, Blood: x     /  pCO2: 36    /  pO2: 83    / HCO3: 24    / Base Excess: -0.1  /  SaO2: 98      Large A-a gradient despite antibiotics and steroids  etiology likley pneumonitis with persistent infiltrates and basilar crackles on exam  will need home o2 set up  switch to po ceftin /lower dose of cefepime IV  Doubt PE with negative CTA and alternate dx available with pneumonitis seen on ct scan  concern wit persistent infiltrates despite treatment  not a good candidate for lung bx  conservative measures appropriate  discussed with hospitalist  all recent films personally reviewed  data discussed with hospitalist  palliative care team noted  re eval for hypoxemia on exertion with walk test while using O2 4liters nc today pls    6/5/24  worsened cxr with increased perihilar infiltrates  CHF maybe cont factor  gentle diuresis suggested  fu films and cont with steroids slow taper suggested  will need Home o2 set up  echo if not recently done

## 2024-06-06 NOTE — PROGRESS NOTE ADULT - ASSESSMENT
79M with PMH of Lung Ca s/p chemoradiation (last dose of both as per patient in April 2024) and HLD presents with SOB/Cough/Fevers and chills for 2-3 days. onset of cough and MCDONALD 2-3 days prior to admission and since then it has been progressively worsening. Started to have chills 1-2 days prior to admission and these symptoms remains persistent and getting worse. Denies syncope, chest pain, nausea, vomiting, abdominal pain/discomfort. Diarrhea reports (1 episode daily) and non-bloody. Cough productive with yellow and white sputum production. Denies having diagnosis of COPD. Former tobacco use quit many years ago.   In the ER Tmax 100.4F, -108, BP 14/83, RR 16-28, SpO2 85% on RA. WBC 9.49, Hgb 11.1, Na 134 otherwise CMP grossly unremarkable. Lactate 1.9, ProBNP 657, Troponin 116.63->149.07.   Left lower lobe nodular opacity likely known lung cancer. Left parahilar   radiation changes. Nonspecific diffuse groundglass throughout the right lung, differential   for which includes pneumonitis or infection. repeat ct chest 5/28 noted    Lung ca s/p chemo RT  suspected pneumonitis  no definite consolidation / pneumonia  adequate oxygenation but hypoxemia on exertion  No hemoptysis or discolored sputum  complete course of antibiotics  slow steroid taper  complete course of steroids and antibiotics  walk test for re eval hypoxemia on exertion  mobilize OOb and re eval oxygenation on exertion  ABG - ( 29 May 2024 14:51 )  pH, Arterial: 7.43  pH, Blood: x     /  pCO2: 36    /  pO2: 83    / HCO3: 24    / Base Excess: -0.1  /  SaO2: 98      Large A-a gradient despite antibiotics and steroids  etiology likley pneumonitis with persistent infiltrates and basilar crackles on exam  will need home o2 set up  switch to po ceftin /lower dose of cefepime IV  Doubt PE with negative CTA and alternate dx available with pneumonitis seen on ct scan  concern wit persistent infiltrates despite treatment  not a good candidate for lung bx  conservative measures appropriate  discussed with hospitalist  all recent films personally reviewed  data discussed with hospitalist  palliative care team noted  re eval for hypoxemia on exertion with walk test while using O2 4liters nc today pls    6/5/24  worsened cxr with increased perihilar infiltrates  CHF maybe cont factor  gentle diuresis suggested  fu films and cont with steroids slow taper suggested  will need Home o2 set up  echo if not recently done    6/6  IVF stopped  gentle diuresis for mild CHF based on exam and cxr  Worsened hyponatremia and suggest renal consult before planned discharge since worsened Na 122 now  cont with steroids  fu cxr after diuresis in am requested  thanks

## 2024-06-06 NOTE — PROGRESS NOTE ADULT - SUBJECTIVE AND OBJECTIVE BOX
Patient is a 79y old  Male who presents with a chief complaint of Cough/SOB/Fevers (24 May 2024 22:41)      HPI:  79M with PMH of Lung Ca s/p chemoradiation (last dose of both as per patient in April 2024) and HLD presents with SOB/Cough/Fevers and chills for 2-3 days. onset of cough and MCDONALD 2-3 days prior to admission and since then it has been progressively worsening. Started to have chills 1-2 days prior to admission and these symptoms remains persistent and getting worse. Denies syncope, chest pain, nausea, vomiting, abdominal pain/discomfort. Diarrhea reports (1 episode daily) and non-bloody. Cough productive with yellow and white sputum production. Denies having diagnosis of COPD. Former tobacco use quit many years ago.   In the ER Tmax 100.4F, -108, BP 14/83, RR 16-28, SpO2 85% on RA. WBC 9.49, Hgb 11.1, Na 134 otherwise CMP grossly unremarkable. Lactate 1.9, ProBNP 657, Troponin 116.63->149.07.   CTA- No pulmonary embolism through the level of the segmental pulmonary   arteries. Evaluation of some subsegmental pulmonary arteries is limited. Left lower lobe nodular opacity likely known lung cancer. Left parahilar   radiation changes. Nonspecific diffuse groundglass throughout the right lung, differential   for which includes pneumonitis or infection.    MEDICATIONS  (STANDING):  albuterol    0.083% 2.5 milliGRAM(s) Nebulizer every 6 hours  albuterol    90 MICROgram(s) HFA Inhaler 1 Puff(s) Inhalation every 4 hours  aspirin enteric coated 81 milliGRAM(s) Oral daily  atorvastatin 40 milliGRAM(s) Oral at bedtime  budesonide 160 MICROgram(s)/formoterol 4.5 MICROgram(s) Inhaler 2 Puff(s) Inhalation two times a day  clotrimazole Lozenge 1 Lozenge Oral <User Schedule>  dextrose 10% Bolus 125 milliLiter(s) IV Bolus once  dextrose 5%. 1000 milliLiter(s) (50 mL/Hr) IV Continuous <Continuous>  dextrose 5%. 1000 milliLiter(s) (100 mL/Hr) IV Continuous <Continuous>  dextrose 50% Injectable 25 Gram(s) IV Push once  dextrose 50% Injectable 12.5 Gram(s) IV Push once  enoxaparin Injectable 40 milliGRAM(s) SubCutaneous every 24 hours  furosemide    Tablet 20 milliGRAM(s) Oral daily  glucagon  Injectable 1 milliGRAM(s) IntraMuscular once  insulin glargine Injectable (LANTUS) 5 Unit(s) SubCutaneous every morning  insulin lispro (ADMELOG) corrective regimen sliding scale   SubCutaneous three times a day before meals  insulin lispro (ADMELOG) corrective regimen sliding scale   SubCutaneous at bedtime  insulin lispro Injectable (ADMELOG) 2 Unit(s) SubCutaneous three times a day before meals  lisinopril 10 milliGRAM(s) Oral daily  methylPREDNISolone sodium succinate Injectable 40 milliGRAM(s) IV Push daily  metoprolol succinate ER 25 milliGRAM(s) Oral daily  potassium chloride    Tablet ER 10 milliEquivalent(s) Oral daily  tiotropium 2.5 MICROgram(s) Inhaler 2 Puff(s) Inhalation daily      MEDICATIONS  (PRN):  acetaminophen     Tablet .. 650 milliGRAM(s) Oral every 6 hours PRN Temp greater or equal to 38C (100.4F), Mild Pain (1 - 3)  aluminum hydroxide/magnesium hydroxide/simethicone Suspension 30 milliLiter(s) Oral every 4 hours PRN Dyspepsia  benzonatate 100 milliGRAM(s) Oral every 8 hours PRN Cough  dextrose Oral Gel 15 Gram(s) Oral once PRN Blood Glucose LESS THAN 70 milliGRAM(s)/deciliter  guaiFENesin Oral Liquid (Sugar-Free) 100 milliGRAM(s) Oral every 6 hours PRN Cough  melatonin 3 milliGRAM(s) Oral at bedtime PRN Insomnia  ondansetron Injectable 4 milliGRAM(s) IV Push every 8 hours PRN Nausea and/or Vomiting        Vital Signs Last 24 Hrs  T(C): 36.3 (05 Jun 2024 21:03), Max: 36.3 (05 Jun 2024 08:26)  T(F): 97.4 (05 Jun 2024 21:03), Max: 97.4 (05 Jun 2024 08:26)  HR: 81 (06 Jun 2024 01:52) (72 - 96)  BP: 112/54 (05 Jun 2024 21:03) (112/54 - 118/68)  BP(mean): --  RR: 18 (05 Jun 2024 21:03) (18 - 19)  SpO2: 94% (06 Jun 2024 01:52) (92% - 100%)    Parameters below as of 06 Jun 2024 01:52  Patient On (Oxygen Delivery Method): nasal cannula, 5L          PHYSICAL EXAM  General Appearance: cooperative, no acute distress,   HEENT: PERRL, conjunctiva clear, EOM's intact, non injected pharynx, no exudate, TM   normal  Neck: Supple, , no adenopathy, thyroid: not enlarged, no carotid bruit or JVD  Back: Symmetric, no  tenderness,no soft tissue tenderness  Lungs: bibasilar crackles persist, no wheeze  Heart: Regular rate and rhythm, S1, S2 normal, no murmur, rub or gallop  Abdomen: Soft, non-tender, bowel sounds active , no hepatosplenomegaly  Extremities: no cyanosis or edema, no joint swelling  Skin: Skin color, texture normal, no rashes   Neurologic: Alert and oriented X3 , cranial nerves intact, sensory and motor normal,    ECG:    LABS:                          10.8   19.78 )-----------( 228      ( 06 Jun 2024 06:18 )             30.5   06-06    122<L>  |  91<L>  |  25<H>  ----------------------------<  105<H>  3.9   |  25  |  0.36<L>    Ca    7.7<L>      06 Jun 2024 06:18                            11.1   18.29 )-----------( 250      ( 04 Jun 2024 06:47 )             31.7   06-03    126<L>  |  93<L>  |  28<H>  ----------------------------<  170<H>  4.4   |  28  |  0.46<L>    Ca    8.7      03 Jun 2024 06:54                            9.4    6.29  )-----------( 236      ( 24 May 2024 06:41 )             27.7     05-24    135  |  103  |  12  ----------------------------<  242<H>  4.2   |  24  |  0.69    Ca    8.6      24 May 2024 06:41  Phos  3.4     05-24  Mg     1.8     05-24    TPro  6.3  /  Alb  2.1<L>  /  TBili  0.5  /  DBili  x   /  AST  18  /  ALT  26  /  AlkPhos  97  05-24              Urinalysis Basic - ( 24 May 2024 06:41 )    Color: x / Appearance: x / SG: x / pH: x  Gluc: 242 mg/dL / Ketone: x  / Bili: x / Urobili: x   Blood: x / Protein: x / Nitrite: x   Leuk Esterase: x / RBC: x / WBC x   Sq Epi: x / Non Sq Epi: x / Bacteria: x      ABG - ( 29 May 2024 14:51 )  pH, Arterial: 7.43  pH, Blood: x     /  pCO2: 36    /  pO2: 83    / HCO3: 24    / Base Excess: -0.1  /  SaO2: 98          < from: Xray Chest 1 View- PORTABLE-Routine (Xray Chest 1 View- PORTABLE-Routine .) (06.04.24 @ 08:56) >  PROCEDURE DATE:  06/04/2024          INTERPRETATION:  AP chest on June 4, 2024 at 8:46 AM. Patient is short of   breath.    Heart magnified by technique.    On May 27 there are perihilar infiltrates.    On present examination left-sided infiltrates are stable. There are   increasing right upper perihilar infiltrates.    IMPRESSION: Stable left-sided infiltrates. Increasing right upper   perihilar infiltrate.    < end of copied text >            RADIOLOGY & ADDITIONAL STUDIES:  < from: CT Angio Chest PE Protocol w/ IV Cont (05.23.24 @ 11:34) >  IMPRESSION:    No pulmonary embolism through the level of the segmental pulmonary   arteries. Evaluation of some subsegmental pulmonary arteries is limited.    Left lower lobe nodular opacity likely known lung cancer. Left parahilar   radiation changes.    Nonspecific diffuse groundglass throughout the right lung, differential   for which includes pneumonitis or infection.    < end of copied text >    Unchanged right greater than left groundglass opacities, which are   nonspecific, at least some of which may be related to radiation therapy.   Differential diagnostic considerations also but is not limited to include   superimposed inflammatory etiologies.    Unchanged nodular opacities including a dominant 2.6 cm left lower lobe   nodular opacity which may correspond to the patient's history of lung   cancer.    Comparison with prior older chest CTs are recommended for complete   evaluation of the above findings and if images are made available, an   addendum can be issued.    Minimal left pleural fluid with interval decrease in size since May 23,   2024.

## 2024-06-06 NOTE — PROGRESS NOTE ADULT - SUBJECTIVE AND OBJECTIVE BOX
HOSPITALIST ATTENDING PROGRESS NOTE    Chart and meds reviewed.      HPI: 79M with PMH of Lung Ca s/p chemoradiation (last dose of both as per patient in April 2024) and HLD presents with SOB/Cough/Fevers and chills for 2-3 days. onset of cough and MCDONALD 2-3 days prior to admission and since then it has been progressively worsening. Started to have chills 1-2 days prior to admission and these symptoms remains persistent and getting worse. Denies syncope, chest pain, nausea, vomiting, abdominal pain/discomfort. Diarrhea reports (1 episode daily) and non-bloody. Cough productive with yellow and white sputum production. Denies having diagnosis of COPD. Former tobacco use quit many years ago.       Subjective: Patient seen and examined. Resting comfortably, Seen by pulm started on gentle oral diuresis x 3 days. Patient feel better today. Steroids weaned to 40mg IV daily yesterday. Sodium improved to 124. Patient reports that he drank a lot of water yesterday.      Additional results/Imaging, I have personally reviewed:    LABS:                            11.7   13.55 )-----------( 266      ( 05 Jun 2024 08:38 )             33.7     06-05    124<L>  |  91<L>  |  16  ----------------------------<  125<H>  4.5   |  27  |  0.55    Ca    8.4<L>      05 Jun 2024 09:40              Urinalysis Basic - ( 05 Jun 2024 09:40 )    Color: x / Appearance: x / SG: x / pH: x  Gluc: 125 mg/dL / Ketone: x  / Bili: x / Urobili: x   Blood: x / Protein: x / Nitrite: x   Leuk Esterase: x / RBC: x / WBC x   Sq Epi: x / Non Sq Epi: x / Bacteria: x              All other systems reviewed and found to be negative with the exception of what has been described above.    MEDICATIONS  (STANDING):  albuterol    0.083% 2.5 milliGRAM(s) Nebulizer every 6 hours  albuterol    90 MICROgram(s) HFA Inhaler 1 Puff(s) Inhalation every 4 hours  aspirin enteric coated 81 milliGRAM(s) Oral daily  atorvastatin 40 milliGRAM(s) Oral at bedtime  budesonide 160 MICROgram(s)/formoterol 4.5 MICROgram(s) Inhaler 2 Puff(s) Inhalation two times a day  clotrimazole Lozenge 1 Lozenge Oral <User Schedule>  dextrose 10% Bolus 125 milliLiter(s) IV Bolus once  dextrose 5%. 1000 milliLiter(s) (50 mL/Hr) IV Continuous <Continuous>  dextrose 5%. 1000 milliLiter(s) (100 mL/Hr) IV Continuous <Continuous>  dextrose 50% Injectable 25 Gram(s) IV Push once  dextrose 50% Injectable 12.5 Gram(s) IV Push once  enoxaparin Injectable 40 milliGRAM(s) SubCutaneous every 24 hours  furosemide    Tablet 20 milliGRAM(s) Oral daily  glucagon  Injectable 1 milliGRAM(s) IntraMuscular once  insulin glargine Injectable (LANTUS) 5 Unit(s) SubCutaneous every morning  insulin lispro (ADMELOG) corrective regimen sliding scale   SubCutaneous three times a day before meals  insulin lispro (ADMELOG) corrective regimen sliding scale   SubCutaneous at bedtime  insulin lispro Injectable (ADMELOG) 2 Unit(s) SubCutaneous three times a day before meals  lisinopril 10 milliGRAM(s) Oral daily  methylPREDNISolone sodium succinate Injectable 40 milliGRAM(s) IV Push daily  metoprolol succinate ER 25 milliGRAM(s) Oral daily  potassium chloride    Tablet ER 10 milliEquivalent(s) Oral daily  tiotropium 2.5 MICROgram(s) Inhaler 2 Puff(s) Inhalation daily    MEDICATIONS  (PRN):  acetaminophen     Tablet .. 650 milliGRAM(s) Oral every 6 hours PRN Temp greater or equal to 38C (100.4F), Mild Pain (1 - 3)  aluminum hydroxide/magnesium hydroxide/simethicone Suspension 30 milliLiter(s) Oral every 4 hours PRN Dyspepsia  benzonatate 100 milliGRAM(s) Oral every 8 hours PRN Cough  dextrose Oral Gel 15 Gram(s) Oral once PRN Blood Glucose LESS THAN 70 milliGRAM(s)/deciliter  guaiFENesin Oral Liquid (Sugar-Free) 100 milliGRAM(s) Oral every 6 hours PRN Cough  melatonin 3 milliGRAM(s) Oral at bedtime PRN Insomnia  ondansetron Injectable 4 milliGRAM(s) IV Push every 8 hours PRN Nausea and/or Vomiting      VITALS:  T(F): 97.4 (06-05-24 @ 08:26), Max: 97.5 (06-04-24 @ 20:45)  HR: 72 (06-05-24 @ 08:26) (70 - 72)  BP: 118/68 (06-05-24 @ 08:26) (118/68 - 122/63)  RR: 19 (06-05-24 @ 08:26) (18 - 19)  SpO2: 97% (06-05-24 @ 08:26) (97% - 100%)  Wt(kg): --    I&O's Summary    04 Jun 2024 07:01  -  05 Jun 2024 07:00  --------------------------------------------------------  IN: 0 mL / OUT: 400 mL / NET: -400 mL        CAPILLARY BLOOD GLUCOSE      POCT Blood Glucose.: 214 mg/dL (05 Jun 2024 12:09)  POCT Blood Glucose.: 129 mg/dL (05 Jun 2024 08:02)  POCT Blood Glucose.: 168 mg/dL (04 Jun 2024 21:11)  POCT Blood Glucose.: 119 mg/dL (04 Jun 2024 16:51)      PHYSICAL EXAM:  General: AAOx3; NAD: Frail  ENT: Moist Mucous Membranes; No Injury  Neck: Non-tender; No JVD  CVS: RRR, S1&S2, No murmur, No edema  Respiratory: Decreased basilar breath sounds R>L; ; Interval improvement Normal Respiratory Effort; respiratory support with NC  Abdomen/GI: Soft, non-tender, non-distended, no guarding, no rebound, normal bowel sounds  Extremities: No cyanosis, No clubbing, No edema  Neuro: AAOx3, CNII-XII grossly intact, non-focal  Skin: Clean, Dry and Intact      CULTURES:      Telemetry, personally reviewed HOSPITALIST ATTENDING PROGRESS NOTE    Chart and meds reviewed.      HPI: 79M with PMH of Lung Ca s/p chemoradiation (last dose of both as per patient in April 2024) and HLD presents with SOB/Cough/Fevers and chills for 2-3 days. onset of cough and MCDONALD 2-3 days prior to admission and since then it has been progressively worsening. Started to have chills 1-2 days prior to admission and these symptoms remains persistent and getting worse. Denies syncope, chest pain, nausea, vomiting, abdominal pain/discomfort. Diarrhea reports (1 episode daily) and non-bloody. Cough productive with yellow and white sputum production. Denies having diagnosis of COPD. Former tobacco use quit many years ago.   -found to have Pna, started on Abx and steroids  -given Lasix for 4days for fluid overload with subsequent Hyponatremia     6.6: no dyspnea, c/o gen weakness         REVIEW OF SYSTEMS:    CONSTITUTIONAL: No weakness, No fevers or chills  ENT: No ear ache, No sorethroat  NECK: No pain, No stiffness  RESPIRATORY: No cough, No wheezing, No hemoptysis; No dyspnea  CARDIOVASCULAR: No chest pain, No palpitations  GASTROINTESTINAL: No abd pain, No nausea, No vomiting, No hematemesis, No diarrhea or constipation. No melena, No hematochezia.  GENITOURINARY: No dysuria, No  hematuria  NEUROLOGICAL: No diplopia, No paresthesia, No motor dysfunction  MUSCULOSKELETAL: No arthralgia, No myalgia  SKIN: No rashes, or lesions   PSYCH: no anxiety, no suicidal ideation    All other review of systems is negative unless indicated above    Vital Signs Last 24 Hrs  T(C): 36.3 (06 Jun 2024 07:27), Max: 36.3 (05 Jun 2024 21:03)  T(F): 97.4 (06 Jun 2024 07:27), Max: 97.4 (05 Jun 2024 21:03)  HR: 83 (06 Jun 2024 13:55) (77 - 96)  BP: 119/53 (06 Jun 2024 07:27) (112/54 - 119/53)  BP(mean): --  RR: 20 (06 Jun 2024 09:00) (18 - 20)  SpO2: 95% (06 Jun 2024 13:55) (85% - 100%)    Parameters below as of 06 Jun 2024 13:55  Patient On (Oxygen Delivery Method): nasal cannula, 5 lpm        PHYSICAL EXAM:    GENERAL: NAD  HEENT:  NC/AT, EOMI, PERRLA, No scleral icterus, Moist mucous membranes  NECK: Supple, No JVD  CNS:  Alert & Oriented X3, Motor Strength 5/5 B/L upper and lower extremities; DTRs 2+ intact   LUNG: decreased Breath sounds, Clear to auscultation bilaterally, No rales, No rhonchi, No wheezing  HEART: RRR; No murmurs, No rubs  ABDOMEN: +BS, ST/ND/NT  GENITOURINARY: Voiding, Bladder not distended  EXTREMITIES:  2+ Peripheral Pulses, No clubbing, No cyanosis, No tibial edema  MUSCULOSKELTAL: Joints normal ROM, No TTP, No effusion  SKIN: no rashes  RECTAL: deferred, not indicated  BREAST: deferred                          10.8   19.78 )-----------( 228      ( 06 Jun 2024 06:18 )             30.5     06-06    122<L>  |  91<L>  |  25<H>  ----------------------------<  105<H>  3.9   |  25  |  0.36<L>    Ca    7.7<L>      06 Jun 2024 06:18      Vancomycin levels:   Cultures:     MEDICATIONS  (STANDING):  albuterol    0.083% 2.5 milliGRAM(s) Nebulizer every 6 hours  albuterol    90 MICROgram(s) HFA Inhaler 1 Puff(s) Inhalation every 4 hours  aspirin enteric coated 81 milliGRAM(s) Oral daily  atorvastatin 40 milliGRAM(s) Oral at bedtime  budesonide 160 MICROgram(s)/formoterol 4.5 MICROgram(s) Inhaler 2 Puff(s) Inhalation two times a day  clotrimazole Lozenge 1 Lozenge Oral <User Schedule>  enoxaparin Injectable 40 milliGRAM(s) SubCutaneous every 24 hours  furosemide    Tablet 20 milliGRAM(s) Oral daily  glucagon  Injectable 1 milliGRAM(s) IntraMuscular once  insulin glargine Injectable (LANTUS) 5 Unit(s) SubCutaneous every morning  insulin lispro (ADMELOG) corrective regimen sliding scale   SubCutaneous at bedtime  insulin lispro (ADMELOG) corrective regimen sliding scale   SubCutaneous three times a day before meals  insulin lispro Injectable (ADMELOG) 2 Unit(s) SubCutaneous three times a day before meals  lisinopril 10 milliGRAM(s) Oral daily  methylPREDNISolone sodium succinate Injectable 40 milliGRAM(s) IV Push daily  metoprolol succinate ER 25 milliGRAM(s) Oral daily  potassium chloride    Tablet ER 10 milliEquivalent(s) Oral daily  tiotropium 2.5 MICROgram(s) Inhaler 2 Puff(s) Inhalation daily    MEDICATIONS  (PRN):  acetaminophen     Tablet .. 650 milliGRAM(s) Oral every 6 hours PRN Temp greater or equal to 38C (100.4F), Mild Pain (1 - 3)  aluminum hydroxide/magnesium hydroxide/simethicone Suspension 30 milliLiter(s) Oral every 4 hours PRN Dyspepsia  benzonatate 100 milliGRAM(s) Oral every 8 hours PRN Cough  dextrose Oral Gel 15 Gram(s) Oral once PRN Blood Glucose LESS THAN 70 milliGRAM(s)/deciliter  guaiFENesin Oral Liquid (Sugar-Free) 100 milliGRAM(s) Oral every 6 hours PRN Cough  melatonin 3 milliGRAM(s) Oral at bedtime PRN Insomnia  ondansetron Injectable 4 milliGRAM(s) IV Push every 8 hours PRN Nausea and/or Vomiting      all labs reviewed  all imaging reviewed    79M with PMH of Lung Ca s/p chemoradiation (last dose of both as per patient in April 2024) and HLD presents with SOB/Cough/Fevers and chills for 2-3 days. onset of cough and MCDONALD 2-3 days prior to admission and since then it has been progressively worsening. Started to have chills 1-2 days prior to admission and these symptoms remains persistent and getting worse. Denies syncope, chest pain, nausea, vomiting, abdominal pain/discomfort. Diarrhea reports (1 episode daily) and non-bloody. Cough productive with yellow and white sputum production. Denies having diagnosis of COPD.    1. Severe Sepsis Secondary to Pneumonia in Immunocompromised Lung Cancer Patient  Acute bronchospasm (likely with underlying COPD but denies ever having diagnosis of COPD)  Acute Respiratory Failure with Hypoxia due to above + Likely component of radiation pneumonitis.  Associated elevated troponin secondary to supply/demand ischemia due to above  Lung cancer s/p chemoradiation (last dose reported april 2024).   -Troponins elevated in 100s without significant rise .Telemetry x 24 hours->no events->DC tele  -TTE unremarkable    -Completed Cefepime/Doxy   -Cultures NGTD  -O2 supplementation  -Albuterol/Symbicort  -IV Solumedrol. will change to Prednisone taper     -Repeat CXRAY (5/27) with vascular congestion (likely fluid retention from steroids). IV Lasix 20mg x 1 ordered on 5/28. Continue to monitor.   -Hem/Onc consult/recs  -Repeat CT findings noted. Additional dose of lasix ordered on 5/29. 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 demeclocycline        Prediabetes with Hyperglycemia due to steroids  -Augmented by administration of solumedrol  -A1c 6.4%  -BGM/ISS/Lantus. Titrate accordingly.     DVT Prophylaxis: Lovenox     DME Medical Necessity     Commode  3:1 commode;  pt will require a 3:1 commode as pt incapable of ambulating to bathroom as patient is confined to a single room without a bathroom.      Transport Wheelchair  This patient has a mobility limitation that significantly impairs the patients ability to participate in one or more MRADL's such as: toileting, eating, dressing and bathing in customary locations in the home.  Patient's home provides adequate access between rooms for the use of the manual wheelchair.  The wheelchair will significantly improve patient's ability to participate in MRADL's and will be used on a regular basis in the home. The patient's mobility limitation cannot be resolved by the use of a walker or cane.  This patient does not have the upper extremity function to safely propel the manual wheelchair.  Patient has a 24/7 care giver in the home who is willing to propel the wheelchair at any given time. The patient has agreed to use the wheelchair on a daily basis in the home. Diagnosis: Lung Cancer, Pneumonitis, weakness, FTT, COPD

## 2024-06-06 NOTE — PROGRESS NOTE ADULT - ASSESSMENT
79M with PMH of Lung Ca s/p chemoradiation (last dose of both as per patient in April 2024) and HLD presents with SOB/Cough/Fevers and chills for 2-3 days. onset of cough and MCDONALD 2-3 days prior to admission and since then it has been progressively worsening. Started to have chills 1-2 days prior to admission and these symptoms remains persistent and getting worse. Denies syncope, chest pain, nausea, vomiting, abdominal pain/discomfort. Diarrhea reports (1 episode daily) and non-bloody. Cough productive with yellow and white sputum production. Denies having diagnosis of COPD.    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  Associated elevated troponin secondary to supply/demand ischemia due to above  Lung cancer s/p chemoradiation (last dose reported april 2024).   -Troponins elevated in 100s without significant rise .Telemetry x 24 hours->no events->DC tele  - Cardiology consult.   -CXRAY and CT noted  -Empiric anitbiotics. Completed Cefepime/Doxy   -TTE unremarkable  -EKG NSR without ST changes.   -Lactate WNL  -Cultures NGTD  -O2 supplementation  -Albuterol/Symbicort  -IV Solumedrol. Down titrate as tolerated  -Repeat CXRAY (5/27) with vascular congestion (likely fluid retention from steroids). IV Lasix 20mg x 1 ordered on 5/28. Continue to monitor.   -Hem/Onc consult/recs  -Repeat CT findings noted. Additional dose of lasix ordered on 5/29. Continue to monitor->subsequently hyponatremia. hold any further diuresis at this time  -5/28: no real change in o2 requirements despite antibiotics, steroids, diuresis. No PE on admission CTA. Large A-a gradient on ABG (5/29). 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 (titrate insulin accordingly with anticipated hyperglycemia augmented by steroids).   6/4- Continue IV steroids at this time. wean steroids to  40mg qd , continue to wean o2 as tolerated . Home o2 eval  6/5- Home o2 has been delivered to home, Continue Steroids 40mg QD. Lasix 20mg PO started today, continue for 3 days. 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 demeclocycline        Prediabetes with Hyperglycemia due to steroids  -Augmented by administration of solumedrol  -A1c 6.4%  -BGM/ISS/Lantus. Titrate accordingly.     DVT Prophylaxis: Lovenox subq  Disposition: Possible DC in AM if O2 stable. Family declines rehab. Reports good home support. Home O2 has been delivered     DME Medical Necessity     Commode  3:1 commode;  pt will require a 3:1 commode as pt incapable of ambulating to bathroom as patient is confined to a single room without a bathroom.      Transport Wheelchair  This patient has a mobility limitation that significantly impairs the patients ability to participate in one or more MRADL's such as: toileting, eating, dressing and bathing in customary locations in the home.  Patient's home provides adequate access between rooms for the use of the manual wheelchair.  The wheelchair will significantly improve patient's ability to participate in MRADL's and will be used on a regular basis in the home. The patient's mobility limitation cannot be resolved by the use of a walker or cane.  This patient does not have the upper extremity function to safely propel the manual wheelchair.  Patient has a 24/7 care giver in the home who is willing to propel the wheelchair at any given time. The patient has agreed to use the wheelchair on a daily basis in the home. Diagnosis: Lung Cancer, Pneumonitis, weakness, FTT, COPD

## 2024-06-07 NOTE — PROGRESS NOTE ADULT - SUBJECTIVE AND OBJECTIVE BOX
Patient is a 79y old  Male who presents with a chief complaint of Cough/SOB/Fevers (24 May 2024 22:41)      HPI:  79M with PMH of Lung Ca s/p chemoradiation (last dose of both as per patient in April 2024) and HLD presents with SOB/Cough/Fevers and chills for 2-3 days. onset of cough and MCDONALD 2-3 days prior to admission and since then it has been progressively worsening. Started to have chills 1-2 days prior to admission and these symptoms remains persistent and getting worse. Denies syncope, chest pain, nausea, vomiting, abdominal pain/discomfort. Diarrhea reports (1 episode daily) and non-bloody. Cough productive with yellow and white sputum production. Denies having diagnosis of COPD. Former tobacco use quit many years ago.   In the ER Tmax 100.4F, -108, BP 14/83, RR 16-28, SpO2 85% on RA. WBC 9.49, Hgb 11.1, Na 134 otherwise CMP grossly unremarkable. Lactate 1.9, ProBNP 657, Troponin 116.63->149.07.   CTA- No pulmonary embolism through the level of the segmental pulmonary   arteries. Evaluation of some subsegmental pulmonary arteries is limited. Left lower lobe nodular opacity likely known lung cancer. Left parahilar   radiation changes. Nonspecific diffuse groundglass throughout the right lung, differential   for which includes pneumonitis or infection.    MEDICATIONS  (STANDING):  albuterol    0.083% 2.5 milliGRAM(s) Nebulizer every 6 hours  albuterol    90 MICROgram(s) HFA Inhaler 1 Puff(s) Inhalation every 4 hours  aspirin enteric coated 81 milliGRAM(s) Oral daily  atorvastatin 40 milliGRAM(s) Oral at bedtime  budesonide 160 MICROgram(s)/formoterol 4.5 MICROgram(s) Inhaler 2 Puff(s) Inhalation two times a day  clotrimazole Lozenge 1 Lozenge Oral <User Schedule>  dextrose 10% Bolus 125 milliLiter(s) IV Bolus once  dextrose 5%. 1000 milliLiter(s) (50 mL/Hr) IV Continuous <Continuous>  dextrose 5%. 1000 milliLiter(s) (100 mL/Hr) IV Continuous <Continuous>  dextrose 50% Injectable 25 Gram(s) IV Push once  dextrose 50% Injectable 12.5 Gram(s) IV Push once  enoxaparin Injectable 40 milliGRAM(s) SubCutaneous every 24 hours  furosemide    Tablet 20 milliGRAM(s) Oral daily  glucagon  Injectable 1 milliGRAM(s) IntraMuscular once  insulin glargine Injectable (LANTUS) 5 Unit(s) SubCutaneous every morning  insulin lispro (ADMELOG) corrective regimen sliding scale   SubCutaneous three times a day before meals  insulin lispro (ADMELOG) corrective regimen sliding scale   SubCutaneous at bedtime  insulin lispro Injectable (ADMELOG) 2 Unit(s) SubCutaneous three times a day before meals  lisinopril 10 milliGRAM(s) Oral daily  methylPREDNISolone sodium succinate Injectable 40 milliGRAM(s) IV Push daily  metoprolol succinate ER 25 milliGRAM(s) Oral daily  potassium chloride    Tablet ER 10 milliEquivalent(s) Oral daily  tiotropium 2.5 MICROgram(s) Inhaler 2 Puff(s) Inhalation daily      MEDICATIONS  (PRN):  acetaminophen     Tablet .. 650 milliGRAM(s) Oral every 6 hours PRN Temp greater or equal to 38C (100.4F), Mild Pain (1 - 3)  aluminum hydroxide/magnesium hydroxide/simethicone Suspension 30 milliLiter(s) Oral every 4 hours PRN Dyspepsia  benzonatate 100 milliGRAM(s) Oral every 8 hours PRN Cough  dextrose Oral Gel 15 Gram(s) Oral once PRN Blood Glucose LESS THAN 70 milliGRAM(s)/deciliter  guaiFENesin Oral Liquid (Sugar-Free) 100 milliGRAM(s) Oral every 6 hours PRN Cough  melatonin 3 milliGRAM(s) Oral at bedtime PRN Insomnia  ondansetron Injectable 4 milliGRAM(s) IV Push every 8 hours PRN Nausea and/or Vomiting        Vital Signs Last 24 Hrs  T(C): 36.4 (06 Jun 2024 21:42), Max: 36.4 (06 Jun 2024 21:42)  T(F): 97.6 (06 Jun 2024 21:42), Max: 97.6 (06 Jun 2024 21:42)  HR: 78 (06 Jun 2024 21:42) (78 - 85)  BP: 118/55 (06 Jun 2024 21:42) (118/55 - 118/55)  BP(mean): --  RR: 18 (06 Jun 2024 21:42) (18 - 20)  SpO2: 95% (06 Jun 2024 21:42) (89% - 95%)    Parameters below as of 06 Jun 2024 21:42  Patient On (Oxygen Delivery Method): nasal cannula  O2 Flow (L/min): 5        PHYSICAL EXAM  General Appearance: cooperative, no acute distress,   HEENT: PERRL, conjunctiva clear, EOM's intact, non injected pharynx, no exudate, TM   normal  Neck: Supple, , no adenopathy, thyroid: not enlarged, no carotid bruit or JVD  Back: Symmetric, no  tenderness,no soft tissue tenderness  Lungs: bibasilar crackles persist, no wheeze  Heart: Regular rate and rhythm, S1, S2 normal, no murmur, rub or gallop  Abdomen: Soft, non-tender, bowel sounds active , no hepatosplenomegaly  Extremities: no cyanosis or edema, no joint swelling  Skin: Skin color, texture normal, no rashes   Neurologic: Alert and oriented X3 , cranial nerves intact, sensory and motor normal,    ECG:    LABS:                        10.8   19.78 )-----------( 228      ( 06 Jun 2024 06:18 )             30.5   06-06    122<L>  |  91<L>  |  25<H>  ----------------------------<  105<H>  3.9   |  25  |  0.36<L>    Ca    7.7<L>      06 Jun 2024 06:18                            10.8   19.78 )-----------( 228      ( 06 Jun 2024 06:18 )             30.5   06-06    122<L>  |  91<L>  |  25<H>  ----------------------------<  105<H>  3.9   |  25  |  0.36<L>    Ca    7.7<L>      06 Jun 2024 06:18                            11.1   18.29 )-----------( 250      ( 04 Jun 2024 06:47 )             31.7   06-03    126<L>  |  93<L>  |  28<H>  ----------------------------<  170<H>  4.4   |  28  |  0.46<L>    Ca    8.7      03 Jun 2024 06:54                            9.4    6.29  )-----------( 236      ( 24 May 2024 06:41 )             27.7     05-24    135  |  103  |  12  ----------------------------<  242<H>  4.2   |  24  |  0.69    Ca    8.6      24 May 2024 06:41  Phos  3.4     05-24  Mg     1.8     05-24    TPro  6.3  /  Alb  2.1<L>  /  TBili  0.5  /  DBili  x   /  AST  18  /  ALT  26  /  AlkPhos  97  05-24              Urinalysis Basic - ( 24 May 2024 06:41 )    Color: x / Appearance: x / SG: x / pH: x  Gluc: 242 mg/dL / Ketone: x  / Bili: x / Urobili: x   Blood: x / Protein: x / Nitrite: x   Leuk Esterase: x / RBC: x / WBC x   Sq Epi: x / Non Sq Epi: x / Bacteria: x      ABG - ( 29 May 2024 14:51 )  pH, Arterial: 7.43  pH, Blood: x     /  pCO2: 36    /  pO2: 83    / HCO3: 24    / Base Excess: -0.1  /  SaO2: 98          < from: Xray Chest 1 View- PORTABLE-Routine (Xray Chest 1 View- PORTABLE-Routine .) (06.04.24 @ 08:56) >  PROCEDURE DATE:  06/04/2024          INTERPRETATION:  AP chest on June 4, 2024 at 8:46 AM. Patient is short of   breath.    Heart magnified by technique.    On May 27 there are perihilar infiltrates.    On present examination left-sided infiltrates are stable. There are   increasing right upper perihilar infiltrates.    IMPRESSION: Stable left-sided infiltrates. Increasing right upper   perihilar infiltrate.    < end of copied text >            RADIOLOGY & ADDITIONAL STUDIES:  < from: CT Angio Chest PE Protocol w/ IV Cont (05.23.24 @ 11:34) >  IMPRESSION:    No pulmonary embolism through the level of the segmental pulmonary   arteries. Evaluation of some subsegmental pulmonary arteries is limited.    Left lower lobe nodular opacity likely known lung cancer. Left parahilar   radiation changes.    Nonspecific diffuse groundglass throughout the right lung, differential   for which includes pneumonitis or infection.    < end of copied text >    Unchanged right greater than left groundglass opacities, which are   nonspecific, at least some of which may be related to radiation therapy.   Differential diagnostic considerations also but is not limited to include   superimposed inflammatory etiologies.    Unchanged nodular opacities including a dominant 2.6 cm left lower lobe   nodular opacity which may correspond to the patient's history of lung   cancer.    Comparison with prior older chest CTs are recommended for complete   evaluation of the above findings and if images are made available, an   addendum can be issued.    Minimal left pleural fluid with interval decrease in size since May 23,   2024.

## 2024-06-07 NOTE — PROGRESS NOTE ADULT - SUBJECTIVE AND OBJECTIVE BOX
HOSPITALIST ATTENDING PROGRESS NOTE    Chart and meds reviewed.      HPI: 79M with PMH of Lung Ca s/p chemoradiation (last dose of both as per patient in April 2024) and HLD presents with SOB/Cough/Fevers and chills for 2-3 days. onset of cough and MCDONALD 2-3 days prior to admission and since then it has been progressively worsening. Started to have chills 1-2 days prior to admission and these symptoms remains persistent and getting worse. Denies syncope, chest pain, nausea, vomiting, abdominal pain/discomfort. Diarrhea reports (1 episode daily) and non-bloody. Cough productive with yellow and white sputum production. Denies having diagnosis of COPD. Former tobacco use quit many years ago.   -found to have Pna, started on Abx and steroids  -given Lasix for 4days for fluid overload with subsequent Hyponatremia     6.6: no dyspnea, c/o gen weakness   6.7: no dyspnea today, less cough, feels better       REVIEW OF SYSTEMS:    CONSTITUTIONAL: No weakness, No fevers or chills  ENT: No ear ache, No sorethroat  NECK: No pain, No stiffness  RESPIRATORY: No cough, No wheezing, No hemoptysis; No dyspnea  CARDIOVASCULAR: No chest pain, No palpitations  GASTROINTESTINAL: No abd pain, No nausea, No vomiting, No hematemesis, No diarrhea or constipation. No melena, No hematochezia.  GENITOURINARY: No dysuria, No  hematuria  NEUROLOGICAL: No diplopia, No paresthesia, No motor dysfunction  MUSCULOSKELETAL: No arthralgia, No myalgia  SKIN: No rashes, or lesions   PSYCH: no anxiety, no suicidal ideation    All other review of systems is negative unless indicated above    Vital Signs Last 24 Hrs  T(C): 36.3 (07 Jun 2024 08:12), Max: 36.4 (06 Jun 2024 21:42)  T(F): 97.3 (07 Jun 2024 08:12), Max: 97.6 (06 Jun 2024 21:42)  HR: 83 (07 Jun 2024 13:45) (68 - 88)  BP: 114/82 (07 Jun 2024 08:12) (114/82 - 118/55)  RR: 18 (07 Jun 2024 08:12) (18 - 18)  SpO2: 94% (07 Jun 2024 08:12) (91% - 95%)    Parameters below as of 07 Jun 2024 08:12  Patient On (Oxygen Delivery Method): nasal cannula  O2 Flow (L/min): 5      PHYSICAL EXAM:    GENERAL: NAD  HEENT:  NC/AT, EOMI, PERRLA, No scleral icterus, Moist mucous membranes  NECK: Supple, No JVD  CNS:  Alert & Oriented X3, Motor Strength 5/5 B/L upper and lower extremities; DTRs 2+ intact   LUNG: decreased Breath sounds, Clear to auscultation bilaterally, No rales, No rhonchi, No wheezing  HEART: RRR; No murmurs, No rubs  ABDOMEN: +BS, ST/ND/NT  GENITOURINARY: Voiding, Bladder not distended  EXTREMITIES:  2+ Peripheral Pulses, No clubbing, No cyanosis, No tibial edema  MUSCULOSKELTAL: Joints normal ROM, No TTP, No effusion  SKIN: no rashes  RECTAL: deferred, not indicated  BREAST: deferred                          10.8   19.78 )-----------( 228      ( 06 Jun 2024 06:18 )             30.5     06-06    122<L>  |  91<L>  |  25<H>  ----------------------------<  105<H>  3.9   |  25  |  0.36<L>    Ca    7.7<L>      06 Jun 2024 06:18      MEDICATIONS  (STANDING):  albuterol    0.083% 2.5 milliGRAM(s) Nebulizer every 6 hours  albuterol    90 MICROgram(s) HFA Inhaler 1 Puff(s) Inhalation every 4 hours  aspirin enteric coated 81 milliGRAM(s) Oral daily  atorvastatin 40 milliGRAM(s) Oral at bedtime  budesonide 160 MICROgram(s)/formoterol 4.5 MICROgram(s) Inhaler 2 Puff(s) Inhalation two times a day  clotrimazole Lozenge 1 Lozenge Oral <User Schedule>  enoxaparin Injectable 40 milliGRAM(s) SubCutaneous every 24 hours  glucagon  Injectable 1 milliGRAM(s) IntraMuscular once  insulin glargine Injectable (LANTUS) 5 Unit(s) SubCutaneous every morning  insulin lispro (ADMELOG) corrective regimen sliding scale   SubCutaneous three times a day before meals  insulin lispro (ADMELOG) corrective regimen sliding scale   SubCutaneous at bedtime  insulin lispro Injectable (ADMELOG) 2 Unit(s) SubCutaneous three times a day before meals  lisinopril 10 milliGRAM(s) Oral daily  metoprolol succinate ER 25 milliGRAM(s) Oral daily  predniSONE   Tablet 40 milliGRAM(s) Oral daily  tiotropium 2.5 MICROgram(s) Inhaler 2 Puff(s) Inhalation daily      all labs reviewed  all imaging reviewed    Labs:                        11.4   15.53 )-----------( 226      ( 07 Jun 2024 07:26 )             32.3     06-07    125<L>  |  91<L>  |  16  ----------------------------<  104<H>  3.7   |  28  |  0.27<L>    Ca    8.4<L>      07 Jun 2024 07:26             Cultures:   79M with PMH of Lung Ca s/p chemoradiation (last dose of both as per patient in April 2024) and HLD presents with SOB/Cough/Fevers and chills for 2-3 days. onset of cough and MCDONALD 2-3 days prior to admission and since then it has been progressively worsening. Started to have chills 1-2 days prior to admission and these symptoms remains persistent and getting worse. Denies syncope, chest pain, nausea, vomiting, abdominal pain/discomfort. Diarrhea reports (1 episode daily) and non-bloody. Cough productive with yellow and white sputum production. Denies having diagnosis of COPD.    1. Severe Sepsis Secondary to Pneumonia in Immunocompromised Lung Cancer Patient  Acute bronchospasm (likely with underlying COPD but denies ever having diagnosis of COPD)  Acute Respiratory Failure with Hypoxia due to above + Likely component of radiation pneumonitis.  Associated elevated troponin secondary to supply/demand ischemia due to above  Lung cancer s/p chemoradiation (last dose reported april 2024).   -Troponins elevated in 100s without significant rise .Telemetry x 24 hours->no events->DC tele  -TTE unremarkable    -Completed Cefepime/Doxy   -Cultures NGTD  -O2 supplementation  -Albuterol/Symbicort  -on Prednisone taper     -Repeat CXRAY (5/27) with vascular congestion (likely fluid retention from steroids). IV Lasix 20mg x 1 ordered on 5/28. Continue to monitor.   -Hem/Onc consult/recs  -Repeat CT findings noted. Additional dose of lasix ordered on 5/29. Continue to monitor->subsequently hyponatremia  will d/c Lasix  and use only prn due to hyponatremia     2. 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 Prediabetes with Hyperglycemia due to steroids  -Augmented by administration of solumedrol  -A1c 6.4%  -BGM/ISS/Lantus. Titrate accordingly.     DVT Prophylaxis: Lovenox     DME Medical Necessity     Commode  3:1 commode;  pt will require a 3:1 commode as pt incapable of ambulating to bathroom as patient is confined to a single room without a bathroom.      Transport Wheelchair  This patient has a mobility limitation that significantly impairs the patients ability to participate in one or more MRADL's such as: toileting, eating, dressing and bathing in customary locations in the home.  Patient's home provides adequate access between rooms for the use of the manual wheelchair.  The wheelchair will significantly improve patient's ability to participate in MRADL's and will be used on a regular basis in the home. The patient's mobility limitation cannot be resolved by the use of a walker or cane.  This patient does not have the upper extremity function to safely propel the manual wheelchair.  Patient has a 24/7 care giver in the home who is willing to propel the wheelchair at any given time. The patient has agreed to use the wheelchair on a daily basis in the home. Diagnosis: Lung Cancer, Pneumonitis, weakness, FTT, COPD        HOSPITALIST ATTENDING PROGRESS NOTE    Chart and meds reviewed.      HPI: 79M with PMH of Lung Ca s/p chemoradiation (last dose of both as per patient in April 2024) and HLD presents with SOB/Cough/Fevers and chills for 2-3 days. onset of cough and MCDONALD 2-3 days prior to admission and since then it has been progressively worsening. Started to have chills 1-2 days prior to admission and these symptoms remains persistent and getting worse. Denies syncope, chest pain, nausea, vomiting, abdominal pain/discomfort. Diarrhea reports (1 episode daily) and non-bloody. Cough productive with yellow and white sputum production. Denies having diagnosis of COPD. Former tobacco use quit many years ago.   -found to have Pna, started on Abx and steroids  -given Lasix for 4days for fluid overload with subsequent Hyponatremia     6.6: no dyspnea, c/o gen weakness   6.7: no dyspnea today, less cough, feels better       REVIEW OF SYSTEMS:    CONSTITUTIONAL: No weakness, No fevers or chills  ENT: No ear ache, No sorethroat  NECK: No pain, No stiffness  RESPIRATORY: No cough, No wheezing, No hemoptysis; No dyspnea  CARDIOVASCULAR: No chest pain, No palpitations  GASTROINTESTINAL: No abd pain, No nausea, No vomiting, No hematemesis, No diarrhea or constipation. No melena, No hematochezia.  GENITOURINARY: No dysuria, No  hematuria  NEUROLOGICAL: No diplopia, No paresthesia, No motor dysfunction  MUSCULOSKELETAL: No arthralgia, No myalgia  SKIN: No rashes, or lesions   PSYCH: no anxiety, no suicidal ideation    All other review of systems is negative unless indicated above    Vital Signs Last 24 Hrs  T(C): 36.3 (07 Jun 2024 08:12), Max: 36.4 (06 Jun 2024 21:42)  T(F): 97.3 (07 Jun 2024 08:12), Max: 97.6 (06 Jun 2024 21:42)  HR: 83 (07 Jun 2024 13:45) (68 - 88)  BP: 114/82 (07 Jun 2024 08:12) (114/82 - 118/55)  RR: 18 (07 Jun 2024 08:12) (18 - 18)  SpO2: 94% (07 Jun 2024 08:12) (91% - 95%)    Parameters below as of 07 Jun 2024 08:12  Patient On (Oxygen Delivery Method): nasal cannula  O2 Flow (L/min): 5      PHYSICAL EXAM:    GENERAL: NAD  HEENT:  NC/AT, EOMI, PERRLA, No scleral icterus, Moist mucous membranes  NECK: Supple, No JVD  CNS:  Alert & Oriented X3, Motor Strength 5/5 B/L upper and lower extremities; DTRs 2+ intact   LUNG: decreased Breath sounds, Clear to auscultation bilaterally, No rales, No rhonchi, No wheezing  HEART: RRR; No murmurs, No rubs  ABDOMEN: +BS, ST/ND/NT  GENITOURINARY: Voiding, Bladder not distended  EXTREMITIES:  2+ Peripheral Pulses, No clubbing, No cyanosis, No tibial edema  MUSCULOSKELTAL: Joints normal ROM, No TTP, No effusion  SKIN: no rashes  RECTAL: deferred, not indicated  BREAST: deferred                          10.8   19.78 )-----------( 228      ( 06 Jun 2024 06:18 )             30.5     06-06    122<L>  |  91<L>  |  25<H>  ----------------------------<  105<H>  3.9   |  25  |  0.36<L>    Ca    7.7<L>      06 Jun 2024 06:18      MEDICATIONS  (STANDING):  albuterol    0.083% 2.5 milliGRAM(s) Nebulizer every 6 hours  albuterol    90 MICROgram(s) HFA Inhaler 1 Puff(s) Inhalation every 4 hours  aspirin enteric coated 81 milliGRAM(s) Oral daily  atorvastatin 40 milliGRAM(s) Oral at bedtime  budesonide 160 MICROgram(s)/formoterol 4.5 MICROgram(s) Inhaler 2 Puff(s) Inhalation two times a day  clotrimazole Lozenge 1 Lozenge Oral <User Schedule>  enoxaparin Injectable 40 milliGRAM(s) SubCutaneous every 24 hours  glucagon  Injectable 1 milliGRAM(s) IntraMuscular once  insulin glargine Injectable (LANTUS) 5 Unit(s) SubCutaneous every morning  insulin lispro (ADMELOG) corrective regimen sliding scale   SubCutaneous three times a day before meals  insulin lispro (ADMELOG) corrective regimen sliding scale   SubCutaneous at bedtime  insulin lispro Injectable (ADMELOG) 2 Unit(s) SubCutaneous three times a day before meals  lisinopril 10 milliGRAM(s) Oral daily  metoprolol succinate ER 25 milliGRAM(s) Oral daily  predniSONE   Tablet 40 milliGRAM(s) Oral daily  tiotropium 2.5 MICROgram(s) Inhaler 2 Puff(s) Inhalation daily      all labs reviewed  all imaging reviewed    Labs:                        11.4   15.53 )-----------( 226      ( 07 Jun 2024 07:26 )             32.3     06-07    125<L>  |  91<L>  |  16  ----------------------------<  104<H>  3.7   |  28  |  0.27<L>    Ca    8.4<L>      07 Jun 2024 07:26             Cultures:   79M with PMH of Lung Ca s/p chemoradiation (last dose of both as per patient in April 2024) and HLD presents with SOB/Cough/Fevers and chills for 2-3 days. onset of cough and MCDONALD 2-3 days prior to admission and since then it has been progressively worsening. Started to have chills 1-2 days prior to admission and these symptoms remains persistent and getting worse. Denies syncope, chest pain, nausea, vomiting, abdominal pain/discomfort. Diarrhea reports (1 episode daily) and non-bloody. Cough productive with yellow and white sputum production. Denies having diagnosis of COPD.    1. Severe Sepsis Secondary to Pneumonia in Immunocompromised Lung Cancer Patient  Acute bronchospasm (likely with underlying COPD but denies ever having diagnosis of COPD)  Acute Respiratory Failure with Hypoxia due to above + Likely component of radiation pneumonitis.  Associated elevated troponin secondary to supply/demand ischemia due to above  Lung cancer s/p chemoradiation (last dose reported april 2024).   -Troponins elevated in 100s without significant rise .Telemetry x 24 hours->no events->DC tele  -TTE unremarkable    -Completed Cefepime/Doxy   -Cultures NGTD  -O2 supplementation  -Albuterol/Symbicort  -on Prednisone taper     -Repeat CXRAY (5/27) with vascular congestion (likely fluid retention from steroids). IV Lasix 20mg x 1 ordered on 5/28. Continue to monitor.   -Hem/Onc consult/recs  -Repeat CT findings noted. Additional dose of lasix ordered on 5/29. Continue to monitor->subsequently hyponatremia  will d/c Lasix  and use only prn due to hyponatremia     2. 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 Prediabetes with Hyperglycemia due to steroids  -Augmented by administration of solumedrol  -A1c 6.4%  -BGM/ISS/Lantus. Titrate accordingly.     4. Leucocytosis due to steroids     DVT Prophylaxis: Lovenox       Commode  3:1 commode;  pt will require a 3:1 commode as pt incapable of ambulating to bathroom as patient is confined to a single room without a bathroom.      Transport Wheelchair  This patient has a mobility limitation that significantly impairs the patients ability to participate in one or more MRADL's such as: toileting, eating, dressing and bathing in customary locations in the home.  Patient's home provides adequate access between rooms for the use of the manual wheelchair.  The wheelchair will significantly improve patient's ability to participate in MRADL's and will be used on a regular basis in the home. The patient's mobility limitation cannot be resolved by the use of a walker or cane.  This patient does not have the upper extremity function to safely propel the manual wheelchair.  Patient has a 24/7 care giver in the home who is willing to propel the wheelchair at any given time. The patient has agreed to use the wheelchair on a daily basis in the home. Diagnosis: Lung Cancer, Pneumonitis, weakness, FTT, COPD

## 2024-06-07 NOTE — CONSULT NOTE ADULT - SUBJECTIVE AND OBJECTIVE BOX
NEPHROLOGY CONSULT  HPI:  79M with PMH of Lung Ca s/p chemoradiation (last dose of both as per patient in April 2024) and HLD presents with SOB/Cough/Fevers and chills for 2-3 days. onset of cough and MCDONALD 2-3 days prior to admission and since then it has been progressively worsening. Started to have chills 1-2 days prior to admission and these symptoms remains persistent and getting worse. Denies syncope, chest pain, nausea, vomiting, abdominal pain/discomfort. Diarrhea reports (1 episode daily) and non-bloody. Cough productive with yellow and white sputum production. Denies having diagnosis of COPD. Former tobacco use quit many years ago.     In the ER Tmax 100.4F, -108, BP 14/83, RR 16-28, SpO2 85% on RA. WBC 9.49, Hgb 11.1, Na 134 otherwise CMP grossly unremarkable. Lactate 1.9, ProBNP 657, Troponin 116.63->149.07.     < from: CT Angio Chest PE Protocol w/ IV Cont (05.23.24 @ 11:34) >  IMPRESSION:    No pulmonary embolism through the level of the segmental pulmonary   arteries. Evaluation of some subsegmental pulmonary arteries is limited.    Left lower lobe nodular opacity likely known lung cancer. Left parahilar   radiation changes.    Nonspecific diffuse groundglass throughout the right lung, differential   for which includes pneumonitis or infection.    < end of copied text >       (23 May 2024 16:38)      PAST MEDICAL & SURGICAL HISTORY:  HLD (hyperlipidemia)      Hyperlipemia      Lung cancer      No significant past surgical history          FAMILY HISTORY:  No pertinent family history in first degree relatives        MEDICATIONS  (STANDING):  albuterol    0.083% 2.5 milliGRAM(s) Nebulizer every 6 hours  albuterol    90 MICROgram(s) HFA Inhaler 1 Puff(s) Inhalation every 4 hours  aspirin enteric coated 81 milliGRAM(s) Oral daily  atorvastatin 40 milliGRAM(s) Oral at bedtime  budesonide 160 MICROgram(s)/formoterol 4.5 MICROgram(s) Inhaler 2 Puff(s) Inhalation two times a day  clotrimazole Lozenge 1 Lozenge Oral <User Schedule>  dextrose 10% Bolus 125 milliLiter(s) IV Bolus once  dextrose 5%. 1000 milliLiter(s) (100 mL/Hr) IV Continuous <Continuous>  dextrose 5%. 1000 milliLiter(s) (50 mL/Hr) IV Continuous <Continuous>  dextrose 50% Injectable 25 Gram(s) IV Push once  dextrose 50% Injectable 12.5 Gram(s) IV Push once  enoxaparin Injectable 40 milliGRAM(s) SubCutaneous every 24 hours  glucagon  Injectable 1 milliGRAM(s) IntraMuscular once  insulin glargine Injectable (LANTUS) 5 Unit(s) SubCutaneous every morning  insulin lispro (ADMELOG) corrective regimen sliding scale   SubCutaneous three times a day before meals  insulin lispro (ADMELOG) corrective regimen sliding scale   SubCutaneous at bedtime  insulin lispro Injectable (ADMELOG) 2 Unit(s) SubCutaneous three times a day before meals  lisinopril 10 milliGRAM(s) Oral daily  metoprolol succinate ER 25 milliGRAM(s) Oral daily  predniSONE   Tablet 40 milliGRAM(s) Oral daily  tiotropium 2.5 MICROgram(s) Inhaler 2 Puff(s) Inhalation daily    MEDICATIONS  (PRN):  acetaminophen     Tablet .. 650 milliGRAM(s) Oral every 6 hours PRN Temp greater or equal to 38C (100.4F), Mild Pain (1 - 3)  aluminum hydroxide/magnesium hydroxide/simethicone Suspension 30 milliLiter(s) Oral every 4 hours PRN Dyspepsia  benzonatate 100 milliGRAM(s) Oral every 8 hours PRN Cough  dextrose Oral Gel 15 Gram(s) Oral once PRN Blood Glucose LESS THAN 70 milliGRAM(s)/deciliter  guaiFENesin Oral Liquid (Sugar-Free) 100 milliGRAM(s) Oral every 6 hours PRN Cough  melatonin 3 milliGRAM(s) Oral at bedtime PRN Insomnia  ondansetron Injectable 4 milliGRAM(s) IV Push every 8 hours PRN Nausea and/or Vomiting      Allergies    No Known Allergies    Intolerances        I&O's Summary    06 Jun 2024 07:01  -  07 Jun 2024 07:00  --------------------------------------------------------  IN: 0 mL / OUT: 1220 mL / NET: -1220 mL          REVIEW OF SYSTEMS:    CONSTITUTIONAL:  As per HPI.  CONSTITUTIONAL: No weakness, fevers or chills  EYES/ENT: No visual changes;  No vertigo or throat pain   NECK: No pain or stiffness  CARDIOVASCULAR: No chest pain or palpitations  GASTROINTESTINAL: No abdominal or epigastric pain. No nausea, vomiting, or hematemesis; No diarrhea or constipation. No melena or hematochezia.  GENITOURINARY: No dysuria, frequency or hematuria  NEUROLOGICAL: No numbness or weakness  SKIN: No itching, burning, rashes, or lesions   All other review of systems is negative unless indicated above      Vital Signs Last 24 Hrs  T(C): 36.3 (07 Jun 2024 08:12), Max: 36.4 (06 Jun 2024 21:42)  T(F): 97.3 (07 Jun 2024 08:12), Max: 97.6 (06 Jun 2024 21:42)  HR: 83 (07 Jun 2024 13:45) (68 - 88)  BP: 114/82 (07 Jun 2024 08:12) (114/82 - 118/55)  BP(mean): --  RR: 18 (07 Jun 2024 08:12) (18 - 18)  SpO2: 94% (07 Jun 2024 08:12) (91% - 95%)    Parameters below as of 07 Jun 2024 08:12  Patient On (Oxygen Delivery Method): nasal cannula  O2 Flow (L/min): 5      Daily     Daily     I&O's Summary    06 Jun 2024 07:01  -  07 Jun 2024 07:00  --------------------------------------------------------  IN: 0 mL / OUT: 1220 mL / NET: -1220 mL        PHYSICAL EXAM:    General:  Alert, No acute distress.    Neuro:  Alert and oriented to person, place, and time. Able to communicate  well.      HEENT:  No JVD, no masses, Eyes anicteric, ,    Cardiovascular:  Regular rate and rhythm, with normal S1 and S2.    Lungs:  clear. no rales, no wheezing, .    Abdomen:  Normoactive bowel sounds. Soft, flat, non-tender, and non-distended.  positive bowel sounds    Skin:  Warm, dry    Extremities:  warm,  no cyanosis    LABS:                        11.4   15.53 )-----------( 226      ( 07 Jun 2024 07:26 )             32.3     06-07    125<L>  |  91<L>  |  16  ----------------------------<  104<H>  3.7   |  28  |  0.27<L>    Ca    8.4<L>      07 Jun 2024 07:26        Urinalysis Basic - ( 07 Jun 2024 07:26 )    Color: x / Appearance: x / SG: x / pH: x  Gluc: 104 mg/dL / Ketone: x  / Bili: x / Urobili: x   Blood: x / Protein: x / Nitrite: x   Leuk Esterase: x / RBC: x / WBC x   Sq Epi: x / Non Sq Epi: x / Bacteria: x           NEPHROLOGY CONSULT  HPI:  79M with PMH of Lung Ca s/p chemoradiation (last dose of both as per patient in April 2024) and HLD presents with SOB/Cough/Fevers and chills for 2-3 days. onset of cough and MCDONALD 2-3 days prior to admission and since then it has been progressively worsening. Started to have chills 1-2 days prior to admission and these symptoms remains persistent and getting worse. Denies syncope, chest pain, nausea, vomiting, abdominal pain/discomfort. Diarrhea reports (1 episode daily) and non-bloody. Cough productive with yellow and white sputum production. Denies having diagnosis of COPD. Former tobacco use quit many years ago.     In the ER Tmax 100.4F, -108, BP 14/83, RR 16-28, SpO2 85% on RA. WBC 9.49, Hgb 11.1, Na 134 otherwise CMP grossly unremarkable. Lactate 1.9, ProBNP 657, Troponin 116.63->149.07.     Nephrology  Pt w above hx as per chart  Renal consult requested for hyponatremia  Pt has CT/ CXR finding c/w GGO, pneumonitis/ infection  Was on diuretics now dc d due developing hyponatremia  Since dc d yesterday, Na improved 122 to 125  d/e Dr Perera      PAST MEDICAL & SURGICAL HISTORY:  HLD (hyperlipidemia)      Hyperlipemia      Lung cancer      No significant past surgical history          FAMILY HISTORY:  No pertinent family history in first degree relatives        MEDICATIONS  (STANDING):  albuterol    0.083% 2.5 milliGRAM(s) Nebulizer every 6 hours  albuterol    90 MICROgram(s) HFA Inhaler 1 Puff(s) Inhalation every 4 hours  aspirin enteric coated 81 milliGRAM(s) Oral daily  atorvastatin 40 milliGRAM(s) Oral at bedtime  budesonide 160 MICROgram(s)/formoterol 4.5 MICROgram(s) Inhaler 2 Puff(s) Inhalation two times a day  clotrimazole Lozenge 1 Lozenge Oral <User Schedule>  dextrose 10% Bolus 125 milliLiter(s) IV Bolus once  dextrose 5%. 1000 milliLiter(s) (100 mL/Hr) IV Continuous <Continuous>  dextrose 5%. 1000 milliLiter(s) (50 mL/Hr) IV Continuous <Continuous>  dextrose 50% Injectable 25 Gram(s) IV Push once  dextrose 50% Injectable 12.5 Gram(s) IV Push once  enoxaparin Injectable 40 milliGRAM(s) SubCutaneous every 24 hours  glucagon  Injectable 1 milliGRAM(s) IntraMuscular once  insulin glargine Injectable (LANTUS) 5 Unit(s) SubCutaneous every morning  insulin lispro (ADMELOG) corrective regimen sliding scale   SubCutaneous three times a day before meals  insulin lispro (ADMELOG) corrective regimen sliding scale   SubCutaneous at bedtime  insulin lispro Injectable (ADMELOG) 2 Unit(s) SubCutaneous three times a day before meals  lisinopril 10 milliGRAM(s) Oral daily  metoprolol succinate ER 25 milliGRAM(s) Oral daily  predniSONE   Tablet 40 milliGRAM(s) Oral daily  sodium chloride 2 Gram(s) Oral once  tiotropium 2.5 MICROgram(s) Inhaler 2 Puff(s) Inhalation daily    MEDICATIONS  (PRN):  acetaminophen     Tablet .. 650 milliGRAM(s) Oral every 6 hours PRN Temp greater or equal to 38C (100.4F), Mild Pain (1 - 3)  aluminum hydroxide/magnesium hydroxide/simethicone Suspension 30 milliLiter(s) Oral every 4 hours PRN Dyspepsia  benzonatate 100 milliGRAM(s) Oral every 8 hours PRN Cough  dextrose Oral Gel 15 Gram(s) Oral once PRN Blood Glucose LESS THAN 70 milliGRAM(s)/deciliter  guaiFENesin Oral Liquid (Sugar-Free) 100 milliGRAM(s) Oral every 6 hours PRN Cough  melatonin 3 milliGRAM(s) Oral at bedtime PRN Insomnia  ondansetron Injectable 4 milliGRAM(s) IV Push every 8 hours PRN Nausea and/or Vomiting      Allergies    No Known Allergies    Intolerances        I&O's Summary    06 Jun 2024 07:01  -  07 Jun 2024 07:00  --------------------------------------------------------  IN: 0 mL / OUT: 1220 mL / NET: -1220 mL          REVIEW OF SYSTEMS:    CONSTITUTIONAL:  As per HPI.  CONSTITUTIONAL: No weakness, fevers or chills  EYES/ENT: No visual changes;  No vertigo or throat pain   NECK: No pain or stiffness  CARDIOVASCULAR: No chest pain or palpitations  GASTROINTESTINAL: No abdominal or epigastric pain. No nausea, vomiting, or hematemesis; No diarrhea or constipation. No melena or hematochezia.  GENITOURINARY: No dysuria, frequency or hematuria  NEUROLOGICAL: No numbness or weakness  SKIN: No itching, burning, rashes, or lesions   All other review of systems is negative unless indicated above      Vital Signs Last 24 Hrs  T(C): 36.3 (07 Jun 2024 08:12), Max: 36.4 (06 Jun 2024 21:42)  T(F): 97.3 (07 Jun 2024 08:12), Max: 97.6 (06 Jun 2024 21:42)  HR: 83 (07 Jun 2024 13:45) (68 - 88)  BP: 114/82 (07 Jun 2024 08:12) (114/82 - 118/55)  BP(mean): --  RR: 18 (07 Jun 2024 08:12) (18 - 18)  SpO2: 94% (07 Jun 2024 08:12) (91% - 95%)    Parameters below as of 07 Jun 2024 08:12  Patient On (Oxygen Delivery Method): nasal cannula  O2 Flow (L/min): 5      Daily     Daily     I&O's Summary    06 Jun 2024 07:01  -  07 Jun 2024 07:00  --------------------------------------------------------  IN: 0 mL / OUT: 1220 mL / NET: -1220 mL        PHYSICAL EXAM:    General:  Alert, No acute distress.    Neuro:  Alert and oriented to person, place, and time. Able to communicate  well.      HEENT:  No JVD, no masses, Eyes anicteric, ,    Cardiovascular:  Regular rate and rhythm, with normal S1 and S2.    Lungs:  bilateral rales    Abdomen:  Normoactive bowel sounds. Soft, flat, non-tender, and non-distended.  positive bowel sounds    Skin:  Warm, dry    Extremities:  warm,  no cyanosis    LABS:                        11.4   15.53 )-----------( 226      ( 07 Jun 2024 07:26 )             32.3     06-07    125<L>  |  91<L>  |  16  ----------------------------<  104<H>  3.7   |  28  |  0.27<L>    Ca    8.4<L>      07 Jun 2024 07:26        Urinalysis Basic - ( 07 Jun 2024 07:26 )    Color: x / Appearance: x / SG: x / pH: x  Gluc: 104 mg/dL / Ketone: x  / Bili: x / Urobili: x   Blood: x / Protein: x / Nitrite: x   Leuk Esterase: x / RBC: x / WBC x   Sq Epi: x / Non Sq Epi: x / Bacteria: x

## 2024-06-07 NOTE — PROGRESS NOTE ADULT - ASSESSMENT
79M with PMH of Lung Ca s/p chemoradiation (last dose of both as per patient in April 2024) and HLD presents with SOB/Cough/Fevers and chills for 2-3 days. onset of cough and MCDONALD 2-3 days prior to admission and since then it has been progressively worsening. Started to have chills 1-2 days prior to admission and these symptoms remains persistent and getting worse. Denies syncope, chest pain, nausea, vomiting, abdominal pain/discomfort. Diarrhea reports (1 episode daily) and non-bloody. Cough productive with yellow and white sputum production. Denies having diagnosis of COPD. Former tobacco use quit many years ago.   In the ER Tmax 100.4F, -108, BP 14/83, RR 16-28, SpO2 85% on RA. WBC 9.49, Hgb 11.1, Na 134 otherwise CMP grossly unremarkable. Lactate 1.9, ProBNP 657, Troponin 116.63->149.07.   Left lower lobe nodular opacity likely known lung cancer. Left parahilar   radiation changes. Nonspecific diffuse groundglass throughout the right lung, differential   for which includes pneumonitis or infection. repeat ct chest 5/28 noted    Lung ca s/p chemo RT  suspected pneumonitis  no definite consolidation / pneumonia  adequate oxygenation but hypoxemia on exertion  No hemoptysis or discolored sputum  complete course of antibiotics  slow steroid taper  complete course of steroids and antibiotics  walk test for re eval hypoxemia on exertion  mobilize OOb and re eval oxygenation on exertion  ABG - ( 29 May 2024 14:51 )  pH, Arterial: 7.43  pH, Blood: x     /  pCO2: 36    /  pO2: 83    / HCO3: 24    / Base Excess: -0.1  /  SaO2: 98      Large A-a gradient despite antibiotics and steroids  etiology likley pneumonitis with persistent infiltrates and basilar crackles on exam  will need home o2 set up  switch to po ceftin /lower dose of cefepime IV  Doubt PE with negative CTA and alternate dx available with pneumonitis seen on ct scan  concern wit persistent infiltrates despite treatment  not a good candidate for lung bx  conservative measures appropriate  discussed with hospitalist  all recent films personally reviewed  data discussed with hospitalist  palliative care team noted  re eval for hypoxemia on exertion with walk test while using O2 4liters nc today pls    6/5/24  worsened cxr with increased perihilar infiltrates  CHF maybe cont factor  gentle diuresis suggested  fu films and cont with steroids slow taper suggested  will need Home o2 set up  echo if not recently done    6/6  IVF stopped  gentle diuresis for mild CHF based on exam and cxr  Worsened hyponatremia and suggest renal consult before planned discharge since worsened Na 122 now  cont with steroids  fu cxr after diuresis in am requested  thanks    6/7  Dc planning delayed with worsened hyponatremia  repeat cxr today after diuresis  walk test while on O2 4 liters nc  Dr Fontana is covering this weekend

## 2024-06-08 NOTE — PROGRESS NOTE ADULT - SUBJECTIVE AND OBJECTIVE BOX
HOSPITALIST ATTENDING PROGRESS NOTE    Chart and meds reviewed.      HPI: 79M with PMH of Lung Ca s/p chemoradiation (last dose of both as per patient in April 2024) and HLD presents with SOB/Cough/Fevers and chills for 2-3 days. onset of cough and MCDONALD 2-3 days prior to admission and since then it has been progressively worsening. Started to have chills 1-2 days prior to admission and these symptoms remains persistent and getting worse. Denies syncope, chest pain, nausea, vomiting, abdominal pain/discomfort. Diarrhea reports (1 episode daily) and non-bloody. Cough productive with yellow and white sputum production. Denies having diagnosis of COPD. Former tobacco use quit many years ago.   -found to have Pna and Pneumonitis , started on Abx and steroids  -given Lasix for 4days for fluid overload (despite normal BNP) ,  with subsequent Hyponatremia     6.6: no dyspnea, c/o gen weakness   6.7: no dyspnea today, less cough, feels better   6.8: no dyspnea , c/o gen weakness, +residual coughing     REVIEW OF SYSTEMS:    CONSTITUTIONAL: No weakness, No fevers or chills  ENT: No ear ache, No sorethroat  NECK: No pain, No stiffness  RESPIRATORY: No cough, No wheezing, No hemoptysis; No dyspnea  CARDIOVASCULAR: No chest pain, No palpitations  GASTROINTESTINAL: No abd pain, No nausea, No vomiting, No hematemesis, No diarrhea or constipation. No melena, No hematochezia.  GENITOURINARY: No dysuria, No  hematuria  NEUROLOGICAL: No diplopia, No paresthesia, No motor dysfunction  MUSCULOSKELETAL: No arthralgia, No myalgia  SKIN: No rashes, or lesions   PSYCH: no anxiety, no suicidal ideation    All other review of systems is negative unless indicated above    Vital Signs Last 24 Hrs  T(C): 36.5 (08 Jun 2024 08:08), Max: 36.6 (07 Jun 2024 20:33)  T(F): 97.7 (08 Jun 2024 08:08), Max: 97.9 (07 Jun 2024 20:33)  HR: 88 (08 Jun 2024 10:50) (70 - 88)  BP: 123/69 (08 Jun 2024 08:08) (110/54 - 123/69)  BP(mean): --  RR: 18 (08 Jun 2024 08:08) (18 - 18)  SpO2: 94% (08 Jun 2024 10:50) (94% - 96%)    Parameters below as of 08 Jun 2024 10:50  Patient On (Oxygen Delivery Method): nasal cannula, 4 lpm          PHYSICAL EXAM:    GENERAL: NAD  HEENT:  NC/AT, EOMI, PERRLA, No scleral icterus, Moist mucous membranes  NECK: Supple, No JVD  CNS:  Alert & Oriented X3, Motor Strength 5/5 B/L upper and lower extremities; DTRs 2+ intact   LUNG: decreased Breath sounds, Clear to auscultation bilaterally, No rales, No rhonchi, No wheezing  HEART: RRR; No murmurs, No rubs  ABDOMEN: +BS, ST/ND/NT  GENITOURINARY: Voiding, Bladder not distended  EXTREMITIES:  2+ Peripheral Pulses, No clubbing, No cyanosis, No tibial edema  MUSCULOSKELTAL: Joints normal ROM, No TTP, No effusion  SKIN: no rashes  RECTAL: deferred, not indicated  BREAST: deferred    Labs:                        10.4   17.05 )-----------( 185      ( 08 Jun 2024 07:08 )             29.6     06-08    123<L>  |  90<L>  |  20  ----------------------------<  84  3.8   |  25  |  0.32<L>    Ca    8.0<L>      08 Jun 2024 07:08      MEDICATIONS  (STANDING):  albuterol    0.083% 2.5 milliGRAM(s) Nebulizer every 6 hours  albuterol    90 MICROgram(s) HFA Inhaler 1 Puff(s) Inhalation every 4 hours  aspirin enteric coated 81 milliGRAM(s) Oral daily  atorvastatin 40 milliGRAM(s) Oral at bedtime  budesonide 160 MICROgram(s)/formoterol 4.5 MICROgram(s) Inhaler 2 Puff(s) Inhalation two times a day  clotrimazole Lozenge 1 Lozenge Oral <User Schedule>  enoxaparin Injectable 40 milliGRAM(s) SubCutaneous every 24 hours  glucagon  Injectable 1 milliGRAM(s) IntraMuscular once  insulin glargine Injectable (LANTUS) 5 Unit(s) SubCutaneous every morning  insulin lispro (ADMELOG) corrective regimen sliding scale   SubCutaneous three times a day before meals  insulin lispro (ADMELOG) corrective regimen sliding scale   SubCutaneous at bedtime  insulin lispro Injectable (ADMELOG) 2 Unit(s) SubCutaneous three times a day before meals  lisinopril 10 milliGRAM(s) Oral daily  metoprolol succinate ER 25 milliGRAM(s) Oral daily  polyethylene glycol 3350 17 Gram(s) Oral daily  predniSONE   Tablet 40 milliGRAM(s) Oral daily  senna 2 Tablet(s) Oral at bedtime  sodium chloride 1 Gram(s) Oral three times a day  tiotropium 2.5 MICROgram(s) Inhaler 2 Puff(s) Inhalation daily      Labs:                        11.4   15.53 )-----------( 226      ( 07 Jun 2024 07:26 )             32.3     06-07    125<L>  |  91<L>  |  16  ----------------------------<  104<H>  3.7   |  28  |  0.27<L>    Ca    8.4<L>      07 Jun 2024 07:26             Cultures:   79M with PMH of Lung Ca s/p chemoradiation (last dose of both as per patient in April 2024) and HLD presents with SOB/Cough/Fevers and chills for 2-3 days. onset of cough and MCDONALD 2-3 days prior to admission and since then it has been progressively worsening. Started to have chills 1-2 days prior to admission and these symptoms remains persistent and getting worse. Denies syncope, chest pain, nausea, vomiting, abdominal pain/discomfort. Diarrhea reports (1 episode daily) and non-bloody. Cough productive with yellow and white sputum production. Denies having diagnosis of COPD.    1. Severe Sepsis Secondary to Pneumonia/Lung CA  Acute bronchospasm (likely with underlying COPD but denies ever having diagnosis of COPD)  Acute Respiratory Failure with Hypoxia due to above + Likely component of radiation pneumonitis.  Associated elevated troponin secondary to supply/demand ischemia due to above  Lung cancer s/p chemoradiation (last dose reported april 2024).     -TTE unremarkable    -Completed Cefepime/Doxy   -Cultures NGTD  -O2 supplementation  -Albuterol/Symbicort  -on Prednisone taper     2. 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 Prediabetes with Hyperglycemia due to steroids  -Augmented by administration of solumedrol  -A1c 6.4%  -BGM/ISS/Lantus. Titrate accordingly.     4. Leucocytosis due to steroids     DVT Prophylaxis: Lovenox       Commode  3:1 commode;  pt will require a 3:1 commode as pt incapable of ambulating to bathroom as patient is confined to a single room without a bathroom.      Transport Wheelchair  This patient has a mobility limitation that significantly impairs the patients ability to participate in one or more MRADL's such as: toileting, eating, dressing and bathing in customary locations in the home.  Patient's home provides adequate access between rooms for the use of the manual wheelchair.  The wheelchair will significantly improve patient's ability to participate in MRADL's and will be used on a regular basis in the home. The patient's mobility limitation cannot be resolved by the use of a walker or cane.  This patient does not have the upper extremity function to safely propel the manual wheelchair.  Patient has a 24/7 care giver in the home who is willing to propel the wheelchair at any given time. The patient has agreed to use the wheelchair on a daily basis in the home. Diagnosis: Lung Cancer, Pneumonitis, weakness, FTT, COPD     Plan: d/c home when Na approaches his baseline ( around 130)

## 2024-06-08 NOTE — PROGRESS NOTE ADULT - ASSESSMENT
79M with PMH of Lung Ca s/p chemoradiation (last dose of both as per patient in April 2024) and HLD presents with SOB/Cough/Fevers and chills for 2-3 days. onset of cough and MCDONALD 2-3 days prior to admission and since then it has been progressively worsening. Started to have chills 1-2 days prior to admission and these symptoms remains persistent and getting worse. Denies syncope, chest pain, nausea, vomiting, abdominal pain/discomfort. Diarrhea reports (1 episode daily) and non-bloody. Cough productive with yellow and white sputum production. Denies having diagnosis of COPD. Former tobacco use quit many years ago.   In the ER Tmax 100.4F, -108, BP 14/83, RR 16-28, SpO2 85% on RA. WBC 9.49, Hgb 11.1, Na 134 otherwise CMP grossly unremarkable. Lactate 1.9, ProBNP 657, Troponin 116.63->149.07.     Nephrology  Pt w above hx as per chart  Renal consult requested for hyponatremia - hypvolemia vs SIADH in setting of lung ca   Na dropped from yesterday AM but improved from last night  Advised on fluid restriction   continue NaCl tabs for now - increased to TID today   repeat AM labs     d/w Dr Dilma Agee et al to resume care Monday

## 2024-06-08 NOTE — PROGRESS NOTE ADULT - SUBJECTIVE AND OBJECTIVE BOX
COVERING FOR DR FREDERICK     Patient is a 79y Male with  who reports   no complaints overnight.  not eating or drinking excessively       REVIEW OF SYSTEMS:    CONSTITUTIONAL: No weakness, fevers or chills  RESPIRATORY: No cough, wheezing, hemoptysis; No shortness of breath  CARDIOVASCULAR: No chest pain or palpitations  ABDOMEN: no abd pains, nausea or vomiting  GENITOURINARY: No dysuria, frequency or hematuria  All other review of systems is negative unless indicated above.    Allergies    No Known Allergies    Intolerances        MEDICATIONS  (STANDING):  albuterol    0.083% 2.5 milliGRAM(s) Nebulizer every 6 hours  albuterol    90 MICROgram(s) HFA Inhaler 1 Puff(s) Inhalation every 4 hours  aspirin enteric coated 81 milliGRAM(s) Oral daily  atorvastatin 40 milliGRAM(s) Oral at bedtime  budesonide 160 MICROgram(s)/formoterol 4.5 MICROgram(s) Inhaler 2 Puff(s) Inhalation two times a day  clotrimazole Lozenge 1 Lozenge Oral <User Schedule>  dextrose 10% Bolus 125 milliLiter(s) IV Bolus once  dextrose 5%. 1000 milliLiter(s) (100 mL/Hr) IV Continuous <Continuous>  dextrose 5%. 1000 milliLiter(s) (50 mL/Hr) IV Continuous <Continuous>  dextrose 50% Injectable 25 Gram(s) IV Push once  dextrose 50% Injectable 12.5 Gram(s) IV Push once  enoxaparin Injectable 40 milliGRAM(s) SubCutaneous every 24 hours  glucagon  Injectable 1 milliGRAM(s) IntraMuscular once  insulin glargine Injectable (LANTUS) 5 Unit(s) SubCutaneous every morning  insulin lispro (ADMELOG) corrective regimen sliding scale   SubCutaneous three times a day before meals  insulin lispro (ADMELOG) corrective regimen sliding scale   SubCutaneous at bedtime  insulin lispro Injectable (ADMELOG) 2 Unit(s) SubCutaneous three times a day before meals  lisinopril 10 milliGRAM(s) Oral daily  metoprolol succinate ER 25 milliGRAM(s) Oral daily  polyethylene glycol 3350 17 Gram(s) Oral daily  senna 2 Tablet(s) Oral at bedtime  sodium chloride 1 Gram(s) Oral three times a day  tiotropium 2.5 MICROgram(s) Inhaler 2 Puff(s) Inhalation daily      Vitals:  T(F): 97.7 (06-08-24), Max: 97.9 (06-07-24)  HR: 88 (06-08-24) (70 - 88)  BP: 123/69 (06-08-24) (110/54 - 123/69)  RR: 18 (06-08-24)  SpO2: 94% (06-08-24)        PHYSICAL EXAM:    Constitutional: NAD, frail   HEENT:  EOMI,  MM  Neck: No JVD  Respiratory: CTAB  Cardiovascular: S1 and S2  Gastrointestinal: BS+, soft, NT/ND  Extremities: No peripheral edema  Neurological: A/O x 3, no focal deficits  : No Hill  Skin: No rashes  Dialysis Access: Not applicable    LABS:                        10.4   17.05 )-----------( 185      ( 08 Jun 2024 07:08 )             29.6                         11.4   15.53 )-----------( 226      ( 07 Jun 2024 07:26 )             32.3       123    |  90     |  20     ----------------------------<  84        08 Jun 2024 07:08  3.8     |  25     |  0.32     122    |  90     |  26     ----------------------------<  224       07 Jun 2024 18:35  4.5     |  24     |  0.67     125    |  91     |  16     ----------------------------<  104       07 Jun 2024 07:26  3.7     |  28     |  0.27     Ca    8.0        08 Jun 2024 07:08  Ca    8.0        07 Jun 2024 18:35            Serum Osmo:   Serum Uric Acid: Uric Acid: 2.1 mg/dL (06-08 @ 07:08)      Urine Studies:  Urinalysis Basic - ( 08 Jun 2024 07:08 )    Color: x / Appearance: x / SG: x / pH: x  Gluc: 84 mg/dL / Ketone: x  / Bili: x / Urobili: x   Blood: x / Protein: x / Nitrite: x   Leuk Esterase: x / RBC: x / WBC x   Sq Epi: x / Non Sq Epi: x / Bacteria: x        Urine chemistry:   Urine Na:   Urine Creatinine:   Urine Protein/Cr ratio:  Urine K:   Urine Osm:       RADIOLOGY & ADDITIONAL STUDIES:

## 2024-06-08 NOTE — PROGRESS NOTE ADULT - SUBJECTIVE AND OBJECTIVE BOX
Patient is a 79y old  Male who presents with a chief complaint of Cough/SOB/Fevers (24 May 2024 22:41)      HPI:  79M with PMH of Lung Ca s/p chemoradiation (last dose of both as per patient in April 2024) and HLD presents with SOB/Cough/Fevers and chills for 2-3 days. onset of cough and MCDONALD 2-3 days prior to admission and since then it has been progressively worsening. Started to have chills 1-2 days prior to admission and these symptoms remains persistent and getting worse. Denies syncope, chest pain, nausea, vomiting, abdominal pain/discomfort. Diarrhea reports (1 episode daily) and non-bloody. Cough productive with yellow and white sputum production. Denies having diagnosis of COPD. Former tobacco use quit many years ago.   In the ER Tmax 100.4F, -108, BP 14/83, RR 16-28, SpO2 85% on RA. WBC 9.49, Hgb 11.1, Na 134 otherwise CMP grossly unremarkable. Lactate 1.9, ProBNP 657, Troponin 116.63->149.07.   CTA- No pulmonary embolism through the level of the segmental pulmonary   arteries. Evaluation of some subsegmental pulmonary arteries is limited. Left lower lobe nodular opacity likely known lung cancer. Left parahilar   radiation changes. Nonspecific diffuse groundglass throughout the right lung, differential   for which includes pneumonitis or infection.    MEDICATIONS  (STANDING):  albuterol    0.083% 2.5 milliGRAM(s) Nebulizer every 6 hours  albuterol    90 MICROgram(s) HFA Inhaler 1 Puff(s) Inhalation every 4 hours  aspirin enteric coated 81 milliGRAM(s) Oral daily  atorvastatin 40 milliGRAM(s) Oral at bedtime  budesonide 160 MICROgram(s)/formoterol 4.5 MICROgram(s) Inhaler 2 Puff(s) Inhalation two times a day  clotrimazole Lozenge 1 Lozenge Oral <User Schedule>  dextrose 10% Bolus 125 milliLiter(s) IV Bolus once  dextrose 5%. 1000 milliLiter(s) (100 mL/Hr) IV Continuous <Continuous>  dextrose 5%. 1000 milliLiter(s) (50 mL/Hr) IV Continuous <Continuous>  dextrose 50% Injectable 25 Gram(s) IV Push once  dextrose 50% Injectable 12.5 Gram(s) IV Push once  enoxaparin Injectable 40 milliGRAM(s) SubCutaneous every 24 hours  glucagon  Injectable 1 milliGRAM(s) IntraMuscular once  insulin glargine Injectable (LANTUS) 5 Unit(s) SubCutaneous every morning  insulin lispro (ADMELOG) corrective regimen sliding scale   SubCutaneous three times a day before meals  insulin lispro (ADMELOG) corrective regimen sliding scale   SubCutaneous at bedtime  insulin lispro Injectable (ADMELOG) 2 Unit(s) SubCutaneous three times a day before meals  lisinopril 10 milliGRAM(s) Oral daily  metoprolol succinate ER 25 milliGRAM(s) Oral daily  predniSONE   Tablet 40 milliGRAM(s) Oral daily  sodium chloride 1 Gram(s) Oral two times a day  tiotropium 2.5 MICROgram(s) Inhaler 2 Puff(s) Inhalation daily    MEDICATIONS  (PRN):  acetaminophen     Tablet .. 650 milliGRAM(s) Oral every 6 hours PRN Temp greater or equal to 38C (100.4F), Mild Pain (1 - 3)  aluminum hydroxide/magnesium hydroxide/simethicone Suspension 30 milliLiter(s) Oral every 4 hours PRN Dyspepsia  benzonatate 100 milliGRAM(s) Oral every 8 hours PRN Cough  dextrose Oral Gel 15 Gram(s) Oral once PRN Blood Glucose LESS THAN 70 milliGRAM(s)/deciliter  guaiFENesin Oral Liquid (Sugar-Free) 100 milliGRAM(s) Oral every 6 hours PRN Cough  melatonin 3 milliGRAM(s) Oral at bedtime PRN Insomnia  ondansetron Injectable 4 milliGRAM(s) IV Push every 8 hours PRN Nausea and/or Vomiting        Vital Signs Last 24 Hrs  T(C): 36.5 (08 Jun 2024 08:08), Max: 36.6 (07 Jun 2024 20:33)  T(F): 97.7 (08 Jun 2024 08:08), Max: 97.9 (07 Jun 2024 20:33)  HR: 70 (08 Jun 2024 08:08) (70 - 88)  BP: 123/69 (08 Jun 2024 08:08) (110/54 - 123/69)  BP(mean): --  RR: 18 (08 Jun 2024 08:08) (18 - 18)  SpO2: 96% (08 Jun 2024 08:08) (96% - 96%)    Parameters below as of 08 Jun 2024 08:08  Patient On (Oxygen Delivery Method): nasal cannula  O2 Flow (L/min): 5    PHYSICAL EXAM  General Appearance: cooperative, no acute distress,   HEENT: PERRL, conjunctiva clear, EOM's intact, non injected pharynx, no exudate, TM   normal  Neck: Supple, , no adenopathy, thyroid: not enlarged, no carotid bruit or JVD  Back: Symmetric, no  tenderness,no soft tissue tenderness  Lungs: bibasilar crackles persist, no wheeze  Heart: Regular rate and rhythm, S1, S2 normal, no murmur, rub or gallop  Abdomen: Soft, non-tender, bowel sounds active , no hepatosplenomegaly  Extremities: no cyanosis or edema, no joint swelling  Skin: Skin color, texture normal, no rashes   Neurologic: Alert and oriented X3 , cranial nerves intact, sensory and motor normal,    ECG:    LABS:                        10.8   19.78 )-----------( 228      ( 06 Jun 2024 06:18 )             30.5   06-06    122<L>  |  91<L>  |  25<H>  ----------------------------<  105<H>  3.9   |  25  |  0.36<L>    Ca    7.7<L>      06 Jun 2024 06:18                            10.8   19.78 )-----------( 228      ( 06 Jun 2024 06:18 )             30.5   06-06    122<L>  |  91<L>  |  25<H>  ----------------------------<  105<H>  3.9   |  25  |  0.36<L>    Ca    7.7<L>      06 Jun 2024 06:18                            11.1   18.29 )-----------( 250      ( 04 Jun 2024 06:47 )             31.7   06-03    126<L>  |  93<L>  |  28<H>  ----------------------------<  170<H>  4.4   |  28  |  0.46<L>    Ca    8.7      03 Jun 2024 06:54                            9.4    6.29  )-----------( 236      ( 24 May 2024 06:41 )             27.7     05-24    135  |  103  |  12  ----------------------------<  242<H>  4.2   |  24  |  0.69    Ca    8.6      24 May 2024 06:41  Phos  3.4     05-24  Mg     1.8     05-24    TPro  6.3  /  Alb  2.1<L>  /  TBili  0.5  /  DBili  x   /  AST  18  /  ALT  26  /  AlkPhos  97  05-24              Urinalysis Basic - ( 24 May 2024 06:41 )    Color: x / Appearance: x / SG: x / pH: x  Gluc: 242 mg/dL / Ketone: x  / Bili: x / Urobili: x   Blood: x / Protein: x / Nitrite: x   Leuk Esterase: x / RBC: x / WBC x   Sq Epi: x / Non Sq Epi: x / Bacteria: x      ABG - ( 29 May 2024 14:51 )  pH, Arterial: 7.43  pH, Blood: x     /  pCO2: 36    /  pO2: 83    / HCO3: 24    / Base Excess: -0.1  /  SaO2: 98          < from: Xray Chest 1 View- PORTABLE-Routine (Xray Chest 1 View- PORTABLE-Routine .) (06.04.24 @ 08:56) >  PROCEDURE DATE:  06/04/2024          INTERPRETATION:  AP chest on June 4, 2024 at 8:46 AM. Patient is short of   breath.    Heart magnified by technique.    On May 27 there are perihilar infiltrates.    On present examination left-sided infiltrates are stable. There are   increasing right upper perihilar infiltrates.    IMPRESSION: Stable left-sided infiltrates. Increasing right upper   perihilar infiltrate.    < end of copied text >            RADIOLOGY & ADDITIONAL STUDIES:  < from: CT Angio Chest PE Protocol w/ IV Cont (05.23.24 @ 11:34) >  IMPRESSION:    No pulmonary embolism through the level of the segmental pulmonary   arteries. Evaluation of some subsegmental pulmonary arteries is limited.    Left lower lobe nodular opacity likely known lung cancer. Left parahilar   radiation changes.    Nonspecific diffuse groundglass throughout the right lung, differential   for which includes pneumonitis or infection.    < end of copied text >    Unchanged right greater than left groundglass opacities, which are   nonspecific, at least some of which may be related to radiation therapy.   Differential diagnostic considerations also but is not limited to include   superimposed inflammatory etiologies.    Unchanged nodular opacities including a dominant 2.6 cm left lower lobe   nodular opacity which may correspond to the patient's history of lung   cancer.    Comparison with prior older chest CTs are recommended for complete   evaluation of the above findings and if images are made available, an   addendum can be issued.    Minimal left pleural fluid with interval decrease in size since May 23,   2024.

## 2024-06-08 NOTE — PROGRESS NOTE ADULT - ASSESSMENT
79M with PMH of Lung Ca s/p chemoradiation (last dose of both as per patient in April 2024) and HLD presents with SOB/Cough/Fevers and chills for 2-3 days. onset of cough and MCDONALD 2-3 days prior to admission and since then it has been progressively worsening. Started to have chills 1-2 days prior to admission and these symptoms remains persistent and getting worse. Denies syncope, chest pain, nausea, vomiting, abdominal pain/discomfort. Diarrhea reports (1 episode daily) and non-bloody. Cough productive with yellow and white sputum production. Denies having diagnosis of COPD. Former tobacco use quit many years ago.   In the ER Tmax 100.4F, -108, BP 14/83, RR 16-28, SpO2 85% on RA. WBC 9.49, Hgb 11.1, Na 134 otherwise CMP grossly unremarkable. Lactate 1.9, ProBNP 657, Troponin 116.63->149.07.   Left lower lobe nodular opacity likely known lung cancer. Left parahilar   radiation changes. Nonspecific diffuse groundglass throughout the right lung, differential   for which includes pneumonitis or infection. repeat ct chest 5/28 noted    Lung ca s/p chemo RT  suspected pneumonitis  no definite consolidation / pneumonia  adequate oxygenation but hypoxemia on exertion  No hemoptysis or discolored sputum  complete course of antibiotics  slow steroid taper  complete course of steroids and antibiotics  walk test for re eval hypoxemia on exertion  mobilize OOb and re eval oxygenation on exertion  ABG - ( 29 May 2024 14:51 )  pH, Arterial: 7.43  pH, Blood: x     /  pCO2: 36    /  pO2: 83    / HCO3: 24    / Base Excess: -0.1  /  SaO2: 98      Large A-a gradient despite antibiotics and steroids  etiology likley pneumonitis with persistent infiltrates and basilar crackles on exam  will need home o2 set up  switch to po ceftin /lower dose of cefepime IV  Doubt PE with negative CTA and alternate dx available with pneumonitis seen on ct scan  concern wit persistent infiltrates despite treatment  not a good candidate for lung bx  conservative measures appropriate  discussed with hospitalist  all recent films personally reviewed  data discussed with hospitalist  palliative care team noted  re eval for hypoxemia on exertion with walk test while using O2 4liters nc today pls    6/5/24  worsened cxr with increased perihilar infiltrates  CHF maybe cont factor  gentle diuresis suggested  fu films and cont with steroids slow taper suggested  will need Home o2 set up  echo if not recently done    6/6  IVF stopped  gentle diuresis for mild CHF based on exam and cxr  Worsened hyponatremia and suggest renal consult before planned discharge since worsened Na 122 now  cont with steroids  fu cxr after diuresis in am requested  thanks    6/8  Dc planning delayed with worsened hyponatremia  repeat cxr today after diuresis  walk test while on O2 4 liters nc  Qualified for home O2

## 2024-06-09 NOTE — PROGRESS NOTE ADULT - SUBJECTIVE AND OBJECTIVE BOX
Covering for DR Horn     Patient is a 79y Male with  who reports   no complaints overnight.    REVIEW OF SYSTEMS:    CONSTITUTIONAL: No weakness, fevers or chills  RESPIRATORY: No cough, wheezing, hemoptysis; No shortness of breath  CARDIOVASCULAR: No chest pain or palpitations  ABDOMEN: no abd pains, nausea or vomiting  GENITOURINARY: No dysuria, frequency or hematuria  All other review of systems is negative unless indicated above.    Allergies    No Known Allergies    Intolerances        MEDICATIONS  (STANDING):  albuterol    0.083% 2.5 milliGRAM(s) Nebulizer every 6 hours  albuterol    90 MICROgram(s) HFA Inhaler 1 Puff(s) Inhalation every 4 hours  aspirin enteric coated 81 milliGRAM(s) Oral daily  atorvastatin 40 milliGRAM(s) Oral at bedtime  budesonide 160 MICROgram(s)/formoterol 4.5 MICROgram(s) Inhaler 2 Puff(s) Inhalation two times a day  clotrimazole Lozenge 1 Lozenge Oral <User Schedule>  dextrose 10% Bolus 125 milliLiter(s) IV Bolus once  dextrose 5%. 1000 milliLiter(s) (100 mL/Hr) IV Continuous <Continuous>  dextrose 5%. 1000 milliLiter(s) (50 mL/Hr) IV Continuous <Continuous>  dextrose 50% Injectable 25 Gram(s) IV Push once  dextrose 50% Injectable 12.5 Gram(s) IV Push once  enoxaparin Injectable 40 milliGRAM(s) SubCutaneous every 24 hours  glucagon  Injectable 1 milliGRAM(s) IntraMuscular once  insulin glargine Injectable (LANTUS) 5 Unit(s) SubCutaneous every morning  insulin lispro (ADMELOG) corrective regimen sliding scale   SubCutaneous three times a day before meals  insulin lispro (ADMELOG) corrective regimen sliding scale   SubCutaneous at bedtime  insulin lispro Injectable (ADMELOG) 2 Unit(s) SubCutaneous three times a day before meals  lisinopril 10 milliGRAM(s) Oral daily  metoprolol succinate ER 25 milliGRAM(s) Oral daily  polyethylene glycol 3350 17 Gram(s) Oral daily  predniSONE   Tablet 30 milliGRAM(s) Oral daily  senna 2 Tablet(s) Oral at bedtime  sodium chloride 1.5%. 500 milliLiter(s) (50 mL/Hr) IV Continuous <Continuous>  tiotropium 2.5 MICROgram(s) Inhaler 2 Puff(s) Inhalation daily      Vitals:  T(F): 98.7 (06-09-24), Max: 98.7 (06-09-24)  HR: 102 (06-09-24) (77 - 104)  BP: 127/59 (06-09-24) (113/65 - 127/59)  RR: 17 (06-09-24)  SpO2: 92% (06-09-24)    I and O's:            PHYSICAL EXAM:    Constitutional: NAD, frail   HEENT:  EOMI,  MMM  Neck: No JVD  Respiratory: CTAB,CTA   Cardiovascular: S1 and S2  Gastrointestinal: BS+, soft, NT/ND  Extremities: No peripheral edema  Neurological: A/O x 3, no focal deficits  : No Hill  Skin: No rashes  Dialysis Access: Not applicable    LABS:                        10.4   17.05 )-----------( 185      ( 08 Jun 2024 07:08 )             29.6       123    |  91     |  16     ----------------------------<  81        09 Jun 2024 08:14  4.1     |  27     |  0.28     123    |  90     |  20     ----------------------------<  84        08 Jun 2024 07:08  3.8     |  25     |  0.32     122    |  90     |  26     ----------------------------<  224       07 Jun 2024 18:35  4.5     |  24     |  0.67     Ca    8.3        09 Jun 2024 08:14  Ca    8.0        08 Jun 2024 07:08             Serum Osmo: Osmolality, Serum: 270 mosmol/kg *L* [280 - 301] (06-08 @ 13:05)    Serum Uric Acid: Uric Acid: 2.1 mg/dL (06-08 @ 07:08)      Urine Studies:  Urinalysis Basic - ( 09 Jun 2024 08:14 )    Color: x / Appearance: x / SG: x / pH: x  Gluc: 81 mg/dL / Ketone: x  / Bili: x / Urobili: x   Blood: x / Protein: x / Nitrite: x   Leuk Esterase: x / RBC: x / WBC x   Sq Epi: x / Non Sq Epi: x / Bacteria: x        Urine chemistry:   Urine Na: Sodium, Random Urine: 90 mmol/L (06-08 @ 01:30)  Urine Osm: Osmolality, Random Urine: 684 mosm/Kg (06-08 @ 01:30)        RADIOLOGY & ADDITIONAL STUDIES:

## 2024-06-09 NOTE — PROGRESS NOTE ADULT - SUBJECTIVE AND OBJECTIVE BOX
Chief Complaint: Fever, dyspnea, cough    Interval Hx: Patient seen and examined. Patient feels subjectively improved, afebrile, less dyspneic, less cough. Still on supplemental O2, 3-4L, not on home O2. Also with debility, deconditioning.     ROS: Multi system review is comprehensively negative x 10 systems except as above    Vitals:  T(F): 98.7 (09 Jun 2024 08:40), Max: 98.7 (09 Jun 2024 08:40)  HR: 102 (09 Jun 2024 13:20) (77 - 104)  BP: 127/59 (09 Jun 2024 08:40) (113/65 - 127/59)  RR: 17 (09 Jun 2024 08:40) (17 - 18)  SpO2: 92% (09 Jun 2024 13:20) (89% - 95%) on 4L/min    Exam:  Gen: No acute distress  HEENT: PERRL, conjunctiva clear, EOM's intact, non injected pharynx, no exudates  Neck: Supple, no adenopathy, thyroid not enlarged, no carotid bruit or JVD  Back: No tenderness  Lungs: Normal resp effort at rest, bibasilar crackles persist, no wheeze  Heart: S1, S2 normal, RRR  Abdomen: Soft, non-tender, bowel sounds active  Extremities: No edema, tenderness, no synovitis  Skin: Normal skin color, no rashes   Neurologic: Awake and alert, answers simple questions, follows commands    Labs:                        10.4   17.05 )--------( 185              29.6       123  |  91  |  16  -----------------------<  81  4.1   |  27  |  0.28    Ca    8.3    Imaging:  CXR 6/7: Frontal expiratory view of the chest shows the heart to be similar in size. The lungs show slight clearing of interstitial markings in the right lung and progression of interstitial markings in the left lung. There is no evidence of pneumothorax nor definite pleural effusion.    CXR 6/4: Heart magnified by technique. On May 27 there are perihilar infiltrates. On present examination left-sided infiltrates are stable. There are increasing right upper perihilar infiltrates.    CT chest WO 5/28: As compared to CT chest from 5/23, unchanged right greater than left groundglass opacities, which are nonspecific, at least some of which may be related to radiation therapy.   Differential diagnostic considerations also but is not limited to include superimposed inflammatory etiologies. Unchanged nodular opacities including a dominant 2.6 cm left lower lobe   nodular opacity which may correspond to the patient's history of lung cancer. Minimal left pleural fluid with interval decrease in size since May 23, 2024.    CTA chest W/ IV cont 5/23: No pulmonary embolism through the level of the segmental pulmonary arteries. Evaluation of some subsegmental pulmonary arteries is limited. Left lower lobe nodular opacity likely known lung cancer. Left parahilar radiation changes. Nonspecific diffuse groundglass throughout the right lung, differential for which includes pneumonitis or infection.    Cardiac Testing:  TTE 5/24: Left ventricular cavity is normal in size. Left ventricular systolic function is normal with an ejection fraction of 52 % by 3D with an ejection fraction visually estimated at 50 to 55 %. Normal right ventricular cavity size, with normal wall thickness, and normal systolic function. Mild mitral regurgitation. Mild aortic regurgitation.    EKG 5/23: Normal sinus rhythm. Normal ECG    Meds:  MEDICATIONS  (STANDING):  albuterol    0.083% 2.5 milliGRAM(s) Nebulizer every 6 hours  albuterol    90 MICROgram(s) HFA Inhaler 1 Puff(s) Inhalation every 4 hours  aspirin enteric coated 81 milliGRAM(s) Oral daily  atorvastatin 40 milliGRAM(s) Oral at bedtime  budesonide 160 MICROgram(s)/formoterol 4.5 MICROgram(s) Inhaler 2 Puff(s) Inhalation two times a day  clotrimazole Lozenge 1 Lozenge Oral <User Schedule>  enoxaparin Injectable 40 milliGRAM(s) SubCutaneous every 24 hours  insulin glargine Injectable (LANTUS) 5 Unit(s) SubCutaneous every morning  insulin lispro (ADMELOG) corrective regimen sliding scale   SubCutaneous three times a day before meals  insulin lispro (ADMELOG) corrective regimen sliding scale   SubCutaneous at bedtime  insulin lispro Injectable (ADMELOG) 2 Unit(s) SubCutaneous three times a day before meals  lisinopril 10 milliGRAM(s) Oral daily  metoprolol succinate ER 25 milliGRAM(s) Oral daily  polyethylene glycol 3350 17 Gram(s) Oral daily  predniSONE   Tablet 30 milliGRAM(s) Oral daily  senna 2 Tablet(s) Oral at bedtime  sodium chloride 1.5%. 500 milliLiter(s) (50 mL/Hr) IV Continuous <Continuous>  tiotropium 2.5 MICROgram(s) Inhaler 2 Puff(s) Inhalation daily    MEDICATIONS  (PRN):  acetaminophen     Tablet .. 650 milliGRAM(s) Oral every 6 hours PRN Temp greater or equal to 38C (100.4F), Mild Pain (1 - 3)  aluminum hydroxide/magnesium hydroxide/simethicone Suspension 30 milliLiter(s) Oral every 4 hours PRN Dyspepsia  benzonatate 100 milliGRAM(s) Oral every 8 hours PRN Cough  dextrose Oral Gel 15 Gram(s) Oral once PRN Blood Glucose LESS THAN 70 milliGRAM(s)/deciliter  guaiFENesin Oral Liquid (Sugar-Free) 100 milliGRAM(s) Oral every 6 hours PRN Cough  melatonin 3 milliGRAM(s) Oral at bedtime PRN Insomnia  ondansetron Injectable 4 milliGRAM(s) IV Push every 8 hours PRN Nausea and/or Vomiting

## 2024-06-09 NOTE — PROGRESS NOTE ADULT - ASSESSMENT
79 man with HLD, hx of lung cancer s/p chemoradiation, last dose of both as per patient was in April 2024, p/w dyspnea, cough, fever, chills for 2-3 days, progressively worsened. Cough was productive of whitish yellow sputum. In the ED Tm 100.4. HR 100s. RR up to 28. O2 85% on room air. WBC 9.49. Hgb 11.1. Na 134 otherwise CMP grossly unremarkable. Lactate 1.9, ProBNP 657, Troponin 116.63->149.07. CTA with no pulmonary embolism through the level of the segmental pulmonary arteries. Evaluation of some subsegmental pulmonary arteries was limited. Left lower lobe nodular opacity likely known lung cancer. Left parahilar radiation changes. Nonspecific diffuse groundglass throughout the right lung, differential for which included pneumonitis or infection. Admitted to Medicine.    Severe sepsis due to pneumonia, unable to rule out Gram-negative organism  Has underlying lung cancer s/p chemoradiation treatment, last treatment 4/2024 per patient. Cultures negative to date  - Completed course of cefepime and doxycycline    Acute hypoxic respiratory failure  Continues to require supplemental O2 despite course of broad spectrum antibiotics. May be related to pneumonitis. Qualified for home O2.  - Continue O2 supplementation  - Prednisone taper, albuterol, Symbicort    Elevated troponin  Likely myocardial demand ischemia without infarction. 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 Nephrology evaluation noted, NaCl tabs started BID, still  Na not improved. Likely worsening by poor osmolar intake.   - Increase po intake   - Fluid restriction, limit free water intake  - Trial of 1.5% of HTS =- repeat Na later   - Repeat AM labs  .  Prediabetes with hyperglycemia due to steroids  A1c 6.4%  - Monitor glucose   - Continue insullin Lantus, correctional scale, titrate accordingly.     Severe protein calorie malnutrition  Appreciate dietician input  - Add thiamine and MVI daily  - Ensure Plus High Protein TID       Commode  3:1 commode;  pt will require a 3:1 commode as pt incapable of ambulating to bathroom as patient is confined to a single room without a bathroom.    Transport Wheelchair  This patient has a mobility limitation that significantly impairs the patients ability to participate in one or more MRADL's such as: toileting, eating, dressing and bathing in customary locations in the home. Patient's home provides adequate access between rooms for the use of the manual wheelchair. The wheelchair will significantly improve patient's ability to participate in MRADL's and will be used on a regular basis in the home. The patient's mobility limitation cannot be resolved by the use of a walker or cane. This patient does not have the upper extremity function to safely propel the manual wheelchair. Patient has a 24/7 care giver in the home who is willing to propel the wheelchair at any given time. The patient has agreed to use the wheelchair on a daily basis in the home. Diagnosis: Lung Cancer, Pneumonitis, weakness, FTT, COPD        79 man with HLD, hx of lung cancer s/p chemoradiation, last dose of both as per patient was in April 2024, p/w dyspnea, cough, fever, chills for 2-3 days, progressively worsened. Cough was productive of whitish yellow sputum. In the ED Tm 100.4. HR 100s. RR up to 28. O2 85% on room air. WBC 9.49. Hgb 11.1. Na 134 otherwise CMP grossly unremarkable. Lactate 1.9, ProBNP 657, Troponin 116.63->149.07. CTA with no pulmonary embolism through the level of the segmental pulmonary arteries. Evaluation of some subsegmental pulmonary arteries was limited. Left lower lobe nodular opacity likely known lung cancer. Left parahilar radiation changes. Nonspecific diffuse groundglass throughout the right lung, differential for which included pneumonitis or infection. Admitted to Medicine.    Severe sepsis due to pneumonia, unable to rule out Gram-negative organism  Has underlying lung cancer s/p chemoradiation treatment, last treatment 4/2024 per patient. Cultures negative to date  - Completed course of cefepime and doxycycline    Acute hypoxic respiratory failure  Continues to require supplemental O2 despite course of broad spectrum antibiotics. May be related to pneumonitis. Qualified for home O2.  - Continue O2 supplementation  - Prednisone taper, albuterol, Symbicort    Elevated troponin  Likely myocardial demand ischemia without infarction. 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 Nephrology evaluation noted, NaCl tabs started BID, still  Na not improved. Likely worsening by poor osmolar intake.   - Increase po intake   - Fluid restriction, limit free water intake  - Trial of 1.5% of HTS =- repeat Na later   - Repeat AM labs  .  Prediabetes with hyperglycemia due to steroids  A1c 6.4%  - Monitor glucose   - Continue insullin Lantus, correctional scale, titrate accordingly.     Severe protein calorie malnutrition  Appreciate dietician input  - Add thiamine and MVI daily  - Ensure Plus High Protein TID       Commode  3:1 commode;  pt will require a 3:1 commode as pt incapable of ambulating to bathroom as patient is confined to a single room without a bathroom.    Transport Wheelchair  This patient has a mobility limitation that significantly impairs the patients ability to participate in one or more MRADL's such as: toileting, eating, dressing and bathing in customary locations in the home. Patient's home provides adequate access between rooms for the use of the manual wheelchair. The wheelchair will significantly improve patient's ability to participate in MRADL's and will be used on a regular basis in the home. The patient's mobility limitation cannot be resolved by the use of a walker or cane. This patient does not have the upper extremity function to safely propel the manual wheelchair. Patient has a 24/7 care giver in the home who is willing to propel the wheelchair at any given time. The patient has agreed to use the wheelchair on a daily basis in the home. Diagnosis: Lung Cancer, Pneumonitis, weakness, FTT, COPD           53 minutes spent on total encounter. The necessity of the time spent during the encounter on this date of service was due to reviewing chart notes and data, seeing and evaluating patient, coordinating care including discussing case with consultants from Pulmonary Medicine, Nephrology, formulating and executing care plan with placement of new orders, and documenting today's visit, findings and plan.

## 2024-06-09 NOTE — PROGRESS NOTE ADULT - ASSESSMENT
79M with PMH of Lung Ca s/p chemoradiation (last dose of both as per patient in April 2024) and HLD presents with SOB/Cough/Fevers and chills for 2-3 days. onset of cough and MCDONALD 2-3 days prior to admission and since then it has been progressively worsening. Started to have chills 1-2 days prior to admission and these symptoms remains persistent and getting worse. Denies syncope, chest pain, nausea, vomiting, abdominal pain/discomfort. Diarrhea reports (1 episode daily) and non-bloody. Cough productive with yellow and white sputum production. Denies having diagnosis of COPD. Former tobacco use quit many years ago.   In the ER Tmax 100.4F, -108, BP 14/83, RR 16-28, SpO2 85% on RA. WBC 9.49, Hgb 11.1, Na 134 otherwise CMP grossly unremarkable. Lactate 1.9, ProBNP 657, Troponin 116.63->149.07.       Hyponatremia -  SIADH based on labs , urine    worsening by poor osmolar intake   increase po intake    limit free water   no response to Na Cl tabs   trial of 1.5% of HTS =- repeat Na later    Fluid restriction     repeat AM labs     d/w Dr Dilma Agee et al to resume care Monday

## 2024-06-10 NOTE — CONSULT NOTE ADULT - REASON FOR ADMISSION
Cough/SOB/Fevers

## 2024-06-10 NOTE — CONSULT NOTE ADULT - SUBJECTIVE AND OBJECTIVE BOX
ICU  Note    HPI:    S:    Pt seen and examined  79M with PMH of Lung Ca s/p chemoradiation (last dose of both as per patient in April 2024) and HLD presents with SOB/Cough/Fevers and chills for 2-3 days. onset of cough and MCDONALD 2-3 days prior to admission and since then it has been progressively worsening. Started to have chills 1-2 days prior to admission and these symptoms remains persistent and getting worse. Denies syncope, chest pain, nausea, vomiting, abdominal pain/discomfort. Diarrhea reports (1 episode daily) and non-bloody. Cough productive with yellow and white sputum production. Denies having diagnosis of COPD. Former tobacco use quit many years ago.     HD # [id number]  Has been tx for PNA, pneumonitis with steroids    ICU consult for AHRF    6/10: On 50% VM, Sp02 95%; not in distress sitting in bed. per bedside RN, no exertional capacity. Imaging reviewed.    ROS: + SOB, cough  Remainder of systems reviewed, negative        Allergies    No Known Allergies    Intolerances        MEDICATIONS  (STANDING):  albuterol    0.083% 2.5 milliGRAM(s) Nebulizer every 6 hours  albuterol    90 MICROgram(s) HFA Inhaler 1 Puff(s) Inhalation every 4 hours  aspirin enteric coated 81 milliGRAM(s) Oral daily  atorvastatin 40 milliGRAM(s) Oral at bedtime  budesonide 160 MICROgram(s)/formoterol 4.5 MICROgram(s) Inhaler 2 Puff(s) Inhalation two times a day  clotrimazole Lozenge 1 Lozenge Oral <User Schedule>  demeclocycline 300 milliGRAM(s) Oral two times a day  dextrose 10% Bolus 125 milliLiter(s) IV Bolus once  dextrose 5%. 1000 milliLiter(s) (50 mL/Hr) IV Continuous <Continuous>  dextrose 5%. 1000 milliLiter(s) (100 mL/Hr) IV Continuous <Continuous>  dextrose 50% Injectable 25 Gram(s) IV Push once  dextrose 50% Injectable 12.5 Gram(s) IV Push once  enoxaparin Injectable 40 milliGRAM(s) SubCutaneous every 24 hours  glucagon  Injectable 1 milliGRAM(s) IntraMuscular once  insulin glargine Injectable (LANTUS) 5 Unit(s) SubCutaneous every morning  insulin lispro (ADMELOG) corrective regimen sliding scale   SubCutaneous three times a day before meals  insulin lispro (ADMELOG) corrective regimen sliding scale   SubCutaneous at bedtime  insulin lispro Injectable (ADMELOG) 2 Unit(s) SubCutaneous three times a day before meals  lisinopril 10 milliGRAM(s) Oral daily  methylPREDNISolone sodium succinate Injectable 40 milliGRAM(s) IV Push every 12 hours  metoprolol succinate ER 25 milliGRAM(s) Oral daily  multivitamin/minerals 1 Tablet(s) Oral daily  polyethylene glycol 3350 17 Gram(s) Oral daily  senna 2 Tablet(s) Oral at bedtime  sodium chloride 1 Gram(s) Oral three times a day  sodium chloride 1.5%. 500 milliLiter(s) (50 mL/Hr) IV Continuous <Continuous>  thiamine 100 milliGRAM(s) Oral daily  tiotropium 2.5 MICROgram(s) Inhaler 2 Puff(s) Inhalation daily    MEDICATIONS  (PRN):  acetaminophen     Tablet .. 650 milliGRAM(s) Oral every 6 hours PRN Temp greater or equal to 38C (100.4F), Mild Pain (1 - 3)  aluminum hydroxide/magnesium hydroxide/simethicone Suspension 30 milliLiter(s) Oral every 4 hours PRN Dyspepsia  benzonatate 100 milliGRAM(s) Oral every 8 hours PRN Cough  dextrose Oral Gel 15 Gram(s) Oral once PRN Blood Glucose LESS THAN 70 milliGRAM(s)/deciliter  guaiFENesin Oral Liquid (Sugar-Free) 100 milliGRAM(s) Oral every 6 hours PRN Cough  melatonin 3 milliGRAM(s) Oral at bedtime PRN Insomnia  ondansetron Injectable 4 milliGRAM(s) IV Push every 8 hours PRN Nausea and/or Vomiting      Drug Dosing Weight  Height (cm): 172.7 (23 May 2024 09:10)  Weight (kg): 72.1 (23 May 2024 09:10)  BMI (kg/m2): 24.2 (23 May 2024 09:10)  BSA (m2): 1.85 (23 May 2024 09:10)        PAST MEDICAL & SURGICAL HISTORY:  HLD (hyperlipidemia)      Hyperlipemia      Lung cancer      No significant past surgical history          FAMILY HISTORY:  No pertinent family history in first degree relatives          REVIEW OF SYSTEMS    UNLESS OTHERWISE NOTED IN HPI above:    Constitutional:  No Weight Change, No Fever, No Chills, No Night Sweats, No Fatigue, No Malaise  ENT/Mouth:  No Hearing Changes, No Ear Pain, No Nasal Congestion, No  Sinus Pain, No Hoarseness, No sore throat, No Rhinorrhea, No Swallowing  Difficulty  Eyes:  No Eye Pain, No Swelling, No Redness, No Foreign Body, No Discharge, No Vision Changes  Cardiovascular:  No Chest Pain, No SOB, No PND, No Dyspnea on Exertion,  No Orthopnea, No Claudication, No Edema, No Palpitations  Respiratory:  No Cough, No Sputum, No Wheezing, No Smoke Exposure, No Dyspnea  Gastrointestinal:  No Nausea, No Vomiting, No Diarrhea, No  Constipation, No Pain, No Heartburn, No Anorexia, No Dysphagia, No  Hematochezia, No Melena, No Flatulence, No Jaundice  Genitourinary:  No Dysmenorrhea, No DUB, No Dyspareunia, No Dysuria, No  Urinary Frequency, No Hematuria, No Urinary Incontinence, No Urgency,  No Flank Pain, No Urinary Flow Changes, No Hesitancy  Musculoskeletal:  No Arthralgias, No Myalgias, No Joint Swelling, No  Joint Stiffness, No Back Pain, No Neck Pain, No Injury History  Skin:  No Skin Lesions, No Pruritis, No Hair Changes, No Breast/Skin Changes, No Nipple Discharge  Neuro:  No Weakness, No Numbness, No Paresthesias, No Loss of  Consciousness, No Syncope, No Dizziness, No Headache, No Coordination  Changes, No Recent Falls  Psych:  No Anxiety/Panic, No Depression, No Insomnia, No Personality  Changes, No Delusions, No Rumination, No SI/HI/AH/VH, No Social Issues,  No Memory Changes, No Violence/Abuse Hx., No Eating Concerns  Heme/Lymph:  No Bruising, No Bleeding, No Transfusions History, No Lymphadenopathy  Endocrine:  No Polyuria, No Polydipsia, No Temperature Intolerance    O:    ICU Vital Signs Last 24 Hrs  T(C): 36.7 (10 Shaka 2024 15:20), Max: 36.7 (10 Shaka 2024 08:17)  T(F): 98.1 (10 Shaka 2024 15:20), Max: 98.1 (10 Shaka 2024 15:20)  HR: 107 (10 Shaka 2024 15:20) (74 - 107)  BP: 108/78 (10 Shaka 2024 15:20) (108/78 - 129/64)  BP(mean): 86 (10 Shaka 2024 15:20) (86 - 86)  ABP: --  ABP(mean): --  RR: 19 (10 Shaka 2024 15:20) (18 - 22)  SpO2: 93% (10 Shaka 2024 15:20) (93% - 98%)    O2 Parameters below as of 10 Shaka 2024 15:20  Patient On (Oxygen Delivery Method): mask, Venturi  O2 Flow (L/min): 15  O2 Concentration (%): 50            I&O's Detail    09 Jun 2024 07:01  -  10 Shaka 2024 07:00  --------------------------------------------------------  IN:  Total IN: 0 mL    OUT:    Voided (mL): 600 mL  Total OUT: 600 mL    Total NET: -600 mL      10 Shaka 2024 07:01  -  10 Shaka 2024 18:06  --------------------------------------------------------  IN:    Oral Fluid: 400 mL  Total IN: 400 mL    OUT:  Total OUT: 0 mL    Total NET: 400 mL              PE:    Adult lying in bed  Chronically ill appearing M  No JVD trachea midline  Normocephalic, atraumatic  S1S2+  + bibasillar crackles  Abd soft NTND  No leg swelling/edema noted  Awake and alert  Skin pink, warm    LABS:      06-10    123<L>  |  91<L>  |  15  ----------------------------<  83  4.0   |  27  |  0.28<L>    Ca    8.4<L>      10 Shaka 2024 06:52        Urinalysis Basic - ( 10 Shaka 2024 06:52 )    Color: x / Appearance: x / SG: x / pH: x  Gluc: 83 mg/dL / Ketone: x  / Bili: x / Urobili: x   Blood: x / Protein: x / Nitrite: x   Leuk Esterase: x / RBC: x / WBC x   Sq Epi: x / Non Sq Epi: x / Bacteria: x      CAPILLARY BLOOD GLUCOSE      POCT Blood Glucose.: 219 mg/dL (10 Shaka 2024 16:20)  POCT Blood Glucose.: 207 mg/dL (10 Shaka 2024 11:56)  POCT Blood Glucose.: 95 mg/dL (10 Shaka 2024 08:00)  POCT Blood Glucose.: 142 mg/dL (09 Jun 2024 22:20)

## 2024-06-10 NOTE — PROGRESS NOTE ADULT - ASSESSMENT
79M with PMH of Lung Ca s/p chemoradiation (last dose of both as per patient in April 2024) and HLD presents with SOB/Cough/Fevers and chills for 2-3 days. onset of cough and MCDONALD 2-3 days prior to admission and since then it has been progressively worsening. Started to have chills 1-2 days prior to admission and these symptoms remains persistent and getting worse. Denies syncope, chest pain, nausea, vomiting, abdominal pain/discomfort. Diarrhea reports (1 episode daily) and non-bloody. Cough productive with yellow and white sputum production. Denies having diagnosis of COPD. Former tobacco use quit many years ago.   In the ER Tmax 100.4F, -108, BP 14/83, RR 16-28, SpO2 85% on RA. WBC 9.49, Hgb 11.1, Na 134 otherwise CMP grossly unremarkable. Lactate 1.9, ProBNP 657, Troponin 116.63->149.07.   Left lower lobe nodular opacity likely known lung cancer. Left parahilar   radiation changes. Nonspecific diffuse groundglass throughout the right lung, differential   for which includes pneumonitis or infection. repeat ct chest 5/28 noted    Lung ca s/p chemo RT  suspected pneumonitis  no definite consolidation / pneumonia  adequate oxygenation but hypoxemia on exertion  No hemoptysis or discolored sputum  complete course of antibiotics  slow steroid taper  complete course of steroids and antibiotics  walk test for re eval hypoxemia on exertion  mobilize OOb and re eval oxygenation on exertion  ABG - ( 29 May 2024 14:51 )  pH, Arterial: 7.43  pH, Blood: x     /  pCO2: 36    /  pO2: 83    / HCO3: 24    / Base Excess: -0.1  /  SaO2: 98      Large A-a gradient despite antibiotics and steroids  etiology likley pneumonitis with persistent infiltrates and basilar crackles on exam  will need home o2 set up  switch to po ceftin /lower dose of cefepime IV  Doubt PE with negative CTA and alternate dx available with pneumonitis seen on ct scan  concern wit persistent infiltrates despite treatment  not a good candidate for lung bx  conservative measures appropriate  discussed with hospitalist  all recent films personally reviewed  data discussed with hospitalist  palliative care team noted  re eval for hypoxemia on exertion with walk test while using O2 4liters nc today pls    Dc planning delayed with worsened hyponatremia  repeat cxr today after diuresis noted  walk test while on O2 4 liters nc  Qualified for home O2

## 2024-06-10 NOTE — CONSULT NOTE ADULT - CONSULT REQUESTED DATE/TIME
10-Shaka-2024 18:06
25-May-2024 10:21
24-May-2024 10:38
24-May-2024 18:33
07-Jun-2024 15:36
24-May-2024 22:41
30-May-2024 11:12

## 2024-06-10 NOTE — PROGRESS NOTE ADULT - ASSESSMENT
79M with PMH of Lung Ca s/p chemoradiation (last dose of both as per patient in April 2024) and HLD presents with SOB/Cough/Fevers and chills for 2-3 days. onset of cough and MCDONALD 2-3 days prior to admission and since then it has been progressively worsening. Started to have chills 1-2 days prior to admission and these symptoms remains persistent and getting worse. Denies syncope, chest pain, nausea, vomiting, abdominal pain/discomfort. Diarrhea reports (1 episode daily) and non-bloody. Cough productive with yellow and white sputum production. Denies having diagnosis of COPD. Former tobacco use quit many years ago.     In the ER Tmax 100.4F, -108, BP 14/83, RR 16-28, SpO2 85% on RA. WBC 9.49, Hgb 11.1, Na 134 otherwise CMP grossly unremarkable. Lactate 1.9, ProBNP 657, Troponin 116.63->149.07.     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    Repeat urine studies to confirm.    With poor resp status and new left pleural effusion, hold further hypertonic fluid,    Trial of NACL tabs and Demeclocycline for now.

## 2024-06-10 NOTE — PROGRESS NOTE ADULT - SUBJECTIVE AND OBJECTIVE BOX
NEPHROLOGY INTERVAL HPI/OVERNIGHT EVENTS:    Date of Service: 06-10-24 @ 15:30    6/10--Hypoxic this morning.  CTA done --negative for PE.  diffuse bilateral ground glass opacities and new small left pleural effusion.  Serum sodium remains low without significant response to 1.5 % NS yesterday.  Daughter at bedside reports pt eats fairly ok.      HPI:  79M with PMH of Lung Ca s/p chemoradiation (last dose of both as per patient in April 2024) and HLD presents with SOB/Cough/Fevers and chills for 2-3 days. onset of cough and MCDONALD 2-3 days prior to admission and since then it has been progressively worsening. Started to have chills 1-2 days prior to admission and these symptoms remains persistent and getting worse. Denies syncope, chest pain, nausea, vomiting, abdominal pain/discomfort. Diarrhea reports (1 episode daily) and non-bloody. Cough productive with yellow and white sputum production. Denies having diagnosis of COPD. Former tobacco use quit many years ago.     In the ER Tmax 100.4F, -108, BP 14/83, RR 16-28, SpO2 85% on RA. WBC 9.49, Hgb 11.1, Na 134 otherwise CMP grossly unremarkable. Lactate 1.9, ProBNP 657, Troponin 116.63->149.07.     Nephrology  Pt w above hx as per chart  Renal consult requested for hyponatremia  Pt has CT/ CXR finding c/w GGO, pneumonitis/ infection  Was on diuretics now dc d due developing hyponatremia  Since dc d yesterday, Na improved 122 to 125  d/e Dr Perera      MEDICATIONS  (STANDING):  albuterol    0.083% 2.5 milliGRAM(s) Nebulizer every 6 hours  albuterol    90 MICROgram(s) HFA Inhaler 1 Puff(s) Inhalation every 4 hours  aspirin enteric coated 81 milliGRAM(s) Oral daily  atorvastatin 40 milliGRAM(s) Oral at bedtime  budesonide 160 MICROgram(s)/formoterol 4.5 MICROgram(s) Inhaler 2 Puff(s) Inhalation two times a day  clotrimazole Lozenge 1 Lozenge Oral <User Schedule>  dextrose 10% Bolus 125 milliLiter(s) IV Bolus once  dextrose 5%. 1000 milliLiter(s) (50 mL/Hr) IV Continuous <Continuous>  dextrose 5%. 1000 milliLiter(s) (100 mL/Hr) IV Continuous <Continuous>  dextrose 50% Injectable 25 Gram(s) IV Push once  dextrose 50% Injectable 12.5 Gram(s) IV Push once  enoxaparin Injectable 40 milliGRAM(s) SubCutaneous every 24 hours  glucagon  Injectable 1 milliGRAM(s) IntraMuscular once  insulin glargine Injectable (LANTUS) 5 Unit(s) SubCutaneous every morning  insulin lispro (ADMELOG) corrective regimen sliding scale   SubCutaneous three times a day before meals  insulin lispro (ADMELOG) corrective regimen sliding scale   SubCutaneous at bedtime  insulin lispro Injectable (ADMELOG) 2 Unit(s) SubCutaneous three times a day before meals  lisinopril 10 milliGRAM(s) Oral daily  metoprolol succinate ER 25 milliGRAM(s) Oral daily  multivitamin/minerals 1 Tablet(s) Oral daily  polyethylene glycol 3350 17 Gram(s) Oral daily  predniSONE   Tablet 30 milliGRAM(s) Oral daily  senna 2 Tablet(s) Oral at bedtime  sodium chloride 1.5%. 500 milliLiter(s) (50 mL/Hr) IV Continuous <Continuous>  thiamine 100 milliGRAM(s) Oral daily  tiotropium 2.5 MICROgram(s) Inhaler 2 Puff(s) Inhalation daily    MEDICATIONS  (PRN):  acetaminophen     Tablet .. 650 milliGRAM(s) Oral every 6 hours PRN Temp greater or equal to 38C (100.4F), Mild Pain (1 - 3)  aluminum hydroxide/magnesium hydroxide/simethicone Suspension 30 milliLiter(s) Oral every 4 hours PRN Dyspepsia  benzonatate 100 milliGRAM(s) Oral every 8 hours PRN Cough  dextrose Oral Gel 15 Gram(s) Oral once PRN Blood Glucose LESS THAN 70 milliGRAM(s)/deciliter  guaiFENesin Oral Liquid (Sugar-Free) 100 milliGRAM(s) Oral every 6 hours PRN Cough  melatonin 3 milliGRAM(s) Oral at bedtime PRN Insomnia  ondansetron Injectable 4 milliGRAM(s) IV Push every 8 hours PRN Nausea and/or Vomiting    Vital Signs Last 24 Hrs  T(C): 36.7 (10 Shaka 2024 15:20), Max: 36.7 (10 Shaka 2024 08:17)  T(F): 98.1 (10 Shaka 2024 15:20), Max: 98.1 (10 Shaka 2024 15:20)  HR: 107 (10 Shaka 2024 15:20) (74 - 107)  BP: 108/78 (10 Shaka 2024 15:20) (108/78 - 129/64)  BP(mean): 86 (10 Shaka 2024 15:20) (86 - 86)  RR: 19 (10 Shaka 2024 15:20) (18 - 22)  SpO2: 93% (10 Shaka 2024 15:20) (93% - 98%)    Parameters below as of 10 Shaka 2024 15:20  Patient On (Oxygen Delivery Method): mask, Venturi  O2 Flow (L/min): 15  O2 Concentration (%): 50  Daily     Daily     06-09 @ 07:01  -  06-10 @ 07:00  --------------------------------------------------------  IN: 0 mL / OUT: 600 mL / NET: -600 mL    06-10 @ 07:01  -  06-10 @ 15:30  --------------------------------------------------------  IN: 400 mL / OUT: 0 mL / NET: 400 mL    PHYSICAL EXAM:  GENERAL: no distress  CHEST/LUNG: scattered rhonchi  HEART: S1S2 RRR  ABDOMEN: soft  EXTREMITIES: no edema  SKIN:     LABS:    06-10    123<L>  |  91<L>  |  15  ----------------------------<  83  4.0   |  27  |  0.28<L>    Ca    8.4<L>      10 Shaka 2024 06:52        Urinalysis Basic - ( 10 Shaka 2024 06:52 )    Color: x / Appearance: x / SG: x / pH: x  Gluc: 83 mg/dL / Ketone: x  / Bili: x / Urobili: x   Blood: x / Protein: x / Nitrite: x   Leuk Esterase: x / RBC: x / WBC x   Sq Epi: x / Non Sq Epi: x / Bacteria: x              RADIOLOGY & ADDITIONAL TESTS:

## 2024-06-10 NOTE — PROGRESS NOTE ADULT - SUBJECTIVE AND OBJECTIVE BOX
Chief Complaint: Fever, dyspnea, cough    Interval Hx: Patient seen and examined. Patient feels worse today with relative increase dyspnea as compared to prior couple of days. No other new complaints. No new fever. No new cough. He had been improving and his symptoms were resolving and just dealing with hyponatremia. Now he has this increase in dyspnea and increase in O2 requirement. Obtained CTA chest. No main, lobar or proximal segmental pulmonary embolus. The distal segmental/subsegmental branches of the pulmonary arteries were not evaluated secondary to motion. In comparison with 5/28/2024, stable left perihilar nodular opacity. Stable diffuse bilateral ground-glass opacities. New small left pleural effusion. Ordered for IV Lasix x 1 dose. Nephrology ordered demeclocycline.      ROS: Multi system review is comprehensively negative x 10 systems except as above    Vitals:  T(F): 98.1 (10 Shaka 2024 15:20), Max: 98.1 (10 Shaka 2024 15:20)  HR: 107 (10 Shaka 2024 15:20) (74 - 107)  BP: 108/78 (10 Shaka 2024 15:20) (108/78 - 129/64)  BP(mean): 86 (10 Shaka 2024 15:20) (86 - 86)  RR: 19 (10 Shaka 2024 15:20) (18 - 22)  SpO2: 93% (10 Shaka 2024 15:20) (93% - 98%) 50% ventimast    Exam:  Gen: No acute distress  HEENT: PERRL, conjunctiva clear, EOM's intact, non injected pharynx, no exudates  Neck: Supple, no adenopathy, thyroid not enlarged, no carotid bruit or JVD  Back: No tenderness  Lungs: Normal resp effort at rest, bibasilar crackles persist, no wheeze  Heart: S1, S2 normal, RRR  Abdomen: Soft, non-tender, bowel sounds active  Extremities: No edema, tenderness, no synovitis  Skin: Normal skin color, no rashes   Neurologic: Awake and alert, answers simple questions, follows commands    Labs:             POCT glucose 140s-200s               10.4   17.05 )--------( 185              29.6       123  |  91  |  15  ---------------------<  83  4.0   |  27  |  0.28    Ca  8.4    Imaging:  CTA chest PE protocol W/ IV cont 6/10: No main, lobar or proximal segmental pulmonary embolus. The distal segmental/subsegmental branches of the pulmonary arteries were not evaluated secondary to motion. In comparison with 5/28/2024, stable left perihilar nodular opacity. Stable diffuse bilateral ground-glass opacities. New small left pleural effusion.     CXR 6/7: Frontal expiratory view of the chest shows the heart to be similar in size. The lungs show slight clearing of interstitial markings in the right lung and progression of interstitial markings in the left lung. There is no evidence of pneumothorax nor definite pleural effusion.    CXR 6/4: Heart magnified by technique. On May 27 there are perihilar infiltrates. On present examination left-sided infiltrates are stable. There are increasing right upper perihilar infiltrates.    CT chest WO 5/28: As compared to CT chest from 5/23, unchanged right greater than left groundglass opacities, which are nonspecific, at least some of which may be related to radiation therapy.   Differential diagnostic considerations also but is not limited to include superimposed inflammatory etiologies. Unchanged nodular opacities including a dominant 2.6 cm left lower lobe   nodular opacity which may correspond to the patient's history of lung cancer. Minimal left pleural fluid with interval decrease in size since May 23, 2024.    CTA chest W/ IV cont 5/23: No pulmonary embolism through the level of the segmental pulmonary arteries. Evaluation of some subsegmental pulmonary arteries is limited. Left lower lobe nodular opacity likely known lung cancer. Left parahilar radiation changes. Nonspecific diffuse groundglass throughout the right lung, differential for which includes pneumonitis or infection.    Cardiac Testing:  TTE 5/24: Left ventricular cavity is normal in size. Left ventricular systolic function is normal with an ejection fraction of 52 % by 3D with an ejection fraction visually estimated at 50 to 55 %. Normal right ventricular cavity size, with normal wall thickness, and normal systolic function. Mild mitral regurgitation. Mild aortic regurgitation.    EKG 5/23: Normal sinus rhythm. Normal ECG    Meds:  MEDICATIONS  (STANDING):  albuterol    0.083% 2.5 milliGRAM(s) Nebulizer every 6 hours  albuterol    90 MICROgram(s) HFA Inhaler 1 Puff(s) Inhalation every 4 hours  aspirin enteric coated 81 milliGRAM(s) Oral daily  atorvastatin 40 milliGRAM(s) Oral at bedtime  budesonide 160 MICROgram(s)/formoterol 4.5 MICROgram(s) Inhaler 2 Puff(s) Inhalation two times a day  clotrimazole Lozenge 1 Lozenge Oral <User Schedule>  demeclocycline 300 milliGRAM(s) Oral two times a day  enoxaparin Injectable 40 milliGRAM(s) SubCutaneous every 24 hours  furosemide   Injectable 40 milliGRAM(s) IV Push once  insulin glargine Injectable (LANTUS) 5 Unit(s) SubCutaneous every morning  insulin lispro (ADMELOG) corrective regimen sliding scale   SubCutaneous three times a day before meals  insulin lispro (ADMELOG) corrective regimen sliding scale   SubCutaneous at bedtime  insulin lispro Injectable (ADMELOG) 2 Unit(s) SubCutaneous three times a day before meals  lisinopril 10 milliGRAM(s) Oral daily  metoprolol succinate ER 25 milliGRAM(s) Oral daily  multivitamin/minerals 1 Tablet(s) Oral daily  polyethylene glycol 3350 17 Gram(s) Oral daily  predniSONE   Tablet 30 milliGRAM(s) Oral daily  senna 2 Tablet(s) Oral at bedtime  sodium chloride 1 Gram(s) Oral three times a day  sodium chloride 1.5%. 500 milliLiter(s) (50 mL/Hr) IV Continuous <Continuous>  thiamine 100 milliGRAM(s) Oral daily  tiotropium 2.5 MICROgram(s) Inhaler 2 Puff(s) Inhalation daily    MEDICATIONS  (PRN):  acetaminophen     Tablet .. 650 milliGRAM(s) Oral every 6 hours PRN Temp greater or equal to 38C (100.4F), Mild Pain (1 - 3)  aluminum hydroxide/magnesium hydroxide/simethicone Suspension 30 milliLiter(s) Oral every 4 hours PRN Dyspepsia  benzonatate 100 milliGRAM(s) Oral every 8 hours PRN Cough  dextrose Oral Gel 15 Gram(s) Oral once PRN Blood Glucose LESS THAN 70 milliGRAM(s)/deciliter  guaiFENesin Oral Liquid (Sugar-Free) 100 milliGRAM(s) Oral every 6 hours PRN Cough  melatonin 3 milliGRAM(s) Oral at bedtime PRN Insomnia  ondansetron Injectable 4 milliGRAM(s) IV Push every 8 hours PRN Nausea and/or Vomiting

## 2024-06-10 NOTE — CONSULT NOTE ADULT - ASSESSMENT
Stage III lung cancer  S/P neoadjuvant Immune therapy + Chemotherapy with a good response  S/P Taxol/ Carb + RT  Now with pneumonia / pneumonitis ? RT related ? prior Immune therapy related  Anemia   Elevated Troponin         PLAN    Antibiotics/ steroids as per Pulm / medicine  No evidence of progressive cancer  Procrit for progressive anemia 
A:    79M    Here for:    1. AHRF 2/2  2. Pneumonitis  3. PNA  4. 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     Discussed with Bernard.    Discussed with bedside RN.    Any changes in status please call ICU svc back and will reevaluate.     Date of entry of this note is equal to the date of services rendered    TOTAL CRITICAL CARE TIME:   (EXCLUSIVE of any non bundled procedures)    Note: This is a critically ill patient. Time spent has been in salvage of life, limb and vital organ systems. This time INCLUDES time spent directly as this patient's bedside with evaluation and management, review of chart including review of laboratory and imaging studies, interpretation of vital signs and cardiac output measurements, any necessary ventilator management, and time spent discussing plan of care with patient and family, including goals of care discussion. This also includes time spent in multidisciplinary discussion with care team and various consultants to optimize treatment plan. This time may NOT include various procedures, which will be noted seperately.   
79M with PMH of Lung Ca s/p chemoradiation (last dose of both as per patient in April 2024) and HLD presents with SOB/Cough/Fevers and chills for 2-3 days. onset of cough and MCDONALD 2-3 days prior to admission and since then it has been progressively worsening. Started to have chills 1-2 days prior to admission and these symptoms remains persistent and getting worse. Denies syncope, chest pain, nausea, vomiting, abdominal pain/discomfort. Diarrhea reports (1 episode daily) and non-bloody. Cough productive with yellow and white sputum production. Denies having diagnosis of COPD. Former tobacco use quit many years ago.     In the ER Tmax 100.4F, -108, BP 14/83, RR 16-28, SpO2 85% on RA. WBC 9.49, Hgb 11.1, Na 134 otherwise CMP grossly unremarkable. Lactate 1.9, ProBNP 657, Troponin 116.63->149.07.     < from: CT Angio Chest PE Protocol w/ IV Cont (05.23.24 @ 11:34) >  IMPRESSION:    No pulmonary embolism through the level of the segmental pulmonary   arteries. Evaluation of some subsegmental pulmonary arteries is limited.    Left lower lobe nodular opacity likely known lung cancer. Left parahilar   radiation changes.    Nonspecific diffuse groundglass throughout the right lung, differential   for which includes pneumonitis or infection.    Assessment / plan;  Lung ca s/p chemo RT  suspected pneumonitis  no definite consolidation / pneumonia  adequate oxygenation   No hemoptysis or discolored sputum  case discussed with Dr Jerrod chang with you  
79M with PMH of Lung Ca s/p chemoradiation (last dose of both as per patient in April 2024) and HLD presents with SOB/Cough/Fevers and chills for 2-3 days. onset of cough and MCDONALD 2-3 days prior to admission and since then it has been progressively worsening. Started to have chills 1-2 days prior to admission and these symptoms remains persistent and getting worse. Denies syncope, chest pain, nausea, vomiting, abdominal pain/discomfort. Diarrhea reports (1 episode daily) and non-bloody. Cough productive with yellow and white sputum production. Denies having diagnosis of COPD. Former tobacco use quit many years ago.     In the ER Tmax 100.4F, -108, BP 14/83, RR 16-28, SpO2 85% on RA. WBC 9.49, Hgb 11.1, Na 134 otherwise CMP grossly unremarkable. Lactate 1.9, ProBNP 657, Troponin 116.63->149.07.     Nephrology  Pt w above hx as per chart  Renal consult requested for hyponatremia  Pt has CT/ CXR finding c/w GGO, pneumonitis/ infection  Was on diuretics now dc d due developing hyponatremia  Since dc d yesterday, Na improved 122 to 125  d/e Dr Perera  Will limit po fluids and dc Furosemide  NaCl 2 g po x 1  Labs tonight  Dr Arce covering this weekend  
    79M with PMH of Lung Ca s/p chemoradiation (last dose of both as per patient in April 2024) and HLD presents with SOB/Cough/Fevers and chills for 2-3 days. onset of cough and MCDONALD 2-3 days prior to admission and since then it has been progressively worsening. Started to have chills 1-2 days prior to admission and these symptoms remains persistent and getting worse. Denies syncope, chest pain, nausea, vomiting, abdominal pain/discomfort. Diarrhea reports (1 episode daily) and non-bloody. Cough productive with yellow and white sputum production. Denies having diagnosis of COPD. Former tobacco use quit many years ago.   5/30/24 Seen and examined at bedside. Seated in chair with no C/O pain or dyspnea at rest. Endorses dyspnea on exertion.  ID [id number].    Assessment and Plan:    1) Lung Ca  -Stage III lung cancer  -S/P neoadjuvant Immune therapy + Chemotherapy with a good response  -S/P Taxol/ Carb + RT  -Now with pneumonia / pneumonitis ? RT related ? prior Immune therapy related  -Anemia   -Onc eval noted  -No evidence of progression of disease    2) Acute Resp failure  -H/O Lung cancer  -S/P treatment  -? pneumonitis  -CXR and CT noted  -antibiotics as per med  -O2 supplementation  -Albuterol/Symbicort  -IV Solumedrol. Down titrate as tolerated    3) Debility  -Weakness  -Dyspnea  -S/P chemo/rad  -Mod protein villa malnutrition  -Nutrition eval  -PT eval    4) Advanced Directives  -Pt with capacity  -First language Spanis/ ID [id number]  -GOC done  -No limits set  -Transition home when stable    Time Spent: 75 minutes including the care, coordination and counseling of this patient, 50% of which was spent coordinating and counseling.

## 2024-06-10 NOTE — PROGRESS NOTE ADULT - SUBJECTIVE AND OBJECTIVE BOX
Patient is a 79y old  Male who presents with a chief complaint of Cough/SOB/Fevers (24 May 2024 22:41)      HPI:  79M with PMH of Lung Ca s/p chemoradiation (last dose of both as per patient in April 2024) and HLD presents with SOB/Cough/Fevers and chills for 2-3 days. onset of cough and [place]ON[place]D 2-3 days prior to admission and since then it has been progressively worsening. Started to have chills 1-2 days prior to admission and these symptoms remains persistent and getting worse. Denies syncope, chest pain, nausea, vomiting, abdominal pain/discomfort. Diarrhea reports (1 episode daily) and non-bloody. Cough productive with yellow and white sputum production. Denies having diagnosis of COPD. Former tobacco use quit many years ago.   In the ER Tmax 100.4F, -108, BP 14/83, RR 16-28, SpO2 85% on RA. WBC 9.49, Hgb 11.1, Na 134 otherwise CMP grossly unremarkable. Lactate 1.9, ProBNP 657, Troponin 116.63->149.07.   CTA- No pulmonary embolism through the level of the segmental pulmonary   arteries. Evaluation of some subsegmental pulmonary arteries is limited. Left lower lobe nodular opacity likely known lung cancer. Left parahilar   radiation changes. Nonspecific diffuse groundglass throughout the right lung, differential   for which includes pneumonitis or infection.    MEDICATIONS  (STANDING):  albuterol    0.083% 2.5 milliGRAM(s) Nebulizer every 6 hours  albuterol    90 MICROgram(s) HFA Inhaler 1 Puff(s) Inhalation every 4 hours  aspirin enteric coated 81 milliGRAM(s) Oral daily  atorvastatin 40 milliGRAM(s) Oral at bedtime  budesonide 160 MICROgram(s)/formoterol 4.5 MICROgram(s) Inhaler 2 Puff(s) Inhalation two times a day  clotrimazole Lozenge 1 Lozenge Oral <User Schedule>  dextrose 10% Bolus 125 milliLiter(s) IV Bolus once  dextrose 5%. 1000 milliLiter(s) (100 mL/Hr) IV Continuous <Continuous>  dextrose 5%. 1000 milliLiter(s) (50 mL/Hr) IV Continuous <Continuous>  dextrose 50% Injectable 25 Gram(s) IV Push once  dextrose 50% Injectable 12.5 Gram(s) IV Push once  enoxaparin Injectable 40 milliGRAM(s) SubCutaneous every 24 hours  glucagon  Injectable 1 milliGRAM(s) IntraMuscular once  insulin glargine Injectable (LANTUS) 5 Unit(s) SubCutaneous every morning  insulin lispro (ADMELOG) corrective regimen sliding scale   SubCutaneous three times a day before meals  insulin lispro (ADMELOG) corrective regimen sliding scale   SubCutaneous at bedtime  insulin lispro Injectable (ADMELOG) 2 Unit(s) SubCutaneous three times a day before meals  lisinopril 10 milliGRAM(s) Oral daily  metoprolol succinate ER 25 milliGRAM(s) Oral daily  predniSONE   Tablet 40 milliGRAM(s) Oral daily  sodium chloride 1 Gram(s) Oral two times a day  tiotropium 2.5 MICROgram(s) Inhaler 2 Puff(s) Inhalation daily    MEDICATIONS  (PRN):  acetaminophen     Tablet .. 650 milliGRAM(s) Oral every 6 hours PRN Temp greater or equal to 38C (100.4F), Mild Pain (1 - 3)  aluminum hydroxide/magnesium hydroxide/simethicone Suspension 30 milliLiter(s) Oral every 4 hours PRN Dyspepsia  benzonatate 100 milliGRAM(s) Oral every 8 hours PRN Cough  dextrose Oral Gel 15 Gram(s) Oral once PRN Blood Glucose LESS THAN 70 milliGRAM(s)/deciliter  guaiFENesin Oral Liquid (Sugar-Free) 100 milliGRAM(s) Oral every 6 hours PRN Cough  melatonin 3 milliGRAM(s) Oral at bedtime PRN Insomnia  ondansetron Injectable 4 milliGRAM(s) IV Push every 8 hours PRN Nausea and/or Vomiting        Vital Signs Last 24 Hrs  T(C): 36.7 (10 Shaka 2024 08:17), Max: 36.7 (10 Shaka 2024 08:17)  T(F): 98 (10 Shaka 2024 08:17), Max: 98 (10 Shaka 2024 08:17)  HR: 95 (10 Shaka 2024 08:17) (74 - 104)  BP: 129/64 (10 Shaka 2024 08:17) (119/53 - 129/64)  BP(mean): --  RR: 19 (10 Shaka 2024 08:17) (18 - 19)  SpO2: 94% (10 Shaka 2024 08:17) (92% - 98%)    Parameters below as of 10 Shaka 2024 08:17  Patient On (Oxygen Delivery Method): nasal cannula  O2 Flow (L/min): 4    PHYSICAL EXAM  General Appearance: cooperative, no acute distress,   HEENT: PERRL, conjunctiva clear, EOM's intact, non injected pharynx, no exudate, TM   normal  Neck: Supple, , no adenopathy, thyroid: not enlarged, no carotid bruit or JVD  Back: Symmetric, no  tenderness,no soft tissue tenderness  Lungs: bibasilar crackles persist, no wheeze  Heart: Regular rate and rhythm, S1, S2 normal, no murmur, rub or gallop  Abdomen: Soft, non-tender, bowel sounds active , no hepatosplenomegaly  Extremities: no cyanosis or edema, no joint swelling  Skin: Skin color, texture normal, no rashes   Neurologic: Alert and oriented X3 , cranial nerves intact, sensory and motor normal,    ECG:    LABS:06-10    123<L>  |  91<L>  |  15  ----------------------------<  83  4.0   |  27  |  0.28<L>    Ca    8.4<L>      10 Shaka 2024 06:52                            10.8   19.78 )-----------( 228      ( 06 Jun 2024 06:18 )             30.5   06-06    122<L>  |  91<L>  |  25<H>  ----------------------------<  105<H>  3.9   |  25  |  0.36<L>    Ca    7.7<L>      06 Jun 2024 06:18                            10.8   19.78 )-----------( 228      ( 06 Jun 2024 06:18 )             30.5   06-06    122<L>  |  91<L>  |  25<H>  ----------------------------<  105<H>  3.9   |  25  |  0.36<L>    Ca    7.7<L>      06 Jun 2024 06:18                            11.1   18.29 )-----------( 250      ( 04 Jun 2024 06:47 )             31.7   06-03    126<L>  |  93<L>  |  28<H>  ----------------------------<  170<H>  4.4   |  28  |  0.46<L>    Ca    8.7      03 Jun 2024 06:54                            9.4    6.29  )-----------( 236      ( 24 May 2024 06:41 )             27.7     05-24    135  |  103  |  12  ----------------------------<  242<H>  4.2   |  24  |  0.69    Ca    8.6      24 May 2024 06:41  Phos  3.4     05-24  Mg     1.8     05-24    TPro  6.3  /  Alb  2.1<L>  /  TBili  0.5  /  DBili  x   /  AST  18  /  ALT  26  /  AlkPhos  97  05-24              Urinalysis Basic - ( 24 May 2024 06:41 )    Color: x / Appearance: x / SG: x / pH: x  Gluc: 242 mg/dL / Ketone: x  / Bili: x / Urobili: x   Blood: x / Protein: x / Nitrite: x   Leuk Esterase: x / RBC: x / WBC x   Sq Epi: x / Non Sq Epi: x / Bacteria: x      ABG - ( 29 May 2024 14:51 )  pH, Arterial: 7.43  pH, Blood: x     /  pCO2: 36    /  pO2: 83    / HCO3: 24    / Base Excess: -0.1  /  SaO2: 98          < from: Xray Chest 1 View- PORTABLE-Routine (Xray Chest 1 View- PORTABLE-Routine .) (06.04.24 @ 08:56) >  PROCEDURE DATE:  06/04/2024          INTERPRETATION:  AP chest on June 4, 2024 at 8:46 AM. Patient is short of   breath.    Heart magnified by technique.    On May 27 there are perihilar infiltrates.    On present examination left-sided infiltrates are stable. There are   increasing right upper perihilar infiltrates.    IMPRESSION: Stable left-sided infiltrates. Increasing right upper   perihilar infiltrate.    < end of copied text >            RADIOLOGY & ADDITIONAL STUDIES:  < from: CT Angio Chest PE Protocol w/ IV Cont (05.23.24 @ 11:34) >  IMPRESSION:    No pulmonary embolism through the level of the segmental pulmonary   arteries. Evaluation of some subsegmental pulmonary arteries is limited.    Left lower lobe nodular opacity likely known lung cancer. Left parahilar   radiation changes.    Nonspecific diffuse groundglass throughout the right lung, differential   for which includes pneumonitis or infection.    < end of copied text >    Unchanged right greater than left groundglass opacities, which are   nonspecific, at least some of which may be related to radiation therapy.   Differential diagnostic considerations also but is not limited to include   superimposed inflammatory etiologies.    Unchanged nodular opacities including a dominant 2.6 cm left lower lobe   nodular opacity which may correspond to the patient's history of lung   cancer.    Comparison with prior older chest CTs are recommended for complete   evaluation of the above findings and if images are made available, an   addendum can be issued.    Minimal left pleural fluid with interval decrease in size since May 23,   2024.    < from: Xray Chest 1 View- PORTABLE-Routine (Xray Chest 1 View- PORTABLE-Routine in AM.) (06.07.24 @ 07:38) >    Frontal expiratory view of the chest shows the heart to be similar in   size. The lungs show slight clearing of interstitial markings in the   right lung and progression of interstitial markings in the left lung.   There is no evidence of pneumothorax nor definite pleural effusion.    IMPRESSION:  Changing markings.    < end of copied text >

## 2024-06-10 NOTE — PROGRESS NOTE ADULT - ASSESSMENT
79 man with HLD, hx of lung cancer s/p chemoradiation, last dose of both as per patient was in April 2024, p/w dyspnea, cough, fever, chills for 2-3 days, progressively worsened. Cough was productive of whitish yellow sputum. In the ED Tm 100.4. HR 100s. RR up to 28. O2 85% on room air. WBC 9.49. Hgb 11.1. Na 134 otherwise CMP grossly unremarkable. Lactate 1.9, ProBNP 657, Troponin 116.63->149.07. CTA with no pulmonary embolism through the level of the segmental pulmonary arteries. Evaluation of some subsegmental pulmonary arteries was limited. Left lower lobe nodular opacity likely known lung cancer. Left parahilar radiation changes. Nonspecific diffuse groundglass throughout the right lung, differential for which included pneumonitis or infection. Admitted to Medicine.    Severe sepsis due to pneumonia, unable to rule out Gram-negative organism  Has underlying lung cancer s/p chemoradiation treatment, last treatment 4/2024 per patient. Cultures negative to date  - Completed course of cefepime and doxycycline    Acute hypoxic respiratory failure  Continues to require supplemental O2 despite course of broad spectrum antibiotics. May be related to pneumonitis. Qualified for home O2. Today he is with increase in dyspnea and increase in O2 requirement. Unclear if related to trial of 1.5% of HTS yesterday. Obtained CTA chest. No main, lobar or proximal segmental pulmonary embolus. The distal segmental/subsegmental branches of the pulmonary arteries were not evaluated secondary to motion. In comparison with 5/28/2024, stable left perihilar nodular opacity. Stable diffuse bilateral ground-glass opacities. New small left pleural effusion.   - Ordered Lasix 40mg IVP x 1  - Continue O2 supplementation  - Prednisone taper, albuterol, Symbicort, will discuss with Pulmonary Medicine whether steroids need to be escalated if worsened hypoxia persists after Lasix IVP    Elevated troponin  Likely myocardial demand ischemia without infarction. 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 Nephrology evaluation noted, NaCl tabs started BID, still  Na not improved. Likely worsening by poor osmolar intake. Did not improve with Trial of 1.5% of HTS.   - Demeclocycline today as per Nephrology  - Increase po intake   - Fluid restriction, limit free water intake  - Repeat AM labs    Prediabetes with hyperglycemia due to steroids  A1c 6.4%  - Monitor glucose   - Continue insulin Lantus, correctional scale, titrate accordingly.     Severe protein calorie malnutrition  Appreciate dietician input  - Add thiamine and MVI daily  - Ensure Plus High Protein TID       Commode  3:1 commode;  pt will require a 3:1 commode as pt incapable of ambulating to bathroom as patient is confined to a single room without a bathroom.    Transport Wheelchair  This patient has a mobility limitation that significantly impairs the patients ability to participate in one or more MRADL's such as: toileting, eating, dressing and bathing in customary locations in the home. Patient's home provides adequate access between rooms for the use of the manual wheelchair. The wheelchair will significantly improve patient's ability to participate in MRADL's and will be used on a regular basis in the home. The patient's mobility limitation cannot be resolved by the use of a walker or cane. This patient does not have the upper extremity function to safely propel the manual wheelchair. Patient has a 24/7 care giver in the home who is willing to propel the wheelchair at any given time. The patient has agreed to use the wheelchair on a daily basis in the home. Diagnosis: Lung Cancer, Pneumonitis, weakness, FTT, COPD           52 minutes spent on total encounter. The necessity of the time spent during the encounter on this date of service was due to reviewing chart notes and data, seeing and evaluating patient, coordinating care including discussing case with consultants from Pulmonary Medicine, Nephrology, formulating and executing care plan with placement of new orders, and documenting today's visit, findings and plan.         79 man with HLD, hx of lung cancer s/p chemoradiation, last dose of both as per patient was in April 2024, p/w dyspnea, cough, fever, chills for 2-3 days, progressively worsened. Cough was productive of whitish yellow sputum. In the ED Tm 100.4. HR 100s. RR up to 28. O2 85% on room air. WBC 9.49. Hgb 11.1. Na 134 otherwise CMP grossly unremarkable. Lactate 1.9, ProBNP 657, Troponin 116.63->149.07. CTA with no pulmonary embolism through the level of the segmental pulmonary arteries. Evaluation of some subsegmental pulmonary arteries was limited. Left lower lobe nodular opacity likely known lung cancer. Left parahilar radiation changes. Nonspecific diffuse groundglass throughout the right lung, differential for which included pneumonitis or infection. Admitted to Medicine.    Acute hypoxic respiratory failure  Diagnosed with pneumonia upon admission. Has some subjective improvement with course of broad spectrum antibiotics. Continues to require supplemental O2, however. Appreciate Pulmonary Medicine input, may be related to pneumonitis. Treated with systemic steroids. Qualified for home O2 and was approaching stability for DC home with home O2 pending improvement in hyponatremia (see below). Today, he is with increase in dyspnea and increase in O2 requirement. Unclear if related to trial of 1.5% of HTS yesterday. Obtained CTA chest. No main, lobar or proximal segmental pulmonary embolus. The distal segmental/subsegmental branches of the pulmonary arteries were not evaluated secondary to motion. In comparison with 5/28/2024, stable left perihilar nodular opacity. Stable diffuse bilateral ground-glass opacities. New small left pleural effusion.   - Ordered Lasix 40mg IVP x 1  - Increased prednisone back up to methylprednisolone 40mg q12, continued Symbicort and albuterol  - Ordered for NIPPV with BiPAP for increased work of breathing  - ICU Team consulted    Severe sepsis due to pneumonia, unable to rule out Gram-negative organism  Has underlying lung cancer s/p chemoradiation treatment, last treatment 4/2024 per patient. Cultures negative to date. Completed course of cefepime and doxycycline.  - Continue to monitor    Elevated troponin  Likely myocardial demand ischemia without infarction. 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 Nephrology evaluation noted, NaCl tabs started BID, still  Na not improved. Likely worsening by poor osmolar intake. Did not improve with Trial of 1.5% of HTS.   - Demeclocycline today as per Nephrology  - Increase po intake   - Fluid restriction, limit free water intake  - Repeat AM labs    Prediabetes with hyperglycemia due to steroids  A1c 6.4%  - Monitor glucose   - Continue insulin Lantus, correctional scale, titrate accordingly.     Severe protein calorie malnutrition  Appreciate dietician input  - Add thiamine and MVI daily  - Ensure Plus High Protein TID       Commode  3:1 commode;  pt will require a 3:1 commode as pt incapable of ambulating to bathroom as patient is confined to a single room without a bathroom.    Transport Wheelchair  This patient has a mobility limitation that significantly impairs the patients ability to participate in one or more MRADL's such as: toileting, eating, dressing and bathing in customary locations in the home. Patient's home provides adequate access between rooms for the use of the manual wheelchair. The wheelchair will significantly improve patient's ability to participate in MRADL's and will be used on a regular basis in the home. The patient's mobility limitation cannot be resolved by the use of a walker or cane. This patient does not have the upper extremity function to safely propel the manual wheelchair. Patient has a 24/7 care giver in the home who is willing to propel the wheelchair at any given time. The patient has agreed to use the wheelchair on a daily basis in the home. Diagnosis: Lung Cancer, Pneumonitis, weakness, FTT, COPD           52 minutes spent on total encounter. The necessity of the time spent during the encounter on this date of service was due to reviewing chart notes and data, seeing and evaluating patient, coordinating care including discussing case with consultants from Pulmonary Medicine, Nephrology, formulating and executing care plan with placement of new orders, and documenting today's visit, findings and plan.         79 man with HLD, hx of lung cancer s/p chemoradiation, last dose of both as per patient was in April 2024, p/w dyspnea, cough, fever, chills for 2-3 days, progressively worsened. Cough was productive of whitish yellow sputum. In the ED Tm 100.4. HR 100s. RR up to 28. O2 85% on room air. WBC 9.49. Hgb 11.1. Na 134 otherwise CMP grossly unremarkable. Lactate 1.9, ProBNP 657, Troponin 116.63->149.07. CTA with no pulmonary embolism through the level of the segmental pulmonary arteries. Evaluation of some subsegmental pulmonary arteries was limited. Left lower lobe nodular opacity likely known lung cancer. Left parahilar radiation changes. Nonspecific diffuse groundglass throughout the right lung, differential for which included pneumonitis or infection. Admitted to Medicine.    Acute hypoxic respiratory failure  Diagnosed with pneumonia upon admission. Has some subjective improvement with course of broad spectrum antibiotics. Continues to require supplemental O2, however. Appreciate Pulmonary Medicine input, may be related to pneumonitis. Treated with systemic steroids. Qualified for home O2 and was approaching stability for DC home with home O2 pending improvement in hyponatremia (see below). Today, he is with increase in dyspnea and increase in O2 requirement. Unclear if related to trial of 1.5% of HTS yesterday. Obtained CTA chest. No main, lobar or proximal segmental pulmonary embolus. The distal segmental/subsegmental branches of the pulmonary arteries were not evaluated secondary to motion. In comparison with 5/28/2024, stable left perihilar nodular opacity. Stable diffuse bilateral ground-glass opacities. New small left pleural effusion.   - Ordered Lasix 40mg IVP x 1  - Increased prednisone back up to methylprednisolone 40mg q12, continued Symbicort and albuterol  - Ordered ABG  - Ordered for NIPPV with BiPAP for increased work of breathing  - ICU Team consulted    Severe sepsis due to pneumonia, unable to rule out Gram-negative organism  Has underlying lung cancer s/p chemoradiation treatment, last treatment 4/2024 per patient. Cultures negative to date. Completed course of cefepime and doxycycline.  - Continue to monitor    Elevated troponin  Likely myocardial demand ischemia without infarction. 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 Nephrology evaluation noted, NaCl tabs started BID, still  Na not improved. Likely worsening by poor osmolar intake. Did not improve with Trial of 1.5% of HTS.   - Demeclocycline today as per Nephrology  - Increase po intake   - Fluid restriction, limit free water intake  - Repeat AM labs    Prediabetes with hyperglycemia due to steroids  A1c 6.4%  - Monitor glucose   - Continue insulin Lantus, correctional scale, titrate accordingly.     Severe protein calorie malnutrition  Appreciate dietician input  - Add thiamine and MVI daily  - Ensure Plus High Protein TID       Commode  3:1 commode;  pt will require a 3:1 commode as pt incapable of ambulating to bathroom as patient is confined to a single room without a bathroom.    Transport Wheelchair  This patient has a mobility limitation that significantly impairs the patients ability to participate in one or more MRADL's such as: toileting, eating, dressing and bathing in customary locations in the home. Patient's home provides adequate access between rooms for the use of the manual wheelchair. The wheelchair will significantly improve patient's ability to participate in MRADL's and will be used on a regular basis in the home. The patient's mobility limitation cannot be resolved by the use of a walker or cane. This patient does not have the upper extremity function to safely propel the manual wheelchair. Patient has a 24/7 care giver in the home who is willing to propel the wheelchair at any given time. The patient has agreed to use the wheelchair on a daily basis in the home. Diagnosis: Lung Cancer, Pneumonitis, weakness, FTT, COPD           52 minutes spent on total encounter. The necessity of the time spent during the encounter on this date of service was due to reviewing chart notes and data, seeing and evaluating patient, coordinating care including discussing case with consultants from Pulmonary Medicine, Nephrology, formulating and executing care plan with placement of new orders, and documenting today's visit, findings and plan.

## 2024-06-11 NOTE — PROGRESS NOTE ADULT - SUBJECTIVE AND OBJECTIVE BOX
NEPHROLOGY INTERVAL HPI/OVERNIGHT EVENTS:  06-11-24 @ 15:31    6/11 family at bedside, doing well  6/10--Hypoxic this morning.  CTA done --negative for PE.  diffuse bilateral ground glass opacities and new small left pleural effusion.  Serum sodium remains low without significant response to 1.5 % NS yesterday.  Daughter at bedside reports pt eats fairly ok.      HPI:  79M with PMH of Lung Ca s/p chemoradiation (last dose of both as per patient in April 2024) and HLD presents with SOB/Cough/Fevers and chills for 2-3 days. onset of cough and MCDONALD 2-3 days prior to admission and since then it has been progressively worsening. Started to have chills 1-2 days prior to admission and these symptoms remains persistent and getting worse. Denies syncope, chest pain, nausea, vomiting, abdominal pain/discomfort. Diarrhea reports (1 episode daily) and non-bloody. Cough productive with yellow and white sputum production. Denies having diagnosis of COPD. Former tobacco use quit many years ago.     In the ER Tmax 100.4F, -108, BP 14/83, RR 16-28, SpO2 85% on RA. WBC 9.49, Hgb 11.1, Na 134 otherwise CMP grossly unremarkable. Lactate 1.9, ProBNP 657, Troponin 116.63->149.07.     Nephrology  Pt w above hx as per chart  Renal consult requested for hyponatremia  Pt has CT/ CXR finding c/w GGO, pneumonitis/ infection  Was on diuretics now dc d due developing hyponatremia  Since dc d yesterday, Na improved 122 to 125  d/e Dr Perera        MEDICATIONS  (STANDING):  albuterol    0.083% 2.5 milliGRAM(s) Nebulizer every 6 hours  albuterol    90 MICROgram(s) HFA Inhaler 1 Puff(s) Inhalation every 4 hours  aspirin enteric coated 81 milliGRAM(s) Oral daily  atorvastatin 40 milliGRAM(s) Oral at bedtime  budesonide 160 MICROgram(s)/formoterol 4.5 MICROgram(s) Inhaler 2 Puff(s) Inhalation two times a day  clotrimazole Lozenge 1 Lozenge Oral <User Schedule>  demeclocycline 300 milliGRAM(s) Oral two times a day  dextrose 10% Bolus 125 milliLiter(s) IV Bolus once  dextrose 5%. 1000 milliLiter(s) (100 mL/Hr) IV Continuous <Continuous>  dextrose 5%. 1000 milliLiter(s) (50 mL/Hr) IV Continuous <Continuous>  dextrose 50% Injectable 25 Gram(s) IV Push once  dextrose 50% Injectable 12.5 Gram(s) IV Push once  enoxaparin Injectable 40 milliGRAM(s) SubCutaneous every 24 hours  glucagon  Injectable 1 milliGRAM(s) IntraMuscular once  insulin glargine Injectable (LANTUS) 5 Unit(s) SubCutaneous every morning  insulin lispro (ADMELOG) corrective regimen sliding scale   SubCutaneous three times a day before meals  insulin lispro (ADMELOG) corrective regimen sliding scale   SubCutaneous at bedtime  insulin lispro Injectable (ADMELOG) 2 Unit(s) SubCutaneous three times a day before meals  lisinopril 10 milliGRAM(s) Oral daily  methylPREDNISolone sodium succinate Injectable 40 milliGRAM(s) IV Push every 12 hours  metoprolol succinate ER 25 milliGRAM(s) Oral daily  multivitamin/minerals 1 Tablet(s) Oral daily  polyethylene glycol 3350 17 Gram(s) Oral daily  senna 2 Tablet(s) Oral at bedtime  sodium chloride 1 Gram(s) Oral three times a day  sodium chloride 1.5%. 500 milliLiter(s) (50 mL/Hr) IV Continuous <Continuous>  thiamine 100 milliGRAM(s) Oral daily  tiotropium 2.5 MICROgram(s) Inhaler 2 Puff(s) Inhalation daily    MEDICATIONS  (PRN):  acetaminophen     Tablet .. 650 milliGRAM(s) Oral every 6 hours PRN Temp greater or equal to 38C (100.4F), Mild Pain (1 - 3)  aluminum hydroxide/magnesium hydroxide/simethicone Suspension 30 milliLiter(s) Oral every 4 hours PRN Dyspepsia  benzonatate 100 milliGRAM(s) Oral every 8 hours PRN Cough  dextrose Oral Gel 15 Gram(s) Oral once PRN Blood Glucose LESS THAN 70 milliGRAM(s)/deciliter  guaiFENesin Oral Liquid (Sugar-Free) 100 milliGRAM(s) Oral every 6 hours PRN Cough  melatonin 3 milliGRAM(s) Oral at bedtime PRN Insomnia  ondansetron Injectable 4 milliGRAM(s) IV Push every 8 hours PRN Nausea and/or Vomiting      Allergies    No Known Allergies    Intolerances        I&O's Detail    10 Shaka 2024 07:01  -  11 Jun 2024 07:00  --------------------------------------------------------  IN:    Oral Fluid: 400 mL  Total IN: 400 mL    OUT:    Voided (mL): 300 mL  Total OUT: 300 mL    Total NET: 100 mL      11 Jun 2024 07:01  -  11 Jun 2024 15:31  --------------------------------------------------------  IN:    Oral Fluid: 250 mL  Total IN: 250 mL    OUT:    Voided (mL): 150 mL  Total OUT: 150 mL    Total NET: 100 mL          Vital Signs Last 24 Hrs  T(C): 36.3 (11 Jun 2024 07:53), Max: 36.4 (10 Shaka 2024 20:41)  T(F): 97.4 (11 Jun 2024 07:53), Max: 97.5 (10 Shaka 2024 20:41)  HR: 101 (11 Jun 2024 09:40) (75 - 104)  BP: 126/66 (11 Jun 2024 07:53) (106/74 - 126/66)  BP(mean): 81 (11 Jun 2024 07:53) (81 - 82)  RR: 18 (11 Jun 2024 07:53) (18 - 19)  SpO2: 94% (11 Jun 2024 09:40) (94% - 94%)    Parameters below as of 11 Jun 2024 09:40  Patient On (Oxygen Delivery Method): mask, Venturi      Daily     Daily     PHYSICAL EXAM:  General: alert. awake NAD  HEENT: MMM  CV: s1s2 rrr  LUNGS: B/L CTA  EXT: no edema    LABS:                        11.2   16.71 )-----------( 186      ( 11 Jun 2024 07:50 )             31.8     06-11    126<L>  |  91<L>  |  24<H>  ----------------------------<  145<H>  4.2   |  27  |  0.44<L>    Ca    8.8      11 Jun 2024 07:50        Urinalysis Basic - ( 11 Jun 2024 07:50 )    Color: x / Appearance: x / SG: x / pH: x  Gluc: 145 mg/dL / Ketone: x  / Bili: x / Urobili: x   Blood: x / Protein: x / Nitrite: x   Leuk Esterase: x / RBC: x / WBC x   Sq Epi: x / Non Sq Epi: x / Bacteria: x        ABG - ( 10 Shaka 2024 18:26 )  pH, Arterial: 7.47  pH, Blood: x     /  pCO2: 35    /  pO2: 68    / HCO3: 26    / Base Excess: 2.1   /  SaO2: 95

## 2024-06-11 NOTE — PROGRESS NOTE ADULT - SUBJECTIVE AND OBJECTIVE BOX
79M with PMH of Lung Ca s/p chemoradiation (last dose of both as per patient in April 2024) and HLD presents with SOB/Cough/Fevers and chills for 2-3 days. onset of cough and MCDONALD 2-3 days prior to admission and since then it has been progressively worsening. Started to have chills 1-2 days prior to admission and these symptoms remains persistent and getting worse. Denies syncope, chest pain, nausea, vomiting, abdominal pain/discomfort. Diarrhea reports (1 episode daily) and non-bloody. Cough productive with yellow and white sputum production. Denies having diagnosis of COPD. Former tobacco use quit many years ago. Primary Romanian speaking. ID below  5/30/24 Seen and examined at bedside. Seated in chair with no C/O pain or dyspnea at rest. Endorses dyspnea on exertion.  ID [id number].      6/11  pt seen and examined  on venti mask  has been admitted 3 weeks with no significant improvement today    Family and i had discussion with him at bedside re: GOC AD and Hospice.        We discussed Palliative Care team being a supportive team when a patient has ongoing illnesses.  We also discussed that it is not an end of life care service, but can help navigate symptoms and emotional support throughout their hospital stay here.     Hospice was explained as well as an end of life care philosophy. When a disease cannot be cured, or family/patient decide the treatment burdens out weight the risk and chose to change focus of treatment from cure to quality/comfort.       We discussed at length patients underlying clinical diagnosis at length.  We discussed resuscitation and risk and benefits of CPR. Risks include, broken ribs, lung puncture, pain and discomfort.   At this time due to patients underlying irreversible diagnosis of pneumonitis  I would not recommend CPR because it would not reverse current medical condition.  They understand that DNR means NO CPR and that would allow natural death.  No CPR means no attempt to restart the heart once it has stopped.  Patient verbalized understanding.     We also discussed mechanical ventilation in  an event that they could no longer breath on their own.  Risk and benefits of mechanical ventilation were discussed at length.  At this time, due to patients irreversible medical condition of pneumonitis   it is of concern that if patient were to be intubated, extubation may not be possible.  Also intubation would not reverse current medical condition which if progresses its natural course would lead to the patients death.  Patient was able to verbalized understanding.         PAIN: ( )Yes   (X )No    DYSPNEA: (X ) Yes  ( ) No  On exertion       Review of Systems:  Physical Discomfort-mild  Dyspnea-on exertion  Anorexia-mild  Fatigue-mod-severe  Weakness-mod    All other systems reviewed and negative      PHYSICAL EXAM:  ICU Vital Signs Last 24 Hrs  T(C): 36.3 (11 Jun 2024 07:53), Max: 36.7 (10 Shaka 2024 15:20)  T(F): 97.4 (11 Jun 2024 07:53), Max: 98.1 (10 Shaka 2024 15:20)  HR: 101 (11 Jun 2024 09:40) (75 - 107)  BP: 126/66 (11 Jun 2024 07:53) (106/74 - 126/66)  BP(mean): 81 (11 Jun 2024 07:53) (81 - 86)  ABP: --  ABP(mean): --  RR: 18 (11 Jun 2024 07:53) (18 - 22)  SpO2: 94% (11 Jun 2024 09:40) (93% - 95%)    O2 Parameters below as of 11 Jun 2024 09:40  Patient On (Oxygen Delivery Method): mask, Venturi       PPSV2: 30  %  General: Elderly male in chair in NAD  Mental Status: A&O X3  HEENT: oral mucosa moist/nasal o2  Lungs: clear ollie to auscultation  Cardiac: S1S2+  GI: abd soft Nt ND + BS  : voids  Ext: BOCANEGRA=strength  Neuro: no focal def      LABS:                        11.2   16.71 )-----------( 186      ( 11 Jun 2024 07:50 )             31.8     06-11    126<L>  |  91<L>  |  24<H>  ----------------------------<  145<H>  4.2   |  27  |  0.44<L>    Ca    8.8      11 Jun 2024 07:50          Albumin: Albumin: 2.6 g/dL (05-28 @ 07:24)      Allergies    No Known Allergies    Intolerances      MEDICATIONS  (STANDING):  albuterol    0.083% 2.5 milliGRAM(s) Nebulizer every 6 hours  albuterol    90 MICROgram(s) HFA Inhaler 1 Puff(s) Inhalation every 4 hours  aspirin enteric coated 81 milliGRAM(s) Oral daily  atorvastatin 40 milliGRAM(s) Oral at bedtime  budesonide 160 MICROgram(s)/formoterol 4.5 MICROgram(s) Inhaler 2 Puff(s) Inhalation two times a day  clotrimazole Lozenge 1 Lozenge Oral <User Schedule>  dextrose 10% Bolus 125 milliLiter(s) IV Bolus once  dextrose 5%. 1000 milliLiter(s) (50 mL/Hr) IV Continuous <Continuous>  dextrose 5%. 1000 milliLiter(s) (100 mL/Hr) IV Continuous <Continuous>  dextrose 50% Injectable 25 Gram(s) IV Push once  dextrose 50% Injectable 12.5 Gram(s) IV Push once  enoxaparin Injectable 40 milliGRAM(s) SubCutaneous every 24 hours  glucagon  Injectable 1 milliGRAM(s) IntraMuscular once  insulin glargine Injectable (LANTUS) 5 Unit(s) SubCutaneous every morning  insulin lispro (ADMELOG) corrective regimen sliding scale   SubCutaneous three times a day before meals  insulin lispro (ADMELOG) corrective regimen sliding scale   SubCutaneous at bedtime  insulin lispro Injectable (ADMELOG) 2 Unit(s) SubCutaneous three times a day before meals  lisinopril 10 milliGRAM(s) Oral daily  methylPREDNISolone sodium succinate Injectable 40 milliGRAM(s) IV Push every 6 hours  metoprolol succinate ER 25 milliGRAM(s) Oral daily  tiotropium 2.5 MICROgram(s) Inhaler 2 Puff(s) Inhalation daily    MEDICATIONS  (PRN):  acetaminophen     Tablet .. 650 milliGRAM(s) Oral every 6 hours PRN Temp greater or equal to 38C (100.4F), Mild Pain (1 - 3)  aluminum hydroxide/magnesium hydroxide/simethicone Suspension 30 milliLiter(s) Oral every 4 hours PRN Dyspepsia  benzonatate 100 milliGRAM(s) Oral every 8 hours PRN Cough  dextrose Oral Gel 15 Gram(s) Oral once PRN Blood Glucose LESS THAN 70 milliGRAM(s)/deciliter  guaiFENesin Oral Liquid (Sugar-Free) 100 milliGRAM(s) Oral every 6 hours PRN Cough  melatonin 3 milliGRAM(s) Oral at bedtime PRN Insomnia  ondansetron Injectable 4 milliGRAM(s) IV Push every 8 hours PRN Nausea and/or Vomiting      RADIOLOGY/ADDITIONAL STUDIES:    < from: CT Chest No Cont (05.28.24 @ 15:45) >    ACC: [id number] EXAM:  CT CHEST   ORDERED BY: ASTER GEIGER     PROCEDURE DATE:  05/28/2024          INTERPRETATION:  CLINICAL INFORMATION: Hypoxia. Reevaluation of   pneumonia/pneumonitis    COMPARISON: CTA chest 5/23/2024    CONTRAST/COMPLICATIONS:  IV Contrast: NONE  Oral Contrast: NONE  Complications: None reported at time of study completion    PROCEDURE:  CT of the Chest was performed.  Sagittal and coronal reformats were performed.    FINDINGS:    LUNGS AND AIRWAYS: Patent central airways.  Emphysema. Ill-defined   previously described 2.6 cm nodular opacity in the left lower lobe is   unchanged may correspond to the patient's given history of lung cancer.   Groundglass opacities in the central portions of the left upper and left   lowerlobes are unchanged from 5/23/2024. No significant change in   diffuse right greater than left groundglass opacities with a mid to lower   lung predominance. Few small nodules, for reference a 6 mm nodule in the   left upper lobe (2, 56) and a 5 mm nodule in the right upper lobe (2, 70)   are unchanged. Multiple chronic cysts in the dependent portions of the   lower lobes, larger on the right.  PLEURA: Decrease in size of now trace left pleural effusion.  MEDIASTINUM AND EFREN: Unchanged mildly prominent mediastinal   lymphadenopathy. For reference, 1.3 cm subcarinal lymph node previously   measured 1.3 cm.  VESSELS: Coronary artery and aortic calcifications.Mild enlargement of   the aortic root at the sinus of Valsalva measuring up to 4.2 cm as   correlated with prior contrast enhanced CT. The ascending aorta measures   up to 3.0 cm, and the descending thoracic aorta measuring up to 2.7 cm   when correlated with prior contrast enhanced CT.  HEART: Cardiomegaly. Trace pericardial fluid.  CHEST WALL AND LOWER NECK: Within normal limits.  VISUALIZED UPPER ABDOMEN: Partially imaged left renal cyst.  BONES: Degenerative changes.    IMPRESSION:    Unchanged right greater than left groundglass opacities, which are   nonspecific, at least some of which may be related to radiation therapy.   Differential diagnostic considerations also but is not limited to include   superimposed inflammatory etiologies.    Unchanged nodular opacities including a dominant 2.6 cm left lower lobe   nodular opacity which may correspond to the patient's history of lung   cancer.    Comparison with prior older chest CTs are recommended for complete   evaluation of the above findings and if images are made available, an   addendum can be issued.    Minimal left pleural fluid with interval decrease in size since May 23,   2024.    < from: Xray Chest 2 Views PA/Lat (05.27.24 @ 09:31) >    ACC: [id number] EXAM:  XR CHEST PA LAT 2V   ORDERED BY: ASTER GEIGER     PROCEDURE DATE:  05/27/2024          INTERPRETATION:  PA and lateral chest radiographs    COMPARISON: CT chest 5/23/2004 and chest x-ray 5/23/2004.  CLINICAL INFORMATION: Hypoxicrespiratory failure.. Follow-up    FINDINGS:  CATHETERS AND TUBES: None    PULMONARY: Pulmonary vascular congestion with bilateral perihilar/LEFT   lower zone interstitial/alveolar airspace disease. No pleural effusion or   pneumothorax.    HEART/VASCULAR: The  heart is enlarged in transverse diameter. .    BONES: The visualized osseous thorax is intact.    IMPRESSION:  No significant change.  Pulmonary vascular congestion with bilateral perihilar/LEFT lower zone   interstitial/alveolar airspace disease    < from: CT Angio Chest PE Protocol w/ IV Cont (05.23.24 @ 11:34) >    ACC: [id number] EXAM:  CT ANGIO CHEST PULM ART WAWIC   ORDERED BY: LINDA ARGUETA     PROCEDURE DATE:  05/23/2024          INTERPRETATION:  INDICATION: Shortness of breath. 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 Completed multiple   cycles of chemotherapy, currently on keytruda and is currently undergoing   radiotherapy to the left lung.    TECHNIQUE: Helical acquisition of the chest after the administration of   70 mL of Omnipaque 350. Maximum intensity projection images were   generated.    COMPARISON: None.    FINDINGS:    HEART/VASCULATURE: No pulmonary embolism to the level of the segmental   arteries bilaterally. Evaluation of some subsegmental arteries is limited   due respiratory motion. Enlarged right ventricle relative to the left   ventricle. No pericardial effusion.    LUNGS/AIRWAYS/PLEURA: Central airways are patent. Emphysema. Well   marginated opacities in the central portion of the left upper and lower   lobes. Central left lower lobe 2.6cm nodular opacity (2-66). Diffuse   groundglass in the right upper, middle, and lower lobes. Few small lung   nodules, for example, 6 mm in the left upper lobe, (2:53), and 5 mm in   the right upper lobe (2:70). Left upper lobe calcified granuloma. Trace   left pleural effusion.      LYMPH NODES/MEDIASTINUM: 1.3 cm subcarinal lymph node. Few other   intrathoracic lymph nodes up to 1 cm. Subcentimeter left supraclavicular   lymph node.    UPPER ABDOMEN: 3.0 cm midpole left renal cyst. No acute intra-abdominal   findings of the partially visualized upper abdomen.    BONES/SOFT TISSUES: Degenerative changes. No aggressive osseous lesions.      IMPRESSION:    No pulmonary embolism through the level of the segmental pulmonary   arteries. Evaluation of some subsegmental pulmonary arteries is limited.    Left lower lobe nodular opacity likely known lung cancer. Left parahilar   radiation changes.    Nonspecific diffuse groundglass throughout the right lung, differential   for which includes pneumonitis or infection.      < end of copied text >

## 2024-06-11 NOTE — PROGRESS NOTE ADULT - SUBJECTIVE AND OBJECTIVE BOX
Chief Complaint: Fever, dyspnea, cough    Interval Hx: Patient seen and examined. Patient unchanged compared to yesterday. Relative increase in dyspnea as compared to prior couple of days. No other new complaints. No new fever. No new cough. He had been improving and his symptoms were resolving and just dealing with hyponatremia. Now he has this increase in dyspnea and increase in O2 requirement. Obtained CTA chest. No main, lobar or proximal segmental pulmonary embolus. The distal segmental/subsegmental branches of the pulmonary arteries were not evaluated secondary to motion. In comparison with 5/28/2024, stable left perihilar nodular opacity. Stable diffuse bilateral ground-glass opacities. New small left pleural effusion. Received IV Lasix x 1 dose. Nephrology ordered demeclocycline. Patient and family met with Palliative Care Team today. Patient now DNR. DNI. Patient and family are considering transition to comfort-focused care plan, hospice.      ROS: Multi system review is comprehensively negative x 10 systems except as above    Vitals:  T(F): 97.4 (11 Jun 2024 07:53), Max: 97.5 (10 Shaka 2024 20:41)  HR: 101 (11 Jun 2024 09:40) (75 - 104)  BP: 126/66 (11 Jun 2024 07:53) (106/74 - 126/66)  RR: 18 (11 Jun 2024 07:53) (18 - 19)  SpO2: 94% (11 Jun 2024 09:40) (94% - 94%) 50% venti mast    Exam:  Gen: No acute distress  HEENT: PERRL, conjunctiva clear, EOM's intact, non injected pharynx, no exudates  Neck: Supple, no adenopathy, thyroid not enlarged, no carotid bruit or JVD  Back: No tenderness  Lungs: Normal resp effort at rest, bibasilar crackles persist, no wheeze  Heart: S1, S2 normal, RRR  Abdomen: Soft, non-tender, bowel sounds active  Extremities: No edema, tenderness, no synovitis  Skin: Normal skin color, no rashes   Neurologic: Awake and alert, answers simple questions, follows commands    Labs:             POCT glucose 200s               11.2   16.71 )--------( 186              31.8       126  |  91  |  24  ---------------------<  145  4.2   |  27  |  0.44    Ca  8.8    Imaging:  CTA chest PE protocol W/ IV cont 6/10: No main, lobar or proximal segmental pulmonary embolus. The distal segmental/subsegmental branches of the pulmonary arteries were not evaluated secondary to motion. In comparison with 5/28/2024, stable left perihilar nodular opacity. Stable diffuse bilateral ground-glass opacities. New small left pleural effusion.     CXR 6/7: Frontal expiratory view of the chest shows the heart to be similar in size. The lungs show slight clearing of interstitial markings in the right lung and progression of interstitial markings in the left lung. There is no evidence of pneumothorax nor definite pleural effusion.    CXR 6/4: Heart magnified by technique. On May 27 there are perihilar infiltrates. On present examination left-sided infiltrates are stable. There are increasing right upper perihilar infiltrates.    CT chest WO 5/28: As compared to CT chest from 5/23, unchanged right greater than left groundglass opacities, which are nonspecific, at least some of which may be related to radiation therapy.   Differential diagnostic considerations also but is not limited to include superimposed inflammatory etiologies. Unchanged nodular opacities including a dominant 2.6 cm left lower lobe   nodular opacity which may correspond to the patient's history of lung cancer. Minimal left pleural fluid with interval decrease in size since May 23, 2024.    CTA chest W/ IV cont 5/23: No pulmonary embolism through the level of the segmental pulmonary arteries. Evaluation of some subsegmental pulmonary arteries is limited. Left lower lobe nodular opacity likely known lung cancer. Left parahilar radiation changes. Nonspecific diffuse groundglass throughout the right lung, differential for which includes pneumonitis or infection.    Cardiac Testing:  TTE 5/24: Left ventricular cavity is normal in size. Left ventricular systolic function is normal with an ejection fraction of 52 % by 3D with an ejection fraction visually estimated at 50 to 55 %. Normal right ventricular cavity size, with normal wall thickness, and normal systolic function. Mild mitral regurgitation. Mild aortic regurgitation.    EKG 5/23: Normal sinus rhythm. Normal ECG    Meds:  MEDICATIONS  (STANDING):  albuterol    0.083% 2.5 milliGRAM(s) Nebulizer every 6 hours  albuterol    90 MICROgram(s) HFA Inhaler 1 Puff(s) Inhalation every 4 hours  aspirin enteric coated 81 milliGRAM(s) Oral daily  atorvastatin 40 milliGRAM(s) Oral at bedtime  budesonide 160 MICROgram(s)/formoterol 4.5 MICROgram(s) Inhaler 2 Puff(s) Inhalation two times a day  clotrimazole Lozenge 1 Lozenge Oral <User Schedule>  demeclocycline 300 milliGRAM(s) Oral two times a day  enoxaparin Injectable 40 milliGRAM(s) SubCutaneous every 24 hours  glucagon  Injectable 1 milliGRAM(s) IntraMuscular once  insulin glargine Injectable (LANTUS) 8 Unit(s) SubCutaneous every morning  insulin lispro (ADMELOG) corrective regimen sliding scale   SubCutaneous three times a day before meals  insulin lispro (ADMELOG) corrective regimen sliding scale   SubCutaneous at bedtime  insulin lispro Injectable (ADMELOG) 3 Unit(s) SubCutaneous three times a day before meals  lisinopril 10 milliGRAM(s) Oral daily  methylPREDNISolone sodium succinate Injectable 40 milliGRAM(s) IV Push every 12 hours  metoprolol succinate ER 25 milliGRAM(s) Oral daily  multivitamin/minerals 1 Tablet(s) Oral daily  polyethylene glycol 3350 17 Gram(s) Oral daily  senna 2 Tablet(s) Oral at bedtime  sodium chloride 1 Gram(s) Oral three times a day  thiamine 100 milliGRAM(s) Oral daily  tiotropium 2.5 MICROgram(s) Inhaler 2 Puff(s) Inhalation daily    MEDICATIONS  (PRN):  acetaminophen     Tablet .. 650 milliGRAM(s) Oral every 6 hours PRN Temp greater or equal to 38C (100.4F), Mild Pain (1 - 3)  aluminum hydroxide/magnesium hydroxide/simethicone Suspension 30 milliLiter(s) Oral every 4 hours PRN Dyspepsia  benzonatate 100 milliGRAM(s) Oral every 8 hours PRN Cough  dextrose Oral Gel 15 Gram(s) Oral once PRN Blood Glucose LESS THAN 70 milliGRAM(s)/deciliter  guaiFENesin Oral Liquid (Sugar-Free) 100 milliGRAM(s) Oral every 6 hours PRN Cough  melatonin 3 milliGRAM(s) Oral at bedtime PRN Insomnia  ondansetron Injectable 4 milliGRAM(s) IV Push every 8 hours PRN Nausea and/or Vomiting         Chief Complaint: Fever, dyspnea, cough    Interval Hx: Patient seen and examined. Patient unchanged compared to yesterday. Relative increase in dyspnea as compared to prior couple of days. No other new complaints. No new fever. No new cough. He had been improving and his symptoms were resolving and just dealing with hyponatremia. Now he has this increase in dyspnea and increase in O2 requirement. Obtained CTA chest. No main, lobar or proximal segmental pulmonary embolus. The distal segmental/subsegmental branches of the pulmonary arteries were not evaluated secondary to motion. In comparison with 5/28/2024, stable left perihilar nodular opacity. Stable diffuse bilateral ground-glass opacities. New small left pleural effusion. Received IV Lasix x 1 dose. Nephrology ordered demeclocycline. Patient and family met with Palliative Care Team today. Patient now DNR. DNI. Patient and family are considering transition to comfort-focused care plan, hospice.      ROS: Multi system review is comprehensively negative x 10 systems except as above    Vitals:  T(F): 97.4 (11 Jun 2024 07:53), Max: 97.5 (10 Shaka 2024 20:41)  HR: 101 (11 Jun 2024 09:40) (75 - 104)  BP: 126/66 (11 Jun 2024 07:53) (106/74 - 126/66)  RR: 18 (11 Jun 2024 07:53) (18 - 19)  SpO2: 94% (11 Jun 2024 09:40) (94% - 94%) 50% venti mast    Exam:  Gen: No acute distress  HEENT: PERRL, conjunctiva clear, EOM's intact, non injected pharynx, no exudates  Neck: Supple, no adenopathy, thyroid not enlarged, no carotid bruit or JVD  Back: No tenderness  Lungs: Normal resp effort at rest, bibasilar crackles persist, no wheeze  Heart: S1, S2 normal, RRR  Abdomen: Soft, non-tender, bowel sounds active  Extremities: No edema, tenderness, no synovitis  Skin: Normal skin color, no rashes   Neurologic: Awake and alert, answers simple questions, follows commands    Labs:             POCT glucose 200s               11.2   16.71 )--------( 186              31.8       126  |  91  |  24  ---------------------<  145  4.2   |  27  |  0.44    Ca  8.8    Imaging:  CTA chest PE protocol W/ IV cont 6/10: No main, lobar or proximal segmental pulmonary embolus. The distal segmental/subsegmental branches of the pulmonary arteries were not evaluated secondary to motion. In comparison with 5/28/2024, stable left perihilar nodular opacity. Stable diffuse bilateral ground-glass opacities. New small left pleural effusion.     CXR 6/7: Frontal expiratory view of the chest shows the heart to be similar in size. The lungs show slight clearing of interstitial markings in the right lung and progression of interstitial markings in the left lung. There is no evidence of pneumothorax nor definite pleural effusion.    CXR 6/4: Heart magnified by technique. On May 27 there are perihilar infiltrates. On present examination left-sided infiltrates are stable. There are increasing right upper perihilar infiltrates.    CT chest WO 5/28: As compared to CT chest from 5/23, unchanged right greater than left groundglass opacities, which are nonspecific, at least some of which may be related to radiation therapy. Differential diagnostic considerations also but is not limited to include superimposed inflammatory etiologies. Unchanged nodular opacities including a dominant 2.6 cm left lower lobe nodular opacity which may correspond to the patient's history of lung cancer. Minimal left pleural fluid with interval decrease in size since May 23, 2024.    CTA chest W/ IV cont 5/23: No pulmonary embolism through the level of the segmental pulmonary arteries. Evaluation of some subsegmental pulmonary arteries is limited. Left lower lobe nodular opacity likely known lung cancer. Left parahilar radiation changes. Nonspecific diffuse groundglass throughout the right lung, differential for which includes pneumonitis or infection.    Cardiac Testing:  TTE 5/24: Left ventricular cavity is normal in size. Left ventricular systolic function is normal with an ejection fraction of 52 % by 3D with an ejection fraction visually estimated at 50 to 55 %. Normal right ventricular cavity size, with normal wall thickness, and normal systolic function. Mild mitral regurgitation. Mild aortic regurgitation.    EKG 5/23: Normal sinus rhythm. Normal ECG    Meds:  MEDICATIONS  (STANDING):  albuterol    0.083% 2.5 milliGRAM(s) Nebulizer every 6 hours  albuterol    90 MICROgram(s) HFA Inhaler 1 Puff(s) Inhalation every 4 hours  aspirin enteric coated 81 milliGRAM(s) Oral daily  atorvastatin 40 milliGRAM(s) Oral at bedtime  budesonide 160 MICROgram(s)/formoterol 4.5 MICROgram(s) Inhaler 2 Puff(s) Inhalation two times a day  clotrimazole Lozenge 1 Lozenge Oral <User Schedule>  demeclocycline 300 milliGRAM(s) Oral two times a day  enoxaparin Injectable 40 milliGRAM(s) SubCutaneous every 24 hours  glucagon  Injectable 1 milliGRAM(s) IntraMuscular once  insulin glargine Injectable (LANTUS) 8 Unit(s) SubCutaneous every morning  insulin lispro (ADMELOG) corrective regimen sliding scale   SubCutaneous three times a day before meals  insulin lispro (ADMELOG) corrective regimen sliding scale   SubCutaneous at bedtime  insulin lispro Injectable (ADMELOG) 3 Unit(s) SubCutaneous three times a day before meals  lisinopril 10 milliGRAM(s) Oral daily  methylPREDNISolone sodium succinate Injectable 40 milliGRAM(s) IV Push every 12 hours  metoprolol succinate ER 25 milliGRAM(s) Oral daily  multivitamin/minerals 1 Tablet(s) Oral daily  polyethylene glycol 3350 17 Gram(s) Oral daily  senna 2 Tablet(s) Oral at bedtime  sodium chloride 1 Gram(s) Oral three times a day  thiamine 100 milliGRAM(s) Oral daily  tiotropium 2.5 MICROgram(s) Inhaler 2 Puff(s) Inhalation daily    MEDICATIONS  (PRN):  acetaminophen     Tablet .. 650 milliGRAM(s) Oral every 6 hours PRN Temp greater or equal to 38C (100.4F), Mild Pain (1 - 3)  aluminum hydroxide/magnesium hydroxide/simethicone Suspension 30 milliLiter(s) Oral every 4 hours PRN Dyspepsia  benzonatate 100 milliGRAM(s) Oral every 8 hours PRN Cough  dextrose Oral Gel 15 Gram(s) Oral once PRN Blood Glucose LESS THAN 70 milliGRAM(s)/deciliter  guaiFENesin Oral Liquid (Sugar-Free) 100 milliGRAM(s) Oral every 6 hours PRN Cough  melatonin 3 milliGRAM(s) Oral at bedtime PRN Insomnia  ondansetron Injectable 4 milliGRAM(s) IV Push every 8 hours PRN Nausea and/or Vomiting

## 2024-06-11 NOTE — PROGRESS NOTE ADULT - SUBJECTIVE AND OBJECTIVE BOX
Patient is a 79y old  Male who presents with a chief complaint of Cough/SOB/Fevers (24 May 2024 22:41)      HPI:  79M with PMH of Lung Ca s/p chemoradiation (last dose of both as per patient in April 2024) and HLD presents with SOB/Cough/Fevers and chills for 2-3 days. onset of cough and MCDON[place]D 2-3 days prior to admission and since then it has been progressively worsening. Started to have chills 1-2 days prior to admission and these symptoms remains persistent and getting worse. Denies syncope, chest pain, nausea, vomiting, abdominal pain/discomfort. Diarrhea reports (1 episode daily) and non-bloody. Cough productive with yellow and white sputum production. Denies having diagnosis of COPD. Former tobacco use quit many years ago.   In the ER Tmax 100.4F, -108, BP 14/83, RR 16-28, SpO2 85% on RA. WBC 9.49, Hgb 11.1, Na 134 otherwise CMP grossly unremarkable. Lactate 1.9, ProBNP 657, Troponin 116.63->149.07.   CTA- No pulmonary embolism through the level of the segmental pulmonary   arteries. Evaluation of some subsegmental pulmonary arteries is limited. Left lower lobe nodular opacity likely known lung cancer. Left parahilar   radiation changes. Nonspecific diffuse groundglass throughout the right lung, differential   for which includes pneumonitis or infection.    6/11  data discussed with pt;s DTR and RN staff at bedside  resp status deterioration addressed by repeat CTA and ABG all reviewed  overall no significant improvement clinically or radiographically despite course of antibiotics and steroids  increased A -a gradient requiring 50% Ventimask  ICU eval noted and no overt aspiration noted  failed to respond and prognosis remains guarded  family is aware and course reviewed with hospitalist caring for him as well      MEDICATIONS  (STANDING):  albuterol    0.083% 2.5 milliGRAM(s) Nebulizer every 6 hours  albuterol    90 MICROgram(s) HFA Inhaler 1 Puff(s) Inhalation every 4 hours  aspirin enteric coated 81 milliGRAM(s) Oral daily  atorvastatin 40 milliGRAM(s) Oral at bedtime  budesonide 160 MICROgram(s)/formoterol 4.5 MICROgram(s) Inhaler 2 Puff(s) Inhalation two times a day  clotrimazole Lozenge 1 Lozenge Oral <User Schedule>  demeclocycline 300 milliGRAM(s) Oral two times a day  dextrose 10% Bolus 125 milliLiter(s) IV Bolus once  dextrose 5%. 1000 milliLiter(s) (50 mL/Hr) IV Continuous <Continuous>  dextrose 5%. 1000 milliLiter(s) (100 mL/Hr) IV Continuous <Continuous>  dextrose 50% Injectable 25 Gram(s) IV Push once  dextrose 50% Injectable 12.5 Gram(s) IV Push once  enoxaparin Injectable 40 milliGRAM(s) SubCutaneous every 24 hours  glucagon  Injectable 1 milliGRAM(s) IntraMuscular once  insulin glargine Injectable (LANTUS) 5 Unit(s) SubCutaneous every morning  insulin lispro (ADMELOG) corrective regimen sliding scale   SubCutaneous three times a day before meals  insulin lispro (ADMELOG) corrective regimen sliding scale   SubCutaneous at bedtime  insulin lispro Injectable (ADMELOG) 2 Unit(s) SubCutaneous three times a day before meals  lisinopril 10 milliGRAM(s) Oral daily  methylPREDNISolone sodium succinate Injectable 40 milliGRAM(s) IV Push every 12 hours  metoprolol succinate ER 25 milliGRAM(s) Oral daily  multivitamin/minerals 1 Tablet(s) Oral daily  polyethylene glycol 3350 17 Gram(s) Oral daily  senna 2 Tablet(s) Oral at bedtime  sodium chloride 1 Gram(s) Oral three times a day  sodium chloride 1.5%. 500 milliLiter(s) (50 mL/Hr) IV Continuous <Continuous>  thiamine 100 milliGRAM(s) Oral daily  tiotropium 2.5 MICROgram(s) Inhaler 2 Puff(s) Inhalation daily    predniSONE   Tablet 40 milliGRAM(s) Oral daily  sodium chloride 1 Gram(s) Oral two times a day  tiotropium 2.5 MICROgram(s) Inhaler 2 Puff(s) Inhalation daily    MEDICATIONS  (PRN):  acetaminophen     Tablet .. 650 milliGRAM(s) Oral every 6 hours PRN Temp greater or equal to 38C (100.4F), Mild Pain (1 - 3)  aluminum hydroxide/magnesium hydroxide/simethicone Suspension 30 milliLiter(s) Oral every 4 hours PRN Dyspepsia  benzonatate 100 milliGRAM(s) Oral every 8 hours PRN Cough  dextrose Oral Gel 15 Gram(s) Oral once PRN Blood Glucose LESS THAN 70 milliGRAM(s)/deciliter  guaiFENesin Oral Liquid (Sugar-Free) 100 milliGRAM(s) Oral every 6 hours PRN Cough  melatonin 3 milliGRAM(s) Oral at bedtime PRN Insomnia  ondansetron Injectable 4 milliGRAM(s) IV Push every 8 hours PRN Nausea and/or Vomiting        Vital Signs Last 24 Hrs  T(C): 36.4 (10 Shaka 2024 20:41), Max: 36.7 (10 Shaka 2024 08:17)  T(F): 97.5 (10 Shaka 2024 20:41), Max: 98.1 (10 Shaka 2024 15:20)  HR: 104 (11 Jun 2024 01:59) (75 - 107)  BP: 106/74 (10 Shaka 2024 20:41) (106/74 - 129/64)  BP(mean): 82 (10 Shaka 2024 20:41) (82 - 86)  RR: 19 (10 Shaka 2024 20:41) (19 - 22)  SpO2: 94% (11 Jun 2024 01:59) (93% - 95%)    Parameters below as of 11 Jun 2024 01:59  Patient On (Oxygen Delivery Method): mask, Venturi, 50%      PHYSICAL EXAM  General Appearance: cooperative, mild resp distress, 50% venti mask  HEENT: PERRL, conjunctiva clear, EOM's intact, non injected pharynx, no exudate, TM   normal  Neck: Supple, , no adenopathy, thyroid: not enlarged, no carotid bruit or JVD  Lungs: bibasilar crackles persist, no wheeze  Heart: Regular rate and rhythm, S1, S2 normal, no murmur, rub or gallop  Abdomen: Soft, non-tender, bowel sounds active , no hepatosplenomegaly  Extremities: no cyanosis or edema, no joint swelling  Skin: Skin color, texture normal, no rashes   Neurologic: Alert and oriented X3 , cranial nerves intact, sensory and motor normal,    ECG:    LABS:06-10    123<L>  |  91<L>  |  15  ----------------------------<  83  4.0   |  27  |  0.28<L>    Ca    8.4<L>      10 Shaka 2024 06:52                            10.8   19.78 )-----------( 228      ( 06 Jun 2024 06:18 )             30.5   06-06    122<L>  |  91<L>  |  25<H>  ----------------------------<  105<H>  3.9   |  25  |  0.36<L>    Ca    7.7<L>      06 Jun 2024 06:18                            10.8   19.78 )-----------( 228      ( 06 Jun 2024 06:18 )             30.5   06-06    122<L>  |  91<L>  |  25<H>  ----------------------------<  105<H>  3.9   |  25  |  0.36<L>    Ca    7.7<L>      06 Jun 2024 06:18                            11.1   18.29 )-----------( 250      ( 04 Jun 2024 06:47 )             31.7   06-03    126<L>  |  93<L>  |  28<H>  ----------------------------<  170<H>  4.4   |  28  |  0.46<L>    Ca    8.7      03 Jun 2024 06:54                            9.4    6.29  )-----------( 236      ( 24 May 2024 06:41 )             27.7     05-24    135  |  103  |  12  ----------------------------<  242<H>  4.2   |  24  |  0.69    Ca    8.6      24 May 2024 06:41  Phos  3.4     05-24  Mg     1.8     05-24    TPro  6.3  /  Alb  2.1<L>  /  TBili  0.5  /  DBili  x   /  AST  18  /  ALT  26  /  AlkPhos  97  05-24              Urinalysis Basic - ( 24 May 2024 06:41 )    Color: x / Appearance: x / SG: x / pH: x  Gluc: 242 mg/dL / Ketone: x  / Bili: x / Urobili: x   Blood: x / Protein: x / Nitrite: x   Leuk Esterase: x / RBC: x / WBC x   Sq Epi: x / Non Sq Epi: x / Bacteria: x      ABG - ( 29 May 2024 14:51 )  pH, Arterial: 7.43  pH, Blood: x     /  pCO2: 36    /  pO2: 83    / HCO3: 24    / Base Excess: -0.1  /  SaO2: 98          < from: Xray Chest 1 View- PORTABLE-Routine (Xray Chest 1 View- PORTABLE-Routine .) (06.04.24 @ 08:56) >  PROCEDURE DATE:  06/04/2024          INTERPRETATION:  AP chest on June 4, 2024 at 8:46 AM. Patient is short of   breath.    Heart magnified by technique.    On May 27 there are perihilar infiltrates.    On present examination left-sided infiltrates are stable. There are   increasing right upper perihilar infiltrates.    IMPRESSION: Stable left-sided infiltrates. Increasing right upper   perihilar infiltrate.    < end of copied text >            RADIOLOGY & ADDITIONAL STUDIES:  < from: CT Angio Chest PE Protocol w/ IV Cont (05.23.24 @ 11:34) >  IMPRESSION:    No pulmonary embolism through the level of the segmental pulmonary   arteries. Evaluation of some subsegmental pulmonary arteries is limited.    Left lower lobe nodular opacity likely known lung cancer. Left parahilar   radiation changes.    Nonspecific diffuse groundglass throughout the right lung, differential   for which includes pneumonitis or infection.    < end of copied text >    Unchanged right greater than left groundglass opacities, which are   nonspecific, at least some of which may be related to radiation therapy.   Differential diagnostic considerations also but is not limited to include   superimposed inflammatory etiologies.    Unchanged nodular opacities including a dominant 2.6 cm left lower lobe   nodular opacity which may correspond to the patient's history of lung   cancer.    Comparison with prior older chest CTs are recommended for complete   evaluation of the above findings and if images are made available, an   addendum can be issued.    Minimal left pleural fluid with interval decrease in size since May 23,   2024.    < from: Xray Chest 1 View- PORTABLE-Routine (Xray Chest 1 View- PORTABLE-Routine in AM.) (06.07.24 @ 07:38) >    Frontal expiratory view of the chest shows the heart to be similar in   size. The lungs show slight clearing of interstitial markings in the   right lung and progression of interstitial markings in the left lung.   There is no evidence of pneumothorax nor definite pleural effusion.    IMPRESSION:  Changing markings.    < end of copied text >  < from: CT Angio Chest PE Protocol w/ IV Cont (06.10.24 @ 13:41) >  IMPRESSION:    No main, lobar or proximal segmental pulmonary embolus. The distal   segmental/subsegmental branches of the pulmonary arteries are not   evaluated secondary to motion.    In comparison with 5/28/2024, stable left perihilar nodular opacity.   Stable diffuse bilateral ground-glass opacities. New small left pleural   effusion.    < end of copied text >

## 2024-06-11 NOTE — PROGRESS NOTE ADULT - ASSESSMENT
79M with PMH of Lung Ca s/p chemoradiation (last dose of both as per patient in April 2024) and HLD presents with SOB/Cough/Fevers and chills for 2-3 days. onset of cough and MCDONALD 2-3 days prior to admission and since then it has been progressively worsening. Started to have chills 1-2 days prior to admission and these symptoms remains persistent and getting worse. Denies syncope, chest pain, nausea, vomiting, abdominal pain/discomfort. Diarrhea reports (1 episode daily) and non-bloody. Cough productive with yellow and white sputum production. Denies having diagnosis of COPD. Former tobacco use quit many years ago.   5/30/24 Seen and examined at bedside. Seated in chair with no C/O pain or dyspnea at rest. Endorses dyspnea on exertion.  ID [id number].    Assessment and Plan:    1) Lung Ca  -Stage III lung cancer  -S/P neoadjuvant Immune therapy + Chemotherapy with a good response  -S/P Taxol/ Carb + RT  -Now with pneumonia / pneumonitis ? RT related ? prior Immune therapy related  -Anemia   -Onc eval noted  -No evidence of progression of disease    2) Acute Resp failure  -H/O Lung cancer  -S/P treatment  -? pneumonitis  -CXR and CT noted  -antibiotics as per med  -O2 supplementation  -Albuterol/Symbicort  -IV Solumedrol. Down titrate as tolerated    3) Debility  -Weakness  -Dyspnea  -S/P chemo/rad  -Mod protein villa malnutrition  -Nutrition eval  -PT eval    4) Advanced Directives  -Pt with capacity   -GOC done  -DNR DNI   --Family coming to bedside w/ patient to discuss his final decision on hospice at home if possible-- comfort molst to be completed AFTER his family coming to visit. for now DNR DNI only.    Time Spent: 75 minutes including the care, coordination and counseling of this patient, 50% of which was spent coordinating and counseling.

## 2024-06-11 NOTE — PROGRESS NOTE ADULT - ASSESSMENT
79M with PMH of Lung Ca s/p chemoradiation (last dose of both as per patient in April 2024) and HLD presents with SOB/Cough/Fevers and chills for 2-3 days. onset of cough and MCDONALD 2-3 days prior to admission and since then it has been progressively worsening. Started to have chills 1-2 days prior to admission and these symptoms remains persistent and getting worse. Denies syncope, chest pain, nausea, vomiting, abdominal pain/discomfort. Diarrhea reports (1 episode daily) and non-bloody. Cough productive with yellow and white sputum production. Denies having diagnosis of COPD. Former tobacco use quit many years ago.   In the ER Tmax 100.4F, -108, BP 14/83, RR 16-28, SpO2 85% on RA. WBC 9.49, Hgb 11.1, Na 134 otherwise CMP grossly unremarkable. Lactate 1.9, ProBNP 657, Troponin 116.63->149.07.   Left lower lobe nodular opacity likely known lung cancer. Left parahilar   radiation changes. Nonspecific diffuse groundglass throughout the right lung, differential   for which includes pneumonitis or infection. repeat ct chest 5/28 noted    Lung ca s/p chemo RT  suspected pneumonitis  no definite consolidation / pneumonia  adequate oxygenation but hypoxemia on exertion  No hemoptysis or discolored sputum  complete course of antibiotics  slow steroid taper  complete course of steroids and antibiotics  walk test for re eval hypoxemia on exertion  mobilize OOb and re eval oxygenation on exertion  ABG - ( 29 May 2024 14:51 )  pH, Arterial: 7.43  pH, Blood: x     /  pCO2: 36    /  pO2: 83    / HCO3: 24    / Base Excess: -0.1  /  SaO2: 98      Large A-a gradient despite antibiotics and steroids  etiology likley pneumonitis with persistent infiltrates and basilar crackles on exam  will need home o2 set up  switch to po ceftin /lower dose of cefepime IV  Doubt PE with negative CTA and alternate dx available with pneumonitis seen on ct scan  concern wit persistent infiltrates despite treatment  not a good candidate for lung bx  conservative measures appropriate  discussed with hospitalist  all recent films personally reviewed  data discussed with hospitalist  palliative care team noted  re eval for hypoxemia on exertion with walk test while using O2 4liters nc today pls    Dc planning delayed with worsened hyponatremia  repeat cxr today after diuresis noted  walk test while on O2 4 liters nc  Qualified for home O2    6/11  data discussed with pt;s DTR and RN staff at bedside  resp status deterioration addressed by repeat CTA and ABG all reviewed  overall no significant improvement clinically or radiographically despite course of antibiotics and steroids  increased A -a gradient requiring 50% Ventimask  ICU eval noted and no overt aspiration noted  failed to respond and prognosis remains guarded  family is aware and course reviewed with hospitalist caring for him as well  Dr Fontana is covering 6/12-6/13

## 2024-06-11 NOTE — PROGRESS NOTE ADULT - ASSESSMENT
79M with PMH of Lung Ca s/p chemoradiation (last dose of both as per patient in April 2024) and HLD presents with SOB/Cough/Fevers and chills for 2-3 days. onset of cough and MCDONALD 2-3 days prior to admission and since then it has been progressively worsening. Started to have chills 1-2 days prior to admission and these symptoms remains persistent and getting worse. Denies syncope, chest pain, nausea, vomiting, abdominal pain/discomfort. Diarrhea reports (1 episode daily) and non-bloody. Cough productive with yellow and white sputum production. Denies having diagnosis of COPD. Former tobacco use quit many years ago.     In the ER Tmax 100.4F, -108, BP 14/83, RR 16-28, SpO2 85% on RA. WBC 9.49, Hgb 11.1, Na 134 otherwise CMP grossly unremarkable. Lactate 1.9, ProBNP 657, Troponin 116.63->149.07.     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    Repeat urine studies to confirm.    With poor resp status and new left pleural effusion, hold further hypertonic fluid,    Trial of NACL tabs and Demeclocycline for now.    6/11 MK  - hyponatremia / SIADH with lung ca     cw nacl tabs and demeclocycline   dw family at bedside

## 2024-06-11 NOTE — PROGRESS NOTE ADULT - ASSESSMENT
79 man with HLD, hx of lung cancer s/p chemoradiation, last dose of both as per patient was in April 2024, p/w dyspnea, cough, fever, chills for 2-3 days, progressively worsened. Cough was productive of whitish yellow sputum. In the ED Tm 100.4. HR 100s. RR up to 28. O2 85% on room air. WBC 9.49. Hgb 11.1. Na 134 otherwise CMP grossly unremarkable. Lactate 1.9, ProBNP 657, Troponin 116.63->149.07. CTA with no pulmonary embolism through the level of the segmental pulmonary arteries. Evaluation of some subsegmental pulmonary arteries was limited. Left lower lobe nodular opacity likely known lung cancer. Left parahilar radiation changes. Nonspecific diffuse groundglass throughout the right lung, differential for which included pneumonitis or infection. Admitted to Medicine.    Acute hypoxic respiratory failure  Diagnosed with pneumonia upon admission. Has some subjective improvement with course of broad spectrum antibiotics. Continues to require supplemental O2, however. Appreciate Pulmonary Medicine input, may be related to pneumonitis. Treated with systemic steroids. Qualified for home O2 and was approaching stability for DC home with home O2 pending improvement in hyponatremia (see below). Today, he is with increase in dyspnea and increase in O2 requirement. Unclear if related to trial of 1.5% of HTS yesterday. Obtained CTA chest. No main, lobar or proximal segmental pulmonary embolus. The distal segmental/subsegmental branches of the pulmonary arteries were not evaluated secondary to motion. In comparison with 5/28/2024, stable left perihilar nodular opacity. Stable diffuse bilateral ground-glass opacities. New small left pleural effusion. S/P IV Lasix x 1. Increased prednisone back up to methylprednisolone 40mg q12, continued Symbicort and albuterol. Clinically unchanged.  - Continue steroids, bronchodilators  - O2 supplementation, wean as tolerated    Severe sepsis due to pneumonia, unable to rule out Gram-negative organism  Has underlying lung cancer s/p chemoradiation treatment, last treatment 4/2024 per patient. Cultures negative to date. Completed course of cefepime and doxycycline.  - Continue to monitor    Elevated troponin  Likely myocardial demand ischemia without infarction. 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 Nephrology evaluation noted, NaCl tabs started BID, still  Na not improved. Likely worsening by poor osmolar intake. Did not improve with Trial of 1.5% of HTS.   - Demeclocycline as per Nephrology  - Increase po intake   - Fluid restriction, limit free water intake  - Repeat AM labs    Prediabetes with hyperglycemia due to steroids  A1c 6.4% but glucose uncontrolled in setting of system corticosteroids  - Increased Lantus and Lispro today, titrate accordingly.   - Continue to monitor glucose     Severe protein calorie malnutrition  Appreciate dietician input  - Add thiamine and MVI daily  - Ensure Plus High Protein TID       Commode  3:1 commode;  pt will require a 3:1 commode as pt incapable of ambulating to bathroom as patient is confined to a single room without a bathroom.    Transport Wheelchair  This patient has a mobility limitation that significantly impairs the patients ability to participate in one or more MRADL's such as: toileting, eating, dressing and bathing in customary locations in the home. Patient's home provides adequate access between rooms for the use of the manual wheelchair. The wheelchair will significantly improve patient's ability to participate in MRADL's and will be used on a regular basis in the home. The patient's mobility limitation cannot be resolved by the use of a walker or cane. This patient does not have the upper extremity function to safely propel the manual wheelchair. Patient has a 24/7 care giver in the home who is willing to propel the wheelchair at any given time. The patient has agreed to use the wheelchair on a daily basis in the home. Diagnosis: Lung Cancer, Pneumonitis, weakness, FTT, COPD           52 minutes spent on total encounter. The necessity of the time spent during the encounter on this date of service was due to reviewing chart notes and data, seeing and evaluating patient, coordinating care including discussing case with consultants from Pulmonary Medicine, Nephrology and Critical Care Team and Palliative Care Team, formulating and executing care plan with placement of new orders, and documenting today's visit, findings and plan.         79 year-old man with HLD, hx of lung cancer s/p chemoradiation, last dose of both as per patient was in April 2024, p/w dyspnea, cough, fever, chills for 2-3 days, progressively worsened. Cough was productive of whitish yellow sputum. In the ED Tm 100.4. HR 100s. RR up to 28. O2 85% on room air. WBC 9.49. Hgb 11.1. Na 134 otherwise CMP grossly unremarkable. Lactate 1.9, ProBNP 657, Troponin 116.63->149.07. CTA with no pulmonary embolism through the level of the segmental pulmonary arteries. Evaluation of some subsegmental pulmonary arteries was limited. Left lower lobe nodular opacity likely known lung cancer. Left parahilar radiation changes. Nonspecific diffuse groundglass throughout the right lung, differential for which included pneumonitis or infection. Admitted to Medicine.    Acute hypoxic respiratory failure  Diagnosed with pneumonia upon admission. Had some subjective improvement with course of broad spectrum antibiotics but continued to require supplemental O2. Patient was seen and evaluated by Pulmonary Medicine who suspect he may have pneumonitis. He was treated with systemic steroids and improved a bit further albeit still O2-dependent. He qualified for home O2 and was approaching stability for DC home with home O2 pending improvement in hyponatremia (see below) when yesterday he was noted to be developing increasing dyspnea and increasing O2 requirement. Unclear if related to trial of 1.5% of HTS. Obtained CTA chest. No main, lobar or proximal segmental pulmonary embolus. The distal segmental/subsegmental branches of the pulmonary arteries were not evaluated secondary to motion. In comparison with 5/28/2024, stable left perihilar nodular opacity. Stable diffuse bilateral ground-glass opacities. New small left pleural effusion. S/P IV Lasix x 1. Increased prednisone back up to methylprednisolone 40mg q12, continued Symbicort and albuterol. Clinically unchanged today. Now on 50% ventimask with SPO2 low-mid 90s.  - Palliative Care consult requested given prognosis  - Continue steroids, bronchodilators  - O2 supplementation, wean as tolerated    Severe sepsis due to pneumonia, unable to rule out Gram-negative organism  Has underlying lung cancer s/p chemoradiation treatment, last treatment 4/2024 per patient. Cultures negative to date. Completed course of cefepime and doxycycline.  - Continue to monitor    Elevated troponin  Likely myocardial demand ischemia without infarction. 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 Nephrology evaluation noted, NaCl tabs started BID, still  Na not improved. Likely worsening by poor osmolar intake. Did not improve with Trial of 1.5% of HTS.   - Demeclocycline as per Nephrology  - Increase po intake   - Fluid restriction, limit free water intake  - Repeat AM labs    Prediabetes with hyperglycemia due to steroids  A1c 6.4% but glucose uncontrolled in setting of system corticosteroids  - Increased Lantus and Lispro today, titrate accordingly.   - Continue to monitor glucose     Severe protein calorie malnutrition  Appreciate dietician input  - Add thiamine and MVI daily  - Ensure Plus High Protein TID      DVT px: LMWH  Code status: DNR, DNI  Dispo: Patient and family met with Palliative Care Team today. Patient now DNR. DNI. Patient and family are considering transition to comfort-focused care plan, hospice.           Commode  3:1 commode;  pt will require a 3:1 commode as pt incapable of ambulating to bathroom as patient is confined to a single room without a bathroom.    Transport Wheelchair  This patient has a mobility limitation that significantly impairs the patients ability to participate in one or more MRADL's such as: toileting, eating, dressing and bathing in customary locations in the home. Patient's home provides adequate access between rooms for the use of the manual wheelchair. The wheelchair will significantly improve patient's ability to participate in MRADL's and will be used on a regular basis in the home. The patient's mobility limitation cannot be resolved by the use of a walker or cane. This patient does not have the upper extremity function to safely propel the manual wheelchair. Patient has a 24/7 care giver in the home who is willing to propel the wheelchair at any given time. The patient has agreed to use the wheelchair on a daily basis in the home. Diagnosis: Lung Cancer, Pneumonitis, weakness, FTT, COPD         52 minutes spent on total encounter. The necessity of the time spent during the encounter on this date of service was due to reviewing chart notes and data, seeing and evaluating patient, coordinating care including discussing case with consultants from Pulmonary Medicine, Nephrology and Critical Care Team and Palliative Care Team, formulating and executing care plan with placement of new orders, and documenting today's visit, findings and plan.

## 2024-06-12 NOTE — PROGRESS NOTE ADULT - SUBJECTIVE AND OBJECTIVE BOX
79M with PMH of Lung Ca s/p chemoradiation (last dose of both as per patient in April 2024) and HLD presents with SOB/Cough/Fevers and chills for 2-3 days. onset of cough and MCDONALD 2-3 days prior to admission and since then it has been progressively worsening. Started to have chills 1-2 days prior to admission and these symptoms remains persistent and getting worse. Denies syncope, chest pain, nausea, vomiting, abdominal pain/discomfort. Diarrhea reports (1 episode daily) and non-bloody. Cough productive with yellow and white sputum production. Denies having diagnosis of COPD. Former tobacco use quit many years ago. Primary Greek speaking. ID below  5/30/24 Seen and examined at bedside. Seated in chair with no C/O pain or dyspnea at rest. Endorses dyspnea on exertion.  ID [id number].      6/11  pt seen and examined  on venti mask  has been admitted 3 weeks with no significant improvement today    Family and i had discussion with him at bedside re: GOC AD and Hospice.        We discussed Palliative Care team being a supportive team when a patient has ongoing illnesses.  We also discussed that it is not an end of life care service, but can help navigate symptoms and emotional support throughout their hospital stay here.     Hospice was explained as well as an end of life care philosophy. When a disease cannot be cured, or family/patient decide the treatment burdens out weight the risk and chose to change focus of treatment from cure to quality/comfort.       We discussed at length patients underlying clinical diagnosis at length.  We discussed resuscitation and risk and benefits of CPR. Risks include, broken ribs, lung puncture, pain and discomfort.   At this time due to patients underlying irreversible diagnosis of pneumonitis  I would not recommend CPR because it would not reverse current medical condition.  They understand that DNR means NO CPR and that would allow natural death.  No CPR means no attempt to restart the heart once it has stopped.  Patient verbalized understanding.     We also discussed mechanical ventilation in  an event that they could no longer breath on their own.  Risk and benefits of mechanical ventilation were discussed at length.  At this time, due to patients irreversible medical condition of pneumonitis   it is of concern that if patient were to be intubated, extubation may not be possible.  Also intubation would not reverse current medical condition which if progresses its natural course would lead to the patients death.  Patient was able to verbalized understanding.     6/12  Patient was seen and examined.  Denies nausea, vomiting, shortness of breath, chest pain, headaches, abdominal pain, constipation     Long discussion with family at this stage, we are recommending comfort measures as patient continues to decline regardless of maximal support.  We discussed prolonging suffering rather than prolonging life.     We discussed what that would mean,  no longer doing blood tests, dx imaging, abx, and palliative extubation to RA with comfort medications to address any symptoms that may arise. This would mean shortened life span but would focus on comfort and quality at the end of life.     Patient opted comfort only- and is deciding on hospice referrals w/ family [facility] vs. [facility]     PAIN: ( )Yes   (X )No    DYSPNEA: (X ) Yes  ( ) No  On exertion       Review of Systems:  Physical Discomfort-mild  Dyspnea-on exertion  Anorexia-mild  Fatigue-mod-severe  Weakness-mod    All other systems reviewed and negative      PHYSICAL EXAM:  ICU Vital Signs Last 24 Hrs  T(C): 36.3 (11 Jun 2024 07:53), Max: 36.7 (10 Shaka 2024 15:20)  T(F): 97.4 (11 Jun 2024 07:53), Max: 98.1 (10 Shaka 2024 15:20)  HR: 101 (11 Jun 2024 09:40) (75 - 107)  BP: 126/66 (11 Jun 2024 07:53) (106/74 - 126/66)  BP(mean): 81 (11 Jun 2024 07:53) (81 - 86)  ABP: --  ABP(mean): --  RR: 18 (11 Jun 2024 07:53) (18 - 22)  SpO2: 94% (11 Jun 2024 09:40) (93% - 95%)    O2 Parameters below as of 11 Jun 2024 09:40  Patient On (Oxygen Delivery Method): mask, Venturi       PPSV2: 30  %  General: Elderly male in chair in NAD  Mental Status: A&O X3  HEENT: oral mucosa moist/nasal o2  Lungs: clear ollie to auscultation  Cardiac: S1S2+  GI: abd soft Nt ND + BS  : voids  Ext: BOCANEGRA=strength  Neuro: no focal def      LABS:                        11.2   16.71 )-----------( 186      ( 11 Jun 2024 07:50 )             31.8     06-11    126<L>  |  91<L>  |  24<H>  ----------------------------<  145<H>  4.2   |  27  |  0.44<L>    Ca    8.8      11 Jun 2024 07:50          Albumin: Albumin: 2.6 g/dL (05-28 @ 07:24)      Allergies    No Known Allergies    Intolerances      MEDICATIONS  (STANDING):  albuterol    0.083% 2.5 milliGRAM(s) Nebulizer every 6 hours  albuterol    90 MICROgram(s) HFA Inhaler 1 Puff(s) Inhalation every 4 hours  aspirin enteric coated 81 milliGRAM(s) Oral daily  atorvastatin 40 milliGRAM(s) Oral at bedtime  budesonide 160 MICROgram(s)/formoterol 4.5 MICROgram(s) Inhaler 2 Puff(s) Inhalation two times a day  clotrimazole Lozenge 1 Lozenge Oral <User Schedule>  dextrose 10% Bolus 125 milliLiter(s) IV Bolus once  dextrose 5%. 1000 milliLiter(s) (50 mL/Hr) IV Continuous <Continuous>  dextrose 5%. 1000 milliLiter(s) (100 mL/Hr) IV Continuous <Continuous>  dextrose 50% Injectable 25 Gram(s) IV Push once  dextrose 50% Injectable 12.5 Gram(s) IV Push once  enoxaparin Injectable 40 milliGRAM(s) SubCutaneous every 24 hours  glucagon  Injectable 1 milliGRAM(s) IntraMuscular once  insulin glargine Injectable (LANTUS) 5 Unit(s) SubCutaneous every morning  insulin lispro (ADMELOG) corrective regimen sliding scale   SubCutaneous three times a day before meals  insulin lispro (ADMELOG) corrective regimen sliding scale   SubCutaneous at bedtime  insulin lispro Injectable (ADMELOG) 2 Unit(s) SubCutaneous three times a day before meals  lisinopril 10 milliGRAM(s) Oral daily  methylPREDNISolone sodium succinate Injectable 40 milliGRAM(s) IV Push every 6 hours  metoprolol succinate ER 25 milliGRAM(s) Oral daily  tiotropium 2.5 MICROgram(s) Inhaler 2 Puff(s) Inhalation daily    MEDICATIONS  (PRN):  acetaminophen     Tablet .. 650 milliGRAM(s) Oral every 6 hours PRN Temp greater or equal to 38C (100.4F), Mild Pain (1 - 3)  aluminum hydroxide/magnesium hydroxide/simethicone Suspension 30 milliLiter(s) Oral every 4 hours PRN Dyspepsia  benzonatate 100 milliGRAM(s) Oral every 8 hours PRN Cough  dextrose Oral Gel 15 Gram(s) Oral once PRN Blood Glucose LESS THAN 70 milliGRAM(s)/deciliter  guaiFENesin Oral Liquid (Sugar-Free) 100 milliGRAM(s) Oral every 6 hours PRN Cough  melatonin 3 milliGRAM(s) Oral at bedtime PRN Insomnia  ondansetron Injectable 4 milliGRAM(s) IV Push every 8 hours PRN Nausea and/or Vomiting      RADIOLOGY/ADDITIONAL STUDIES:    < from: CT Chest No Cont (05.28.24 @ 15:45) >    ACC: [id number] EXAM:  CT CHEST   ORDERED BY: ASTER GEIGER     PROCEDURE DATE:  05/28/2024          INTERPRETATION:  CLINICAL INFORMATION: Hypoxia. Reevaluation of   pneumonia/pneumonitis    COMPARISON: CTA chest 5/23/2024    CONTRAST/COMPLICATIONS:  IV Contrast: NONE  Oral Contrast: NONE  Complications: None reported at time of study completion    PROCEDURE:  CT of the Chest was performed.  Sagittal and coronal reformats were performed.    FINDINGS:    LUNGS AND AIRWAYS: Patent central airways.  Emphysema. Ill-defined   previously described 2.6 cm nodular opacity in the left lower lobe is   unchanged may correspond to the patient's given history of lung cancer.   Groundglass opacities in the central portions of the left upper and left   lowerlobes are unchanged from 5/23/2024. No significant change in   diffuse right greater than left groundglass opacities with a mid to lower   lung predominance. Few small nodules, for reference a 6 mm nodule in the   left upper lobe (2, 56) and a 5 mm nodule in the right upper lobe (2, 70)   are unchanged. Multiple chronic cysts in the dependent portions of the   lower lobes, larger on the right.  PLEURA: Decrease in size of now trace left pleural effusion.  MEDIASTINUM AND EFREN: Unchanged mildly prominent mediastinal   lymphadenopathy. For reference, 1.3 cm subcarinal lymph node previously   measured 1.3 cm.  VESSELS: Coronary artery and aortic calcifications.Mild enlargement of   the aortic root at the sinus of Valsalva measuring up to 4.2 cm as   correlated with prior contrast enhanced CT. The ascending aorta measures   up to 3.0 cm, and the descending thoracic aorta measuring up to 2.7 cm   when correlated with prior contrast enhanced CT.  HEART: Cardiomegaly. Trace pericardial fluid.  CHEST WALL AND LOWER NECK: Within normal limits.  VISUALIZED UPPER ABDOMEN: Partially imaged left renal cyst.  BONES: Degenerative changes.    IMPRESSION:    Unchanged right greater than left groundglass opacities, which are   nonspecific, at least some of which may be related to radiation therapy.   Differential diagnostic considerations also but is not limited to include   superimposed inflammatory etiologies.    Unchanged nodular opacities including a dominant 2.6 cm left lower lobe   nodular opacity which may correspond to the patient's history of lung   cancer.    Comparison with prior older chest CTs are recommended for complete   evaluation of the above findings and if images are made available, an   addendum can be issued.    Minimal left pleural fluid with interval decrease in size since May 23,   2024.    < from: Xray Chest 2 Views PA/Lat (05.27.24 @ 09:31) >    ACC: [id number] EXAM:  XR CHEST PA LAT 2V   ORDERED BY: ASTER GEIGER     PROCEDURE DATE:  05/27/2024          INTERPRETATION:  PA and lateral chest radiographs    COMPARISON: CT chest 5/23/2004 and chest x-ray 5/23/2004.  CLINICAL INFORMATION: Hypoxicrespiratory failure.. Follow-up    FINDINGS:  CATHETERS AND TUBES: None    PULMONARY: Pulmonary vascular congestion with bilateral perihilar/LEFT   lower zone interstitial/alveolar airspace disease. No pleural effusion or   pneumothorax.    HEART/VASCULAR: The  heart is enlarged in transverse diameter. .    BONES: The visualized osseous thorax is intact.    IMPRESSION:  No significant change.  Pulmonary vascular congestion with bilateral perihilar/LEFT lower zone   interstitial/alveolar airspace disease    < from: CT Angio Chest PE Protocol w/ IV Cont (05.23.24 @ 11:34) >    ACC: [id number] EXAM:  CT ANGIO CHEST PULM ART WAWIC   ORDERED BY: LINDA ARGUETA     PROCEDURE DATE:  05/23/2024          INTERPRETATION:  INDICATION: Shortness of breath. 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 Completed multiple   cycles of chemotherapy, currently on keytruda and is currently undergoing   radiotherapy to the left lung.    TECHNIQUE: Helical acquisition of the chest after the administration of   70 mL of Omnipaque 350. Maximum intensity projection images were   generated.    COMPARISON: None.    FINDINGS:    HEART/VASCULATURE: No pulmonary embolism to the level of the segmental   arteries bilaterally. Evaluation of some subsegmental arteries is limited   due respiratory motion. Enlarged right ventricle relative to the left   ventricle. No pericardial effusion.    LUNGS/AIRWAYS/PLEURA: Central airways are patent. Emphysema. Well   marginated opacities in the central portion of the left upper and lower   lobes. Central left lower lobe 2.6cm nodular opacity (2-66). Diffuse   groundglass in the right upper, middle, and lower lobes. Few small lung   nodules, for example, 6 mm in the left upper lobe, (2:53), and 5 mm in   the right upper lobe (2:70). Left upper lobe calcified granuloma. Trace   left pleural effusion.      LYMPH NODES/MEDIASTINUM: 1.3 cm subcarinal lymph node. Few other   intrathoracic lymph nodes up to 1 cm. Subcentimeter left supraclavicular   lymph node.    UPPER ABDOMEN: 3.0 cm midpole left renal cyst. No acute intra-abdominal   findings of the partially visualized upper abdomen.    BONES/SOFT TISSUES: Degenerative changes. No aggressive osseous lesions.      IMPRESSION:    No pulmonary embolism through the level of the segmental pulmonary   arteries. Evaluation of some subsegmental pulmonary arteries is limited.    Left lower lobe nodular opacity likely known lung cancer. Left parahilar   radiation changes.    Nonspecific diffuse groundglass throughout the right lung, differential   for which includes pneumonitis or infection.      < end of copied text >

## 2024-06-12 NOTE — PROGRESS NOTE ADULT - CONVERSATION DETAILS
We discussed Palliative Care team being a supportive team when a patient has ongoing illnesses.  We also discussed that it is not an end of life care service, but can help navigate symptoms and emotional support throughout their hospital stay here.     Hospice was explained as well as an end of life care philosophy. When a disease cannot be cured, or family/patient decide the treatment burdens out weight the risk and chose to change focus of treatment from cure to quality/comfort.       We discussed at length patients underlying clinical diagnosis at length.  We discussed resuscitation and risk and benefits of CPR. Risks include, broken ribs, lung puncture, pain and discomfort.   At this time due to patients underlying irreversible diagnosis of pneumonitis  I would not recommend CPR because it would not reverse current medical condition.  They understand that DNR means NO CPR and that would allow natural death.  No CPR means no attempt to restart the heart once it has stopped.  Patient verbalized understanding.     We also discussed mechanical ventilation in  an event that they could no longer breath on their own.  Risk and benefits of mechanical ventilation were discussed at length.  At this time, due to patients irreversible medical condition of pneumonitis   it is of concern that if patient were to be intubated, extubation may not be possible.  Also intubation would not reverse current medical condition which if progresses its natural course would lead to the patients death.  Patient was able to verbalized understanding.
Long discussion with family at this stage, we are recommending comfort measures as patient continues to decline regardless of maximal support.  We discussed prolonging suffering rather than prolonging life.     We discussed what that would mean,  no longer doing blood tests, dx imaging, abx, and palliative extubation to RA with comfort medications to address any symptoms that may arise. This would mean shortened life span but would focus on comfort and quality at the end of life.     Patients daughter bedside had more questions re: comfort/hospice    pt wants only comfort at this time
Goals of Care (GOC)/Advance Care Planning (ACP)  Date of Discussion/evaluation: 5/24/2024  Purpose of Discussion: In the setting of advanced age and multiple comorbidities, discussion of patient's wishes should there be any deterioration in health status.   Parties Present/Discussed with: Patient and myself; Daughter at bedside  Patient's Decision making capacity at the time of discussion: AAOx4 and has full medical decision making capacity  Presentation: As above  GOC: Code Status, Alternative Feeding Methods, Blood product Transfusions    PLAN:  Code Status: Full code  Alternative Feeding methods: Would like to discuss or assess should the situation arise that it requires alternative feeding methods  Blood Product Transfusions: YES agreeable  Pressors: YES agreeable    ACP/GOC Time Spent: 17 minutes

## 2024-06-12 NOTE — PROGRESS NOTE ADULT - ASSESSMENT
Stage III lung cancer  S/P neoadjuvant Immune therapy + Chemotherapy with a good response  S/P Taxol/ Carb + RT  Now possible pneumonia/pneumonitis  radiation pneumonitis or immunotherapy related pneumonitis  on antibiotics and steroids  not improving    Plan  steroids 1 mg/kg prednisone equivalent with slow taper  DW pulmonary  as per pulmonary , respiratory status too fragile for broch

## 2024-06-12 NOTE — PROGRESS NOTE ADULT - SUBJECTIVE AND OBJECTIVE BOX
Chief Complaint: Fever, dyspnea, cough    Interval Hx: Pt seen and evaluated. Reports of cough. Has no other acute complaints. Seen by palliative is now comfort measures only.     ROS: Multi system review is comprehensively negative x 10 systems except as above    Vitals:  Vital Signs Last 24 Hrs  T(C): 36.6 (12 Jun 2024 07:54), Max: 36.6 (12 Jun 2024 07:54)  T(F): 97.8 (12 Jun 2024 07:54), Max: 97.8 (12 Jun 2024 07:54)  HR: 89 (12 Jun 2024 07:54) (77 - 89)  BP: 134/63 (12 Jun 2024 07:54) (101/56 - 134/63)  RR: 20 (12 Jun 2024 07:54) (18 - 20)  SpO2: 99% (12 Jun 2024 07:54) (94% - 99%)    Parameters below as of 12 Jun 2024 07:54  Patient On (Oxygen Delivery Method): mask, nonrebreather      Exam:  Gen: No acute distress  HEENT: PERRL, conjunctiva clear, EOM's intact, non injected pharynx, no exudates  Neck: Supple, no adenopathy, thyroid not enlarged, no carotid bruit or JVD  Back: No tenderness  Lungs: Normal resp effort at rest, bibasilar crackles persist, no wheeze  Heart: S1, S2 normal, RRR  Abdomen: Soft, non-tender, bowel sounds active  Extremities: No edema, tenderness, no synovitis  Skin: Normal skin color, no rashes   Neurologic: Awake and alert, answers simple questions, follows commands    Labs:                        10.9   18.49 )-----------( 196      ( 12 Jun 2024 07:11 )             31.9   06-12    127<L>  |  93<L>  |  28<H>  ----------------------------<  126<H>  4.5   |  28  |  0.35<L>    Ca    8.6      12 Jun 2024 07:11                   Imaging:  CTA chest PE protocol W/ IV cont 6/10: No main, lobar or proximal segmental pulmonary embolus. The distal segmental/subsegmental branches of the pulmonary arteries were not evaluated secondary to motion. In comparison with 5/28/2024, stable left perihilar nodular opacity. Stable diffuse bilateral ground-glass opacities. New small left pleural effusion.     CXR 6/7: Frontal expiratory view of the chest shows the heart to be similar in size. The lungs show slight clearing of interstitial markings in the right lung and progression of interstitial markings in the left lung. There is no evidence of pneumothorax nor definite pleural effusion.    CXR 6/4: Heart magnified by technique. On May 27 there are perihilar infiltrates. On present examination left-sided infiltrates are stable. There are increasing right upper perihilar infiltrates.    CT chest WO 5/28: As compared to CT chest from 5/23, unchanged right greater than left groundglass opacities, which are nonspecific, at least some of which may be related to radiation therapy. Differential diagnostic considerations also but is not limited to include superimposed inflammatory etiologies. Unchanged nodular opacities including a dominant 2.6 cm left lower lobe nodular opacity which may correspond to the patient's history of lung cancer. Minimal left pleural fluid with interval decrease in size since May 23, 2024.    CTA chest W/ IV cont 5/23: No pulmonary embolism through the level of the segmental pulmonary arteries. Evaluation of some subsegmental pulmonary arteries is limited. Left lower lobe nodular opacity likely known lung cancer. Left parahilar radiation changes. Nonspecific diffuse groundglass throughout the right lung, differential for which includes pneumonitis or infection.    Cardiac Testing:  TTE 5/24: Left ventricular cavity is normal in size. Left ventricular systolic function is normal with an ejection fraction of 52 % by 3D with an ejection fraction visually estimated at 50 to 55 %. Normal right ventricular cavity size, with normal wall thickness, and normal systolic function. Mild mitral regurgitation. Mild aortic regurgitation.    EKG 5/23: Normal sinus rhythm. Normal ECG    Meds:  MEDICATIONS  (STANDING):  albuterol    0.083% 2.5 milliGRAM(s) Nebulizer every 6 hours  albuterol    90 MICROgram(s) HFA Inhaler 1 Puff(s) Inhalation every 4 hours  aspirin enteric coated 81 milliGRAM(s) Oral daily  atorvastatin 40 milliGRAM(s) Oral at bedtime  budesonide 160 MICROgram(s)/formoterol 4.5 MICROgram(s) Inhaler 2 Puff(s) Inhalation two times a day  clotrimazole Lozenge 1 Lozenge Oral <User Schedule>  demeclocycline 300 milliGRAM(s) Oral two times a day  dextrose 10% Bolus 125 milliLiter(s) IV Bolus once  dextrose 5%. 1000 milliLiter(s) (50 mL/Hr) IV Continuous <Continuous>  dextrose 5%. 1000 milliLiter(s) (100 mL/Hr) IV Continuous <Continuous>  dextrose 50% Injectable 25 Gram(s) IV Push once  dextrose 50% Injectable 12.5 Gram(s) IV Push once  enoxaparin Injectable 40 milliGRAM(s) SubCutaneous every 24 hours  glucagon  Injectable 1 milliGRAM(s) IntraMuscular once  insulin glargine Injectable (LANTUS) 8 Unit(s) SubCutaneous at bedtime  insulin lispro (ADMELOG) corrective regimen sliding scale   SubCutaneous three times a day before meals  insulin lispro (ADMELOG) corrective regimen sliding scale   SubCutaneous at bedtime  insulin lispro Injectable (ADMELOG) 3 Unit(s) SubCutaneous three times a day before meals  lisinopril 10 milliGRAM(s) Oral daily  methylPREDNISolone sodium succinate Injectable 40 milliGRAM(s) IV Push every 12 hours  metoprolol succinate ER 25 milliGRAM(s) Oral daily  multivitamin/minerals 1 Tablet(s) Oral daily  polyethylene glycol 3350 17 Gram(s) Oral daily  senna 2 Tablet(s) Oral at bedtime  sodium chloride 1 Gram(s) Oral three times a day  thiamine 100 milliGRAM(s) Oral daily  tiotropium 2.5 MICROgram(s) Inhaler 2 Puff(s) Inhalation daily    MEDICATIONS  (PRN):  acetaminophen     Tablet .. 650 milliGRAM(s) Oral every 6 hours PRN Temp greater or equal to 38C (100.4F), Mild Pain (1 - 3)  aluminum hydroxide/magnesium hydroxide/simethicone Suspension 30 milliLiter(s) Oral every 4 hours PRN Dyspepsia  benzonatate 100 milliGRAM(s) Oral every 8 hours PRN Cough  dextrose Oral Gel 15 Gram(s) Oral once PRN Blood Glucose LESS THAN 70 milliGRAM(s)/deciliter  guaiFENesin Oral Liquid (Sugar-Free) 100 milliGRAM(s) Oral every 6 hours PRN Cough  melatonin 3 milliGRAM(s) Oral at bedtime PRN Insomnia  ondansetron Injectable 4 milliGRAM(s) IV Push every 8 hours PRN Nausea and/or Vomiting

## 2024-06-12 NOTE — PROGRESS NOTE ADULT - ASSESSMENT
79M with PMH of Lung Ca s/p chemoradiation (last dose of both as per patient in April 2024) and HLD presents with SOB/Cough/Fevers and chills for 2-3 days. onset of cough and MCDONALD 2-3 days prior to admission and since then it has been progressively worsening. Started to have chills 1-2 days prior to admission and these symptoms remains persistent and getting worse. Denies syncope, chest pain, nausea, vomiting, abdominal pain/discomfort. Diarrhea reports (1 episode daily) and non-bloody. Cough productive with yellow and white sputum production. Denies having diagnosis of COPD. Former tobacco use quit many years ago.   5/30/24 Seen and examined at bedside. Seated in chair with no C/O pain or dyspnea at rest. Endorses dyspnea on exertion.  ID [id number].    Assessment and Plan:    1) Lung Ca  -Stage III lung cancer  -S/P neoadjuvant Immune therapy + Chemotherapy with a good response  -S/P Taxol/ Carb + RT  -Now with pneumonia / pneumonitis ? RT related ? prior Immune therapy related  -Anemia   -Onc eval noted  -No evidence of progression of disease    2) Acute Resp failure  -H/O Lung cancer  -S/P treatment  -? pneumonitis  -CXR and CT noted  -antibiotics as per med  -O2 supplementation  -Albuterol/Symbicort  -IV Solumedrol. Down titrate as tolerated    3) Debility  -Weakness  -Dyspnea  -S/P chemo/rad  -Mod protein villa malnutrition  -Nutrition eval  -PT eval    4) Advanced Directives  -Pt with capacity   -GOC done  - dnr dni comfort only     Time Spent: 50 minutes including the care, coordination and counseling of this patient, 50% of which was spent coordinating and counseling.

## 2024-06-12 NOTE — PROGRESS NOTE ADULT - ASSESSMENT
79 year-old man with HLD, hx of lung cancer s/p chemoradiation, last dose of both as per patient was in April 2024, p/w dyspnea, cough, fever, chills for 2-3 days, progressively worsened. Cough was productive of whitish yellow sputum. In the ED Tm 100.4. HR 100s. RR up to 28. O2 85% on room air. WBC 9.49. Hgb 11.1. Na 134 otherwise CMP grossly unremarkable. Lactate 1.9, ProBNP 657, Troponin 116.63->149.07. CTA with no pulmonary embolism through the level of the segmental pulmonary arteries. Evaluation of some subsegmental pulmonary arteries was limited. Left lower lobe nodular opacity likely known lung cancer. Left parahilar radiation changes. Nonspecific diffuse groundglass throughout the right lung, differential for which included pneumonitis or infection. Admitted to Medicine.    Acute hypoxic respiratory failure  Diagnosed with pneumonia upon admission. Had some subjective improvement with course of broad spectrum antibiotics but continued to require supplemental O2. Patient was seen and evaluated by Pulmonary Medicine who suspect he may have pneumonitis. He was treated with systemic steroids and improved a bit further albeit still O2-dependent. He qualified for home O2 and was approaching stability for DC home with home O2 pending improvement in hyponatremia (see below) when on 06/10/22 he was noted to be developing increasing dyspnea and increasing O2 requirement. CTA chest. No main, lobar or proximal segmental pulmonary embolus. The distal segmental/subsegmental branches of the pulmonary arteries were not evaluated secondary to motion. In comparison with 5/28/2024, stable left perihilar nodular opacity. Stable diffuse bilateral ground-glass opacities. New small left pleural effusion. S/P IV Lasix x 1. Increased prednisone back up to methylprednisolone 40mg q12, continued Symbicort and albuterol. Clinically unchanged today. Now on 50% ventimask with SPO2 low-mid 90s.  - See and evaluated: currently on comfort measures only.   - Continue steroids, bronchodilators  - O2 supplementation, wean as tolerated    Severe sepsis due to pneumonia, unable to rule out Gram-negative organism  Has underlying lung cancer s/p chemoradiation treatment, last treatment 4/2024 per patient. Cultures negative to date. Completed course of cefepime and doxycycline.  - Continue to monitor    Elevated troponin  Likely myocardial demand ischemia without infarction. 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 Nephrology evaluation noted, NaCl tabs started BID, still  Na not improved. Likely worsening by poor osmolar intake. Did not improve with Trial of 1.5% of HTS.   - Demeclocycline as per Nephrology  - Increase po intake   - Fluid restriction, limit free water intake      Prediabetes with hyperglycemia due to steroids  A1c 6.4% but glucose uncontrolled in setting of system corticosteroids  -  Lantus and Lispro   - Continue to monitor glucose     Severe protein calorie malnutrition  Appreciate dietician input  - Add thiamine and MVI daily  - Ensure Plus High Protein TID      DVT px: LMWH  Code status: DNR, DNI, comfort measures only   Dispo: home hospice          Commode  3:1 commode;  pt will require a 3:1 commode as pt incapable of ambulating to bathroom as patient is confined to a single room without a bathroom.    Transport Wheelchair  This patient has a mobility limitation that significantly impairs the patients ability to participate in one or more MRADL's such as: toileting, eating, dressing and bathing in customary locations in the home. Patient's home provides adequate access between rooms for the use of the manual wheelchair. The wheelchair will significantly improve patient's ability to participate in MRADL's and will be used on a regular basis in the home. The patient's mobility limitation cannot be resolved by the use of a walker or cane. This patient does not have the upper extremity function to safely propel the manual wheelchair. Patient has a 24/7 care giver in the home who is willing to propel the wheelchair at any given time. The patient has agreed to use the wheelchair on a daily basis in the home. Diagnosis: Lung Cancer, Pneumonitis, weakness, FTT, COPD           52 minutes spent on total encounter. The necessity of the time spent during the encounter on this date of service was due to reviewing chart notes and data, seeing and evaluating patient, coordinating care including discussing case with consultants from Pulmonary Medicine, Nephrology and Critical Care Team and Palliative Care Team, formulating and executing care plan with placement of new orders, and documenting today's visit, findings and plan.

## 2024-06-12 NOTE — PROGRESS NOTE ADULT - ASSESSMENT
79M with PMH of Lung Ca s/p chemoradiation (last dose of both as per patient in April 2024) and HLD presents with SOB/Cough/Fevers and chills for 2-3 days. onset of cough and MCDONALD 2-3 days prior to admission and since then it has been progressively worsening. Started to have chills 1-2 days prior to admission and these symptoms remains persistent and getting worse. Denies syncope, chest pain, nausea, vomiting, abdominal pain/discomfort. Diarrhea reports (1 episode daily) and non-bloody. Cough productive with yellow and white sputum production. Denies having diagnosis of COPD. Former tobacco use quit many years ago.   In the ER Tmax 100.4F, -108, BP 14/83, RR 16-28, SpO2 85% on RA. WBC 9.49, Hgb 11.1, Na 134 otherwise CMP grossly unremarkable. Lactate 1.9, ProBNP 657, Troponin 116.63->149.07.   Left lower lobe nodular opacity likely known lung cancer. Left parahilar   radiation changes. Nonspecific diffuse groundglass throughout the right lung, differential   for which includes pneumonitis or infection. repeat ct chest 5/28 noted    Lung ca s/p chemo RT  suspected pneumonitis  no definite consolidation / pneumonia  adequate oxygenation but hypoxemia on exertion  No hemoptysis or discolored sputum  complete course of antibiotics  slow steroid taper  complete course of steroids and antibiotics  walk test for re eval hypoxemia on exertion  mobilize OOb and re eval oxygenation on exertion  ABG - ( 29 May 2024 14:51 )  pH, Arterial: 7.43  pH, Blood: x     /  pCO2: 36    /  pO2: 83    / HCO3: 24    / Base Excess: -0.1  /  SaO2: 98      Large A-a gradient despite antibiotics and steroids  etiology likley pneumonitis with persistent infiltrates and basilar crackles on exam  will need home o2 set up  switch to po ceftin /lower dose of cefepime IV  Doubt PE with negative CTA and alternate dx available with pneumonitis seen on ct scan  concern wit persistent infiltrates despite treatment  not a good candidate for lung bx  conservative measures appropriate  discussed with hospitalist  all recent films personally reviewed  data discussed with hospitalist  palliative care team noted  re eval for hypoxemia on exertion with walk test while using O2 4liters nc today pls    Dc planning delayed with worsened hyponatremia  repeat cxr today after diuresis noted  walk test while on O2 4 liters nc  Qualified for home O2    6/12  data discussed with pt;s DTR and RN staff at bedside  resp status deterioration addressed by repeat CTA and ABG all reviewed  overall no significant improvement clinically or radiographically despite course of antibiotics and steroids  increased A -a gradient requiring 50% Ventimask  ICU eval noted and no overt aspiration noted  failed to respond and prognosis remains guarded  Patient is DNR/DNI

## 2024-06-12 NOTE — PROGRESS NOTE ADULT - TREATMENT GUIDELINES
DNR/DNI
DNR/Comfort measures only/Do not re-hospitalize/No blood draws/No artificial nutrition/No IV fluids/DNI

## 2024-06-12 NOTE — PROGRESS NOTE ADULT - SUBJECTIVE AND OBJECTIVE BOX
Patient is a 79y old  Male who presents with a chief complaint of Cough/SOB/Fevers (24 May 2024 22:41)      HPI:  79M with PMH of Lung Ca s/p chemoradiation (last dose of both as per patient in April 2024) and HLD presents with SOB/Cough/Fevers and chills for 2-3 days. onset of cough and MCDONALD 2-3 days prior to admission and since then it has been progressively worsening. Started to have chills 1-2 days prior to admission and these symptoms remains persistent and getting worse. Denies syncope, chest pain, nausea, vomiting, abdominal pain/discomfort. Diarrhea reports (1 episode daily) and non-bloody. Cough productive with yellow and white sputum production. Denies having diagnosis of COPD. Former tobacco use quit many years ago.   In the ER Tmax 100.4F, -108, BP 14/83, RR 16-28, SpO2 85% on RA. WBC 9.49, Hgb 11.1, Na 134 otherwise CMP grossly unremarkable. Lactate 1.9, ProBNP 657, Troponin 116.63->149.07.   CTA- No pulmonary embolism through the level of the segmental pulmonary   arteries. Evaluation of some subsegmental pulmonary arteries is limited. Left lower lobe nodular opacity likely known lung cancer. Left parahilar   radiation changes. Nonspecific diffuse groundglass throughout the right lung, differential   for which includes pneumonitis or infection.    6/12  data discussed with pt;s DTR and RN staff at bedside  resp status deterioration addressed by repeat CTA and ABG all reviewed  overall no significant improvement clinically or radiographically despite course of antibiotics and steroids  increased A -a gradient requiring 50% Ventimask  ICU eval noted and no overt aspiration noted  failed to respond and prognosis remains guarded  family is aware and course reviewed with hospitalist caring for him as well      MEDICATIONS  (STANDING):  albuterol    0.083% 2.5 milliGRAM(s) Nebulizer every 6 hours  albuterol    90 MICROgram(s) HFA Inhaler 1 Puff(s) Inhalation every 4 hours  aspirin enteric coated 81 milliGRAM(s) Oral daily  atorvastatin 40 milliGRAM(s) Oral at bedtime  budesonide 160 MICROgram(s)/formoterol 4.5 MICROgram(s) Inhaler 2 Puff(s) Inhalation two times a day  clotrimazole Lozenge 1 Lozenge Oral <User Schedule>  demeclocycline 300 milliGRAM(s) Oral two times a day  dextrose 10% Bolus 125 milliLiter(s) IV Bolus once  dextrose 5%. 1000 milliLiter(s) (50 mL/Hr) IV Continuous <Continuous>  dextrose 5%. 1000 milliLiter(s) (100 mL/Hr) IV Continuous <Continuous>  dextrose 50% Injectable 25 Gram(s) IV Push once  dextrose 50% Injectable 12.5 Gram(s) IV Push once  enoxaparin Injectable 40 milliGRAM(s) SubCutaneous every 24 hours  glucagon  Injectable 1 milliGRAM(s) IntraMuscular once  insulin glargine Injectable (LANTUS) 5 Unit(s) SubCutaneous every morning  insulin lispro (ADMELOG) corrective regimen sliding scale   SubCutaneous three times a day before meals  insulin lispro (ADMELOG) corrective regimen sliding scale   SubCutaneous at bedtime  insulin lispro Injectable (ADMELOG) 2 Unit(s) SubCutaneous three times a day before meals  lisinopril 10 milliGRAM(s) Oral daily  methylPREDNISolone sodium succinate Injectable 40 milliGRAM(s) IV Push every 12 hours  metoprolol succinate ER 25 milliGRAM(s) Oral daily  multivitamin/minerals 1 Tablet(s) Oral daily  polyethylene glycol 3350 17 Gram(s) Oral daily  senna 2 Tablet(s) Oral at bedtime  sodium chloride 1 Gram(s) Oral three times a day  sodium chloride 1.5%. 500 milliLiter(s) (50 mL/Hr) IV Continuous <Continuous>  thiamine 100 milliGRAM(s) Oral daily  tiotropium 2.5 MICROgram(s) Inhaler 2 Puff(s) Inhalation daily    predniSONE   Tablet 40 milliGRAM(s) Oral daily  sodium chloride 1 Gram(s) Oral two times a day  tiotropium 2.5 MICROgram(s) Inhaler 2 Puff(s) Inhalation daily    MEDICATIONS  (PRN):  acetaminophen     Tablet .. 650 milliGRAM(s) Oral every 6 hours PRN Temp greater or equal to 38C (100.4F), Mild Pain (1 - 3)  aluminum hydroxide/magnesium hydroxide/simethicone Suspension 30 milliLiter(s) Oral every 4 hours PRN Dyspepsia  benzonatate 100 milliGRAM(s) Oral every 8 hours PRN Cough  dextrose Oral Gel 15 Gram(s) Oral once PRN Blood Glucose LESS THAN 70 milliGRAM(s)/deciliter  guaiFENesin Oral Liquid (Sugar-Free) 100 milliGRAM(s) Oral every 6 hours PRN Cough  melatonin 3 milliGRAM(s) Oral at bedtime PRN Insomnia  ondansetron Injectable 4 milliGRAM(s) IV Push every 8 hours PRN Nausea and/or Vomiting      Vital Signs Last 24 Hrs  T(C): 36.6 (12 Jun 2024 07:54), Max: 36.6 (12 Jun 2024 07:54)  T(F): 97.8 (12 Jun 2024 07:54), Max: 97.8 (12 Jun 2024 07:54)  HR: 89 (12 Jun 2024 07:54) (77 - 101)  BP: 134/63 (12 Jun 2024 07:54) (101/56 - 134/63)  BP(mean): --  RR: 20 (12 Jun 2024 07:54) (18 - 20)  SpO2: 99% (12 Jun 2024 07:54) (94% - 99%)    Parameters below as of 12 Jun 2024 07:54  Patient On (Oxygen Delivery Method): mask, nonrebreather      PHYSICAL EXAM  General Appearance: cooperative, mild resp distress, 50% venti mask  HEENT: PERRL, conjunctiva clear, EOM's intact, non injected pharynx, no exudate, TM   normal  Neck: Supple, , no adenopathy, thyroid: not enlarged, no carotid bruit or JVD  Lungs: bibasilar crackles persist, no wheeze  Heart: Regular rate and rhythm, S1, S2 normal, no murmur, rub or gallop  Abdomen: Soft, non-tender, bowel sounds active , no hepatosplenomegaly  Extremities: no cyanosis or edema, no joint swelling  Skin: Skin color, texture normal, no rashes   Neurologic: Alert and oriented X3 , cranial nerves intact, sensory and motor normal,    ECG:    LABS:06-10    123<L>  |  91<L>  |  15  ----------------------------<  83  4.0   |  27  |  0.28<L>    Ca    8.4<L>      10 Shaka 2024 06:52                            10.8   19.78 )-----------( 228      ( 06 Jun 2024 06:18 )             30.5   06-06    122<L>  |  91<L>  |  25<H>  ----------------------------<  105<H>  3.9   |  25  |  0.36<L>    Ca    7.7<L>      06 Jun 2024 06:18                            10.8   19.78 )-----------( 228      ( 06 Jun 2024 06:18 )             30.5   06-06    122<L>  |  91<L>  |  25<H>  ----------------------------<  105<H>  3.9   |  25  |  0.36<L>    Ca    7.7<L>      06 Jun 2024 06:18                            11.1   18.29 )-----------( 250      ( 04 Jun 2024 06:47 )             31.7   06-03    126<L>  |  93<L>  |  28<H>  ----------------------------<  170<H>  4.4   |  28  |  0.46<L>    Ca    8.7      03 Jun 2024 06:54                            9.4    6.29  )-----------( 236      ( 24 May 2024 06:41 )             27.7     05-24    135  |  103  |  12  ----------------------------<  242<H>  4.2   |  24  |  0.69    Ca    8.6      24 May 2024 06:41  Phos  3.4     05-24  Mg     1.8     05-24    TPro  6.3  /  Alb  2.1<L>  /  TBili  0.5  /  DBili  x   /  AST  18  /  ALT  26  /  AlkPhos  97  05-24              Urinalysis Basic - ( 24 May 2024 06:41 )    Color: x / Appearance: x / SG: x / pH: x  Gluc: 242 mg/dL / Ketone: x  / Bili: x / Urobili: x   Blood: x / Protein: x / Nitrite: x   Leuk Esterase: x / RBC: x / WBC x   Sq Epi: x / Non Sq Epi: x / Bacteria: x      ABG - ( 29 May 2024 14:51 )  pH, Arterial: 7.43  pH, Blood: x     /  pCO2: 36    /  pO2: 83    / HCO3: 24    / Base Excess: -0.1  /  SaO2: 98          < from: Xray Chest 1 View- PORTABLE-Routine (Xray Chest 1 View- PORTABLE-Routine .) (06.04.24 @ 08:56) >  PROCEDURE DATE:  06/04/2024          INTERPRETATION:  AP chest on June 4, 2024 at 8:46 AM. Patient is short of   breath.    Heart magnified by technique.    On May 27 there are perihilar infiltrates.    On present examination left-sided infiltrates are stable. There are   increasing right upper perihilar infiltrates.    IMPRESSION: Stable left-sided infiltrates. Increasing right upper   perihilar infiltrate.    < end of copied text >            RADIOLOGY & ADDITIONAL STUDIES:  < from: CT Angio Chest PE Protocol w/ IV Cont (05.23.24 @ 11:34) >  IMPRESSION:    No pulmonary embolism through the level of the segmental pulmonary   arteries. Evaluation of some subsegmental pulmonary arteries is limited.    Left lower lobe nodular opacity likely known lung cancer. Left parahilar   radiation changes.    Nonspecific diffuse groundglass throughout the right lung, differential   for which includes pneumonitis or infection.    < end of copied text >    Unchanged right greater than left groundglass opacities, which are   nonspecific, at least some of which may be related to radiation therapy.   Differential diagnostic considerations also but is not limited to include   superimposed inflammatory etiologies.    Unchanged nodular opacities including a dominant 2.6 cm left lower lobe   nodular opacity which may correspond to the patient's history of lung   cancer.    Comparison with prior older chest CTs are recommended for complete   evaluation of the above findings and if images are made available, an   addendum can be issued.    Minimal left pleural fluid with interval decrease in size since May 23,   2024.    < from: Xray Chest 1 View- PORTABLE-Routine (Xray Chest 1 View- PORTABLE-Routine in AM.) (06.07.24 @ 07:38) >    Frontal expiratory view of the chest shows the heart to be similar in   size. The lungs show slight clearing of interstitial markings in the   right lung and progression of interstitial markings in the left lung.   There is no evidence of pneumothorax nor definite pleural effusion.    IMPRESSION:  Changing markings.    < end of copied text >  < from: CT Angio Chest PE Protocol w/ IV Cont (06.10.24 @ 13:41) >  IMPRESSION:    No main, lobar or proximal segmental pulmonary embolus. The distal   segmental/subsegmental branches of the pulmonary arteries are not   evaluated secondary to motion.    In comparison with 5/28/2024, stable left perihilar nodular opacity.   Stable diffuse bilateral ground-glass opacities. New small left pleural   effusion.    < end of copied text >

## 2024-06-12 NOTE — PROGRESS NOTE ADULT - SUBJECTIVE AND OBJECTIVE BOX
Pt seen  On Oxygen NRB  family at bedside  not improving    MEDICATIONS  (STANDING):  albuterol    0.083% 2.5 milliGRAM(s) Nebulizer every 6 hours  albuterol    90 MICROgram(s) HFA Inhaler 1 Puff(s) Inhalation every 4 hours  MEDICATIONS  (STANDING):  albuterol    0.083% 2.5 milliGRAM(s) Nebulizer every 6 hours  albuterol    90 MICROgram(s) HFA Inhaler 1 Puff(s) Inhalation every 4 hours  aspirin enteric coated 81 milliGRAM(s) Oral daily  atorvastatin 40 milliGRAM(s) Oral at bedtime  budesonide 160 MICROgram(s)/formoterol 4.5 MICROgram(s) Inhaler 2 Puff(s) Inhalation two times a day  clotrimazole Lozenge 1 Lozenge Oral <User Schedule>  demeclocycline 300 milliGRAM(s) Oral two times a day  dextrose 10% Bolus 125 milliLiter(s) IV Bolus once  dextrose 5%. 1000 milliLiter(s) (50 mL/Hr) IV Continuous <Continuous>  dextrose 5%. 1000 milliLiter(s) (100 mL/Hr) IV Continuous <Continuous>  dextrose 50% Injectable 25 Gram(s) IV Push once  dextrose 50% Injectable 12.5 Gram(s) IV Push once  enoxaparin Injectable 40 milliGRAM(s) SubCutaneous every 24 hours  glucagon  Injectable 1 milliGRAM(s) IntraMuscular once  insulin glargine Injectable (LANTUS) 8 Unit(s) SubCutaneous at bedtime  insulin lispro (ADMELOG) corrective regimen sliding scale   SubCutaneous at bedtime  insulin lispro (ADMELOG) corrective regimen sliding scale   SubCutaneous three times a day before meals  insulin lispro Injectable (ADMELOG) 3 Unit(s) SubCutaneous three times a day before meals  lisinopril 10 milliGRAM(s) Oral daily  methylPREDNISolone sodium succinate Injectable 40 milliGRAM(s) IV Push every 12 hours  metoprolol succinate ER 25 milliGRAM(s) Oral daily  multivitamin/minerals 1 Tablet(s) Oral daily  polyethylene glycol 3350 17 Gram(s) Oral daily  senna 2 Tablet(s) Oral at bedtime  sodium chloride 1 Gram(s) Oral three times a day  thiamine 100 milliGRAM(s) Oral daily  tiotropium 2.5 MICROgram(s) Inhaler 2 Puff(s) Inhalation daily    MEDICATIONS  (PRN):  acetaminophen     Tablet .. 650 milliGRAM(s) Oral every 6 hours PRN Temp greater or equal to 38C (100.4F), Mild Pain (1 - 3)  aluminum hydroxide/magnesium hydroxide/simethicone Suspension 30 milliLiter(s) Oral every 4 hours PRN Dyspepsia  benzonatate 100 milliGRAM(s) Oral every 8 hours PRN Cough  dextrose Oral Gel 15 Gram(s) Oral once PRN Blood Glucose LESS THAN 70 milliGRAM(s)/deciliter  guaiFENesin Oral Liquid (Sugar-Free) 100 milliGRAM(s) Oral every 6 hours PRN Cough  melatonin 3 milliGRAM(s) Oral at bedtime PRN Insomnia  ondansetron Injectable 4 milliGRAM(s) IV Push every 8 hours PRN Nausea and/or Vomiting      ROS  No fever, sweats, chills       Vital Signs Last 24 Hrs  T(C): 36.4 (31 May 2024 07:51), Max: 36.4 (31 May 2024 07:51)  T(F): 97.5 (31 May 2024 07:51), Max: 97.5 (31 May 2024 07:51)  HR: 87 (31 May 2024 14:48) (74 - 88)  BP: 118/57 (31 May 2024 07:51) (101/57 - 118/57)  BP(mean): --  RR: 17 (31 May 2024 07:51) (17 - 18)  SpO2: 100% (31 May 2024 07:51) (97% - 100%)    Parameters below as of 31 May 2024 14:48  Patient On (Oxygen Delivery Method): nasal cannula, 4lpm        PE  on NRB oxygen  bilateral fine crackles in both lungs  appears tired                             10.9   18.49 )-----------( 196      ( 2024 07:11 )             31.9     06-12    127<L>  |  93<L>  |  28<H>  ----------------------------<  126<H>  4.5   |  28  |  0.35<L>    Ca    8.6      2024 07:11      ACC: [id number] EXAM:  CT ANGIO CHEST PULM ART [facility]IC   ORDERED BY: MATHEUS DEAN     PROCEDURE DATE:  06/10/2024          INTERPRETATION:  .  Patient: ASAEL ECHEVERRIA  : 1945  MRN: [id number]  ACC: [id number]  Order Date: 6/10/2024 1:41 PM  Exam: CT ANGIO CHEST PULMONARY ARTERY    INDICATION, CLINICAL INFORMATION: acute worsening of hypoxia, risk for PE   with advanced cancer  TECHNIQUE: CT pulmonary angiogram of the chest . Uneventful   administration of 70 cc Omnipaque 350. Coronal, sagittal and 3-D/MIP   images were reconstructed/reviewed.  COMPARISON: 2024 chest CT.    FINDINGS:    PULMONARY VESSELS: No main, lobar or proximal segmental pulmonary   embolus. The distal segmental/subsegmental branches of the pulmonary   arteries are not evaluated secondary to motion.    HEART AND VASCULATURE: Cardiomegaly. The right ventricle is markedly   dilated. No pericardial effusion. No aortic aneurysm or dissection.    LUNGS, AIRWAYS, PLEURA: Patent central airways. Emphysema. In comparison   with 2024, stable left perihilar nodular opacity. Stable diffuse   bilateral ground-glass opacities. New small left pleural effusion.    MEDIASTINUM: Status soft tissue thickening around the main bronchi and   subcarinal region.    UPPER ABDOMEN: Within normal limits.    BONES AND SOFT TISSUES: No aggressive osseous lesion.    LOWER NECK: Within normal limits.    IMPRESSION:    No main, lobar or proximal segmental pulmonary embolus. The distal   segmental/subsegmental branches of the pulmonary arteries are not   evaluated secondary to motion.    In comparison with 2024, stable left perihilar nodular opacity.   Stable diffuse bilateral ground-glass opacities. New small left pleural   effusion.    --- End of Report ---            MELANIA CARRANZA MD; Attending Radiologist

## 2024-06-12 NOTE — PROGRESS NOTE ADULT - SUBJECTIVE AND OBJECTIVE BOX
NEPHROLOGY INTERVAL HPI/OVERNIGHT EVENTS:    Date of Service: 24 @ 11:18    --Family at bedside.   NO acute distress.  MCDONALD with being cleaned.  FM in place.     family at bedside, doing well  6/10--Hypoxic this morning.  CTA done --negative for PE.  diffuse bilateral ground glass opacities and new small left pleural effusion.  Serum sodium remains low without significant response to 1.5 % NS yesterday.  Daughter at bedside reports pt eats fairly ok.      HPI:  79M with PMH of Lung Ca s/p chemoradiation (last dose of both as per patient in 2024) and HLD presents with SOB/Cough/Fevers and chills for 2-3 days. onset of cough and MCDONALD 2-3 days prior to admission and since then it has been progressively worsening. Started to have chills 1-2 days prior to admission and these symptoms remains persistent and getting worse. Denies syncope, chest pain, nausea, vomiting, abdominal pain/discomfort. Diarrhea reports (1 episode daily) and non-bloody. Cough productive with yellow and white sputum production. Denies having diagnosis of COPD. Former tobacco use quit many years ago.     In the ER Tmax 100.4F, -108, BP 14/83, RR 16-28, SpO2 85% on RA. WBC 9.49, Hgb 11.1, Na 134 otherwise CMP grossly unremarkable. Lactate 1.9, ProBNP 657, Troponin 116.63->149.07.     Nephrology  Pt w above hx as per chart  Renal consult requested for hyponatremia  Pt has CT/ CXR finding c/w GGO, pneumonitis/ infection  Was on diuretics now dc d due developing hyponatremia  Since dc d yesterday, Na improved 122 to 125  d/e Dr Perera      MEDICATIONS  (STANDING):  albuterol    0.083% 2.5 milliGRAM(s) Nebulizer every 6 hours  albuterol    90 MICROgram(s) HFA Inhaler 1 Puff(s) Inhalation every 4 hours  aspirin enteric coated 81 milliGRAM(s) Oral daily  atorvastatin 40 milliGRAM(s) Oral at bedtime  budesonide 160 MICROgram(s)/formoterol 4.5 MICROgram(s) Inhaler 2 Puff(s) Inhalation two times a day  clotrimazole Lozenge 1 Lozenge Oral <User Schedule>  demeclocycline 300 milliGRAM(s) Oral two times a day  dextrose 10% Bolus 125 milliLiter(s) IV Bolus once  dextrose 5%. 1000 milliLiter(s) (50 mL/Hr) IV Continuous <Continuous>  dextrose 5%. 1000 milliLiter(s) (100 mL/Hr) IV Continuous <Continuous>  dextrose 50% Injectable 25 Gram(s) IV Push once  dextrose 50% Injectable 12.5 Gram(s) IV Push once  enoxaparin Injectable 40 milliGRAM(s) SubCutaneous every 24 hours  glucagon  Injectable 1 milliGRAM(s) IntraMuscular once  insulin glargine Injectable (LANTUS) 8 Unit(s) SubCutaneous at bedtime  insulin lispro (ADMELOG) corrective regimen sliding scale   SubCutaneous three times a day before meals  insulin lispro (ADMELOG) corrective regimen sliding scale   SubCutaneous at bedtime  insulin lispro Injectable (ADMELOG) 3 Unit(s) SubCutaneous three times a day before meals  lisinopril 10 milliGRAM(s) Oral daily  methylPREDNISolone sodium succinate Injectable 40 milliGRAM(s) IV Push every 12 hours  metoprolol succinate ER 25 milliGRAM(s) Oral daily  multivitamin/minerals 1 Tablet(s) Oral daily  polyethylene glycol 3350 17 Gram(s) Oral daily  senna 2 Tablet(s) Oral at bedtime  sodium chloride 1 Gram(s) Oral three times a day  thiamine 100 milliGRAM(s) Oral daily  tiotropium 2.5 MICROgram(s) Inhaler 2 Puff(s) Inhalation daily    MEDICATIONS  (PRN):  acetaminophen     Tablet .. 650 milliGRAM(s) Oral every 6 hours PRN Temp greater or equal to 38C (100.4F), Mild Pain (1 - 3)  aluminum hydroxide/magnesium hydroxide/simethicone Suspension 30 milliLiter(s) Oral every 4 hours PRN Dyspepsia  benzonatate 100 milliGRAM(s) Oral every 8 hours PRN Cough  dextrose Oral Gel 15 Gram(s) Oral once PRN Blood Glucose LESS THAN 70 milliGRAM(s)/deciliter  guaiFENesin Oral Liquid (Sugar-Free) 100 milliGRAM(s) Oral every 6 hours PRN Cough  melatonin 3 milliGRAM(s) Oral at bedtime PRN Insomnia  ondansetron Injectable 4 milliGRAM(s) IV Push every 8 hours PRN Nausea and/or Vomiting    Vital Signs Last 24 Hrs  T(C): 36.6 (2024 07:54), Max: 36.6 (2024 07:54)  T(F): 97.8 (:54), Max: 97.8 (2024 07:54)  HR: 89 (:54) (77 - 89)  BP: 134/63 (:54) (101/56 - 134/63)  BP(mean): --  RR: 20 (:54) (18 - 20)  SpO2: 99% (:54) (94% - 99%)    Parameters below as of :54  Patient On (Oxygen Delivery Method): mask, nonrebreather      Daily     Daily Weight in k.4 (2024 20:13)    -11 @ 07:01  -  06-12 @ 07:00  --------------------------------------------------------  IN: 250 mL / OUT: 150 mL / NET: 100 mL    PHYSICAL EXAM:  GENERAL: no acute distress  CHEST/LUNG: scattered rhonchi  HEART: S1S2 RRR  ABDOMEN: soft  EXTREMITIES: no edema  SKIN:     LABS:                        10.9   18.49 )-----------( 196      ( 2024 07:11 )             31.9     06-12    127<L>  |  93<L>  |  28<H>  ----------------------------<  126<H>  4.5   |  28  |  0.35<L>    Ca    8.6      2024 07:11        Urinalysis Basic - ( 2024 07:11 )    Color: x / Appearance: x / SG: x / pH: x  Gluc: 126 mg/dL / Ketone: x  / Bili: x / Urobili: x   Blood: x / Protein: x / Nitrite: x   Leuk Esterase: x / RBC: x / WBC x   Sq Epi: x / Non Sq Epi: x / Bacteria: x        ABG - ( 10 Shaka 2024 18:26 )  pH, Arterial: 7.47  pH, Blood: x     /  pCO2: 35    /  pO2: 68    / HCO3: 26    / Base Excess: 2.1   /  SaO2: 95                    RADIOLOGY & ADDITIONAL TESTS:

## 2024-06-12 NOTE — PROGRESS NOTE ADULT - ASSESSMENT
79M with PMH of Lung Ca s/p chemoradiation (last dose of both as per patient in April 2024) and HLD presents with SOB/Cough/Fevers and chills for 2-3 days. onset of cough and MCDONALD 2-3 days prior to admission and since then it has been progressively worsening. Started to have chills 1-2 days prior to admission and these symptoms remains persistent and getting worse. Denies syncope, chest pain, nausea, vomiting, abdominal pain/discomfort. Diarrhea reports (1 episode daily) and non-bloody. Cough productive with yellow and white sputum production. Denies having diagnosis of COPD. Former tobacco use quit many years ago.     In the ER Tmax 100.4F, -108, BP 14/83, RR 16-28, SpO2 85% on RA. WBC 9.49, Hgb 11.1, Na 134 otherwise CMP grossly unremarkable. Lactate 1.9, ProBNP 657, Troponin 116.63->149.07.     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    Repeat urine studies to confirm.    With poor resp status and new left pleural effusion, hold further hypertonic fluid,    Trial of NACL tabs and Demeclocycline for now.    6/11 MK  - hyponatremia / SIADH with lung ca     cw nacl tabs and demeclocycline   dw family at bedside     6/12 SY  --Hyponatremia c/w SIADH : slowly trending up     Continue NACL/ Demeclocycline.  --Resp : bilateral ground glass inf : diuretics PRN if indicated.    Pulm tx and steroid. --poor prognosis.

## 2024-06-12 NOTE — CHART NOTE - NSCHARTNOTEFT_GEN_A_CORE
HOME O2 EVALUATION    Pulse Ox Room Air Rest: 86%    Pulse Ox Room Air Ambulating: pt not ambulated on room air    Pulse Ox Post Ambulation: 95% on 5lpm rest
Goals of care discussion completed with Palliative MD, Dr. Lema; see note. Family has decided on home hospice & unit SW [facility] to place referral. Palliative SW available if needed. Our team will continue to follow.

## 2024-06-13 NOTE — PROGRESS NOTE ADULT - PROVIDER SPECIALTY LIST ADULT
Hospitalist
Nephrology
Nephrology
Pulmonology
Pulmonology
Heme/Onc
Hospitalist
Nephrology
Nephrology
Palliative Care
Pulmonology
Heme/Onc
Hospitalist
Hospitalist
Pulmonology
Pulmonology
Hospitalist
Nephrology
Pulmonology
Hospitalist
Palliative Care
Palliative Care

## 2024-06-13 NOTE — PROGRESS NOTE ADULT - ASSESSMENT
79M with PMH of Lung Ca s/p chemoradiation (last dose of both as per patient in April 2024) and HLD presents with SOB/Cough/Fevers and chills for 2-3 days. onset of cough and MCDONALD 2-3 days prior to admission and since then it has been progressively worsening. Started to have chills 1-2 days prior to admission and these symptoms remains persistent and getting worse. Denies syncope, chest pain, nausea, vomiting, abdominal pain/discomfort. Diarrhea reports (1 episode daily) and non-bloody. Cough productive with yellow and white sputum production. Denies having diagnosis of COPD. Former tobacco use quit many years ago.   In the ER Tmax 100.4F, -108, BP 14/83, RR 16-28, SpO2 85% on RA. WBC 9.49, Hgb 11.1, Na 134 otherwise CMP grossly unremarkable. Lactate 1.9, ProBNP 657, Troponin 116.63->149.07.   Left lower lobe nodular opacity likely known lung cancer. Left parahilar   radiation changes. Nonspecific diffuse groundglass throughout the right lung, differential   for which includes pneumonitis or infection. repeat ct chest 5/28 noted    Lung ca s/p chemo RT  suspected pneumonitis  no definite consolidation / pneumonia  adequate oxygenation but hypoxemia on exertion  No hemoptysis or discolored sputum  complete course of antibiotics  slow steroid taper  complete course of steroids and antibiotics  walk test for re eval hypoxemia on exertion  mobilize OOb and re eval oxygenation on exertion  ABG - ( 29 May 2024 14:51 )  pH, Arterial: 7.43  pH, Blood: x     /  pCO2: 36    /  pO2: 83    / HCO3: 24    / Base Excess: -0.1  /  SaO2: 98      Large A-a gradient despite antibiotics and steroids  etiology likley pneumonitis with persistent infiltrates and basilar crackles on exam  will need home o2 set up  switch to po ceftin /lower dose of cefepime IV  Doubt PE with negative CTA and alternate dx available with pneumonitis seen on ct scan  concern wit persistent infiltrates despite treatment  not a good candidate for lung bx  conservative measures appropriate  discussed with hospitalist  all recent films personally reviewed  data discussed with hospitalist  palliative care team noted  re eval for hypoxemia on exertion with walk test while using O2 4liters nc today pls    Dc planning delayed with worsened hyponatremia  repeat cxr today after diuresis noted  walk test while on O2 4 liters nc  Qualified for home O2    6/13  resp status deterioration addressed by repeat CTA and ABG all reviewed  overall no significant improvement clinically or radiographically despite course of antibiotics and steroids  ICU eval noted and no overt aspiration noted  family is aware and course reviewed with hospitalist caring for him as well  Treated for ILD but now has new pleural effusion and BNP has increased   failed to respond and prognosis remains guarded  Since BNP is elevated, will repeat Echo, ILD related pleural effusions are rare so this is likely cardiogenic  Will discuss further with hospitalist  discussed with Onc  Patient is DNR/DNI

## 2024-06-13 NOTE — PROGRESS NOTE ADULT - REASON FOR ADMISSION
Acute respiratory failure with hypoxia  Nonspecific R lung ground glass opacities, possible pneumonitis
Cough/SOB/Fevers
Acute respiratory failure with hypoxia  Nonspecific R lung ground glass opacities, possible pneumonitis
Acute respiratory failure with hypoxia  Nonspecific R lung ground glass opacities, possible pneumonitis
Cough/SOB/Fevers

## 2024-06-13 NOTE — PROGRESS NOTE ADULT - NUTRITIONAL ASSESSMENT
This patient has been assessed with a concern for Malnutrition and has been determined to have a diagnosis/diagnoses of Severe protein-calorie malnutrition.    This patient is being managed with:   Diet Consistent Carbohydrate w/Evening Snack-  Entered: May 24 2024  8:29AM  
This patient has been assessed with a concern for Malnutrition and has been determined to have a diagnosis/diagnoses of Severe protein-calorie malnutrition.    This patient is being managed with:   Diet Consistent Carbohydrate w/Evening Snack-  Supplement Feeding Modality:  Oral  Ensure Plus High Protein Cans or Servings Per Day:  1       Frequency:  Three Times a day  Entered: Jun 9 2024  7:48PM  
This patient has been assessed with a concern for Malnutrition and has been determined to have a diagnosis/diagnoses of Severe protein-calorie malnutrition.    This patient is being managed with:   Diet Consistent Carbohydrate w/Evening Snack-  Entered: May 24 2024  8:29AM  
This patient has been assessed with a concern for Malnutrition and has been determined to have a diagnosis/diagnoses of Severe protein-calorie malnutrition.    This patient is being managed with:   Diet Consistent Carbohydrate w/Evening Snack-  Supplement Feeding Modality:  Oral  Ensure Plus High Protein Cans or Servings Per Day:  1       Frequency:  Three Times a day  Entered: Jun 9 2024  7:48PM  
This patient has been assessed with a concern for Malnutrition and has been determined to have a diagnosis/diagnoses of Severe protein-calorie malnutrition.    This patient is being managed with:   Diet Consistent Carbohydrate w/Evening Snack-  Supplement Feeding Modality:  Oral  Ensure Plus High Protein Cans or Servings Per Day:  1       Frequency:  Three Times a day  Entered: Jun 9 2024  7:48PM  
This patient has been assessed with a concern for Malnutrition and has been determined to have a diagnosis/diagnoses of Severe protein-calorie malnutrition.    This patient is being managed with:   Diet Consistent Carbohydrate w/Evening Snack-  Entered: May 24 2024  8:29AM  
This patient has been assessed with a concern for Malnutrition and has been determined to have a diagnosis/diagnoses of Severe protein-calorie malnutrition.    This patient is being managed with:   Diet Consistent Carbohydrate w/Evening Snack-  Supplement Feeding Modality:  Oral  Ensure Plus High Protein Cans or Servings Per Day:  1       Frequency:  Three Times a day  Entered: Jun 9 2024  7:48PM

## 2024-06-13 NOTE — PROGRESS NOTE ADULT - ASSESSMENT
79 year-old man with HLD, hx of lung cancer s/p chemoradiation, last dose of both as per patient was in April 2024, p/w dyspnea, cough, fever, chills for 2-3 days, progressively worsened. Cough was productive of whitish yellow sputum. In the ED Tm 100.4. HR 100s. RR up to 28. O2 85% on room air. WBC 9.49. Hgb 11.1. Na 134 otherwise CMP grossly unremarkable. Lactate 1.9, ProBNP 657, Troponin 116.63->149.07. CTA with no pulmonary embolism through the level of the segmental pulmonary arteries. Evaluation of some subsegmental pulmonary arteries was limited. Left lower lobe nodular opacity likely known lung cancer. Left parahilar radiation changes. Nonspecific diffuse groundglass throughout the right lung, differential for which included pneumonitis or infection. Admitted to Medicine.    Acute hypoxic respiratory failure  Diagnosed with pneumonia upon admission. Had some subjective improvement with course of broad spectrum antibiotics but continued to require supplemental O2. Patient was seen and evaluated by Pulmonary Medicine who suspect he may have pneumonitis. He was treated with systemic steroids and improved a bit further albeit still O2-dependent. He qualified for home O2 and was approaching stability for DC home with home O2 pending improvement in hyponatremia (see below) when on 06/10/22 he was noted to be developing increasing dyspnea and increasing O2 requirement. CTA chest. No main, lobar or proximal segmental pulmonary embolus. The distal segmental/subsegmental branches of the pulmonary arteries were not evaluated secondary to motion. In comparison with 5/28/2024, stable left perihilar nodular opacity. Stable diffuse bilateral ground-glass opacities. New small left pleural effusion. S/P IV Lasix x 1. Increased prednisone back up to methylprednisolone 40mg q12, continued Symbicort and albuterol. Clinically unchanged today. Now on 50% ventimask with SPO2 low-mid 90s.  - See and evaluated: currently on comfort measures only.   - Continue steroids, bronchodilators  - O2 supplementation, wean as tolerated    Severe sepsis due to pneumonia, unable to rule out Gram-negative organism  Has underlying lung cancer s/p chemoradiation treatment, last treatment 4/2024 per patient. Cultures negative to date. Completed course of cefepime and doxycycline.  - Continue to monitor    Elevated troponin  Likely myocardial demand ischemia without infarction. 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 Nephrology evaluation noted, NaCl tabs started BID, still  Na not improved. Likely worsening by poor osmolar intake. Did not improve with Trial of 1.5% of HTS.   - Demeclocycline as per Nephrology  - Increase po intake   - Fluid restriction, limit free water intake      Prediabetes with hyperglycemia due to steroids  A1c 6.4% but glucose uncontrolled in setting of system corticosteroids  -  Lantus and Lispro   - Continue to monitor glucose     Severe protein calorie malnutrition  Appreciate dietician input  - Add thiamine and MVI daily  - Ensure Plus High Protein TID      DVT px: LMWH  Code status: DNR, DNI, comfort measures only   Dispo: home hospice vs inpatient hospice          Commode  3:1 commode;  pt will require a 3:1 commode as pt incapable of ambulating to bathroom as patient is confined to a single room without a bathroom.    Transport Wheelchair  This patient has a mobility limitation that significantly impairs the patients ability to participate in one or more MRADL's such as: toileting, eating, dressing and bathing in customary locations in the home. Patient's home provides adequate access between rooms for the use of the manual wheelchair. The wheelchair will significantly improve patient's ability to participate in MRADL's and will be used on a regular basis in the home. The patient's mobility limitation cannot be resolved by the use of a walker or cane. This patient does not have the upper extremity function to safely propel the manual wheelchair. Patient has a 24/7 care giver in the home who is willing to propel the wheelchair at any given time. The patient has agreed to use the wheelchair on a daily basis in the home. Diagnosis: Lung Cancer, Pneumonitis, weakness, FTT, COPD           52 minutes spent on total encounter. The necessity of the time spent during the encounter on this date of service was due to reviewing chart notes and data, seeing and evaluating patient, coordinating care including discussing case with consultants from Pulmonary Medicine, Nephrology and Critical Care Team and Palliative Care Team, formulating and executing care plan with placement of new orders, and documenting today's visit, findings and plan.

## 2024-06-13 NOTE — PROGRESS NOTE ADULT - SUBJECTIVE AND OBJECTIVE BOX
Chief Complaint: Fever, dyspnea, cough    Interval Hx: Pt seen and evaluated. No acute complaints. On NRB     ROS: Multi system review is comprehensively negative x 10 systems except as above    Vitals:  Vital Signs Last 24 Hrs  T(C): 36.6 (13 Jun 2024 08:10), Max: 36.6 (13 Jun 2024 08:10)  T(F): 97.8 (13 Jun 2024 08:10), Max: 97.8 (13 Jun 2024 08:10)  HR: 85 (13 Jun 2024 08:10) (85 - 85)  BP: 109/66 (13 Jun 2024 08:10) (109/66 - 109/66)  BP(mean): 3 (13 Jun 2024 08:10) (3 - 3)  RR: 18 (13 Jun 2024 08:10) (18 - 18)  SpO2: 93% (13 Jun 2024 08:10) (93% - 93%)    Parameters below as of 13 Jun 2024 08:10  Patient On (Oxygen Delivery Method): mask, nonrebreather  O2 Flow (L/min): 15        Exam:  Gen: No acute distress  HEENT: PERRL, conjunctiva clear, EOM's intact, non injected pharynx, no exudates  Neck: Supple, no adenopathy, thyroid not enlarged, no carotid bruit or JVD  Back: No tenderness  Lungs: Normal resp effort at rest, bibasilar crackles persist, no wheeze  Heart: S1, S2 normal, RRR  Abdomen: Soft, non-tender, bowel sounds active  Extremities: No edema, tenderness, no synovitis  Skin: Normal skin color, no rashes   Neurologic: Awake and alert, answers simple questions, follows commands    Labs:                        10.9   18.49 )-----------( 196      ( 12 Jun 2024 07:11 )             31.9   06-12    127<L>  |  93<L>  |  28<H>  ----------------------------<  126<H>  4.5   |  28  |  0.35<L>    Ca    8.6      12 Jun 2024 07:11                                Imaging:  CTA chest PE protocol W/ IV cont 6/10: No main, lobar or proximal segmental pulmonary embolus. The distal segmental/subsegmental branches of the pulmonary arteries were not evaluated secondary to motion. In comparison with 5/28/2024, stable left perihilar nodular opacity. Stable diffuse bilateral ground-glass opacities. New small left pleural effusion.     CXR 6/7: Frontal expiratory view of the chest shows the heart to be similar in size. The lungs show slight clearing of interstitial markings in the right lung and progression of interstitial markings in the left lung. There is no evidence of pneumothorax nor definite pleural effusion.    CXR 6/4: Heart magnified by technique. On May 27 there are perihilar infiltrates. On present examination left-sided infiltrates are stable. There are increasing right upper perihilar infiltrates.    CT chest WO 5/28: As compared to CT chest from 5/23, unchanged right greater than left groundglass opacities, which are nonspecific, at least some of which may be related to radiation therapy. Differential diagnostic considerations also but is not limited to include superimposed inflammatory etiologies. Unchanged nodular opacities including a dominant 2.6 cm left lower lobe nodular opacity which may correspond to the patient's history of lung cancer. Minimal left pleural fluid with interval decrease in size since May 23, 2024.    CTA chest W/ IV cont 5/23: No pulmonary embolism through the level of the segmental pulmonary arteries. Evaluation of some subsegmental pulmonary arteries is limited. Left lower lobe nodular opacity likely known lung cancer. Left parahilar radiation changes. Nonspecific diffuse groundglass throughout the right lung, differential for which includes pneumonitis or infection.    Cardiac Testing:  TTE 5/24: Left ventricular cavity is normal in size. Left ventricular systolic function is normal with an ejection fraction of 52 % by 3D with an ejection fraction visually estimated at 50 to 55 %. Normal right ventricular cavity size, with normal wall thickness, and normal systolic function. Mild mitral regurgitation. Mild aortic regurgitation.    EKG 5/23: Normal sinus rhythm. Normal ECG    Meds:  MEDICATIONS  (STANDING):  albuterol    0.083% 2.5 milliGRAM(s) Nebulizer every 6 hours  albuterol    90 MICROgram(s) HFA Inhaler 1 Puff(s) Inhalation every 4 hours  aspirin enteric coated 81 milliGRAM(s) Oral daily  atorvastatin 40 milliGRAM(s) Oral at bedtime  budesonide 160 MICROgram(s)/formoterol 4.5 MICROgram(s) Inhaler 2 Puff(s) Inhalation two times a day  clotrimazole Lozenge 1 Lozenge Oral <User Schedule>  demeclocycline 300 milliGRAM(s) Oral two times a day  dextrose 10% Bolus 125 milliLiter(s) IV Bolus once  dextrose 5%. 1000 milliLiter(s) (50 mL/Hr) IV Continuous <Continuous>  dextrose 5%. 1000 milliLiter(s) (100 mL/Hr) IV Continuous <Continuous>  dextrose 50% Injectable 25 Gram(s) IV Push once  dextrose 50% Injectable 12.5 Gram(s) IV Push once  enoxaparin Injectable 40 milliGRAM(s) SubCutaneous every 24 hours  glucagon  Injectable 1 milliGRAM(s) IntraMuscular once  insulin glargine Injectable (LANTUS) 8 Unit(s) SubCutaneous at bedtime  insulin lispro (ADMELOG) corrective regimen sliding scale   SubCutaneous three times a day before meals  insulin lispro (ADMELOG) corrective regimen sliding scale   SubCutaneous at bedtime  insulin lispro Injectable (ADMELOG) 3 Unit(s) SubCutaneous three times a day before meals  lisinopril 10 milliGRAM(s) Oral daily  methylPREDNISolone sodium succinate Injectable 40 milliGRAM(s) IV Push every 12 hours  metoprolol succinate ER 25 milliGRAM(s) Oral daily  multivitamin/minerals 1 Tablet(s) Oral daily  polyethylene glycol 3350 17 Gram(s) Oral daily  senna 2 Tablet(s) Oral at bedtime  sodium chloride 1 Gram(s) Oral three times a day  thiamine 100 milliGRAM(s) Oral daily  tiotropium 2.5 MICROgram(s) Inhaler 2 Puff(s) Inhalation daily    MEDICATIONS  (PRN):  acetaminophen     Tablet .. 650 milliGRAM(s) Oral every 6 hours PRN Temp greater or equal to 38C (100.4F), Mild Pain (1 - 3)  aluminum hydroxide/magnesium hydroxide/simethicone Suspension 30 milliLiter(s) Oral every 4 hours PRN Dyspepsia  benzonatate 100 milliGRAM(s) Oral every 8 hours PRN Cough  dextrose Oral Gel 15 Gram(s) Oral once PRN Blood Glucose LESS THAN 70 milliGRAM(s)/deciliter  guaiFENesin Oral Liquid (Sugar-Free) 100 milliGRAM(s) Oral every 6 hours PRN Cough  melatonin 3 milliGRAM(s) Oral at bedtime PRN Insomnia  ondansetron Injectable 4 milliGRAM(s) IV Push every 8 hours PRN Nausea and/or Vomiting

## 2024-06-13 NOTE — PROGRESS NOTE ADULT - SUBJECTIVE AND OBJECTIVE BOX
Patient is a 79y old  Male who presents with a chief complaint of Cough/SOB/Fevers (24 May 2024 22:41)      HPI:  79M with PMH of Lung Ca s/p chemoradiation (last dose of both as per patient in April 2024) and HLD presents with SOB/Cough/Fevers and chills for 2-3 days. onset of cough and MCDONALD 2-3 days prior to admission and since then it has been progressively worsening. Started to have chills 1-2 days prior to admission and these symptoms remains persistent and getting worse. Denies syncope, chest pain, nausea, vomiting, abdominal pain/discomfort. Diarrhea reports (1 episode daily) and non-bloody. Cough productive with yellow and white sputum production. Denies having diagnosis of COPD. Former tobacco use quit many years ago.   In the ER Tmax 100.4F, -108, BP 14/83, RR 16-28, SpO2 85% on RA. WBC 9.49, Hgb 11.1, Na 134 otherwise CMP grossly unremarkable. Lactate 1.9, ProBNP 657, Troponin 116.63->149.07.   CTA- No pulmonary embolism through the level of the segmental pulmonary   arteries. Evaluation of some subsegmental pulmonary arteries is limited. Left lower lobe nodular opacity likely known lung cancer. Left parahilar   radiation changes. Nonspecific diffuse groundglass throughout the right lung, differential   for which includes pneumonitis or infection.    6/13  resp status deterioration addressed by repeat CTA and ABG all reviewed  overall no significant improvement clinically or radiographically despite course of antibiotics and steroids  increased A -a gradient requiring 50% Ventimask  ICU eval noted and no overt aspiration noted  failed to respond and prognosis remains guarded  family is aware and course reviewed with hospitalist caring for him as well  Treated for ILD but now has new pleural effusion and BNP has increased     MEDICATIONS  (STANDING):  albuterol    0.083% 2.5 milliGRAM(s) Nebulizer every 6 hours  albuterol    90 MICROgram(s) HFA Inhaler 1 Puff(s) Inhalation every 4 hours  aspirin enteric coated 81 milliGRAM(s) Oral daily  atorvastatin 40 milliGRAM(s) Oral at bedtime  budesonide 160 MICROgram(s)/formoterol 4.5 MICROgram(s) Inhaler 2 Puff(s) Inhalation two times a day  clotrimazole Lozenge 1 Lozenge Oral <User Schedule>  demeclocycline 300 milliGRAM(s) Oral two times a day  dextrose 10% Bolus 125 milliLiter(s) IV Bolus once  dextrose 5%. 1000 milliLiter(s) (50 mL/Hr) IV Continuous <Continuous>  dextrose 5%. 1000 milliLiter(s) (100 mL/Hr) IV Continuous <Continuous>  dextrose 50% Injectable 25 Gram(s) IV Push once  dextrose 50% Injectable 12.5 Gram(s) IV Push once  enoxaparin Injectable 40 milliGRAM(s) SubCutaneous every 24 hours  glucagon  Injectable 1 milliGRAM(s) IntraMuscular once  insulin glargine Injectable (LANTUS) 5 Unit(s) SubCutaneous every morning  insulin lispro (ADMELOG) corrective regimen sliding scale   SubCutaneous three times a day before meals  insulin lispro (ADMELOG) corrective regimen sliding scale   SubCutaneous at bedtime  insulin lispro Injectable (ADMELOG) 2 Unit(s) SubCutaneous three times a day before meals  lisinopril 10 milliGRAM(s) Oral daily  methylPREDNISolone sodium succinate Injectable 40 milliGRAM(s) IV Push every 12 hours  metoprolol succinate ER 25 milliGRAM(s) Oral daily  multivitamin/minerals 1 Tablet(s) Oral daily  polyethylene glycol 3350 17 Gram(s) Oral daily  senna 2 Tablet(s) Oral at bedtime  sodium chloride 1 Gram(s) Oral three times a day  sodium chloride 1.5%. 500 milliLiter(s) (50 mL/Hr) IV Continuous <Continuous>  thiamine 100 milliGRAM(s) Oral daily  tiotropium 2.5 MICROgram(s) Inhaler 2 Puff(s) Inhalation daily    predniSONE   Tablet 40 milliGRAM(s) Oral daily  sodium chloride 1 Gram(s) Oral two times a day  tiotropium 2.5 MICROgram(s) Inhaler 2 Puff(s) Inhalation daily    MEDICATIONS  (PRN):  acetaminophen     Tablet .. 650 milliGRAM(s) Oral every 6 hours PRN Temp greater or equal to 38C (100.4F), Mild Pain (1 - 3)  aluminum hydroxide/magnesium hydroxide/simethicone Suspension 30 milliLiter(s) Oral every 4 hours PRN Dyspepsia  benzonatate 100 milliGRAM(s) Oral every 8 hours PRN Cough  dextrose Oral Gel 15 Gram(s) Oral once PRN Blood Glucose LESS THAN 70 milliGRAM(s)/deciliter  guaiFENesin Oral Liquid (Sugar-Free) 100 milliGRAM(s) Oral every 6 hours PRN Cough  melatonin 3 milliGRAM(s) Oral at bedtime PRN Insomnia  ondansetron Injectable 4 milliGRAM(s) IV Push every 8 hours PRN Nausea and/or Vomiting      Vital Signs Last 24 Hrs  T(C): 36.6 (12 Jun 2024 07:54), Max: 36.6 (12 Jun 2024 07:54)  T(F): 97.8 (12 Jun 2024 07:54), Max: 97.8 (12 Jun 2024 07:54)  HR: 89 (12 Jun 2024 20:15) (89 - 89)  BP: 134/63 (12 Jun 2024 07:54) (134/63 - 134/63)  BP(mean): --  RR: 20 (12 Jun 2024 07:54) (20 - 20)  SpO2: 98% (12 Jun 2024 20:15) (98% - 99%)    Parameters below as of 12 Jun 2024 20:15  Patient On (Oxygen Delivery Method): mask, nonrebreather    PHYSICAL EXAM  General Appearance: cooperative, mild resp distress, 50% venti mask  HEENT: PERRL, conjunctiva clear, EOM's intact, non injected pharynx, no exudate, TM   normal  Neck: Supple, , no adenopathy, thyroid: not enlarged, no carotid bruit or JVD  Lungs: bibasilar crackles persist, no wheeze  Heart: Regular rate and rhythm, S1, S2 normal, no murmur, rub or gallop  Abdomen: Soft, non-tender, bowel sounds active , no hepatosplenomegaly  Extremities: no cyanosis or edema, no joint swelling  Skin: Skin color, texture normal, no rashes   Neurologic: Alert and oriented X3 , cranial nerves intact, sensory and motor normal,    ECG:    LABS:06-10    123<L>  |  91<L>  |  15  ----------------------------<  83  4.0   |  27  |  0.28<L>    Ca    8.4<L>      10 Shaka 2024 06:52                            10.8   19.78 )-----------( 228      ( 06 Jun 2024 06:18 )             30.5   06-06    122<L>  |  91<L>  |  25<H>  ----------------------------<  105<H>  3.9   |  25  |  0.36<L>    Ca    7.7<L>      06 Jun 2024 06:18                            10.8   19.78 )-----------( 228      ( 06 Jun 2024 06:18 )             30.5   06-06    122<L>  |  91<L>  |  25<H>  ----------------------------<  105<H>  3.9   |  25  |  0.36<L>    Ca    7.7<L>      06 Jun 2024 06:18                            11.1   18.29 )-----------( 250      ( 04 Jun 2024 06:47 )             31.7   06-03    126<L>  |  93<L>  |  28<H>  ----------------------------<  170<H>  4.4   |  28  |  0.46<L>    Ca    8.7      03 Jun 2024 06:54                            9.4    6.29  )-----------( 236      ( 24 May 2024 06:41 )             27.7     05-24    135  |  103  |  12  ----------------------------<  242<H>  4.2   |  24  |  0.69    Ca    8.6      24 May 2024 06:41  Phos  3.4     05-24  Mg     1.8     05-24    TPro  6.3  /  Alb  2.1<L>  /  TBili  0.5  /  DBili  x   /  AST  18  /  ALT  26  /  AlkPhos  97  05-24              Urinalysis Basic - ( 24 May 2024 06:41 )    Color: x / Appearance: x / SG: x / pH: x  Gluc: 242 mg/dL / Ketone: x  / Bili: x / Urobili: x   Blood: x / Protein: x / Nitrite: x   Leuk Esterase: x / RBC: x / WBC x   Sq Epi: x / Non Sq Epi: x / Bacteria: x      ABG - ( 29 May 2024 14:51 )  pH, Arterial: 7.43  pH, Blood: x     /  pCO2: 36    /  pO2: 83    / HCO3: 24    / Base Excess: -0.1  /  SaO2: 98          < from: Xray Chest 1 View- PORTABLE-Routine (Xray Chest 1 View- PORTABLE-Routine .) (06.04.24 @ 08:56) >  PROCEDURE DATE:  06/04/2024          INTERPRETATION:  AP chest on June 4, 2024 at 8:46 AM. Patient is short of   breath.    Heart magnified by technique.    On May 27 there are perihilar infiltrates.    On present examination left-sided infiltrates are stable. There are   increasing right upper perihilar infiltrates.    IMPRESSION: Stable left-sided infiltrates. Increasing right upper   perihilar infiltrate.    < end of copied text >            RADIOLOGY & ADDITIONAL STUDIES:  < from: CT Angio Chest PE Protocol w/ IV Cont (05.23.24 @ 11:34) >  IMPRESSION:    No pulmonary embolism through the level of the segmental pulmonary   arteries. Evaluation of some subsegmental pulmonary arteries is limited.    Left lower lobe nodular opacity likely known lung cancer. Left parahilar   radiation changes.    Nonspecific diffuse groundglass throughout the right lung, differential   for which includes pneumonitis or infection.    < end of copied text >    Unchanged right greater than left groundglass opacities, which are   nonspecific, at least some of which may be related to radiation therapy.   Differential diagnostic considerations also but is not limited to include   superimposed inflammatory etiologies.    Unchanged nodular opacities including a dominant 2.6 cm left lower lobe   nodular opacity which may correspond to the patient's history of lung   cancer.    Comparison with prior older chest CTs are recommended for complete   evaluation of the above findings and if images are made available, an   addendum can be issued.    Minimal left pleural fluid with interval decrease in size since May 23,   2024.    < from: Xray Chest 1 View- PORTABLE-Routine (Xray Chest 1 View- PORTABLE-Routine in AM.) (06.07.24 @ 07:38) >    Frontal expiratory view of the chest shows the heart to be similar in   size. The lungs show slight clearing of interstitial markings in the   right lung and progression of interstitial markings in the left lung.   There is no evidence of pneumothorax nor definite pleural effusion.    IMPRESSION:  Changing markings.    < end of copied text >  < from: CT Angio Chest PE Protocol w/ IV Cont (06.10.24 @ 13:41) >  IMPRESSION:    No main, lobar or proximal segmental pulmonary embolus. The distal   segmental/subsegmental branches of the pulmonary arteries are not   evaluated secondary to motion.    In comparison with 5/28/2024, stable left perihilar nodular opacity.   Stable diffuse bilateral ground-glass opacities. New small left pleural   effusion.    < end of copied text >

## 2024-06-13 NOTE — PROGRESS NOTE ADULT - ASSESSMENT
79M with PMH of Lung Ca s/p chemoradiation (last dose of both as per patient in April 2024) and HLD presents with SOB/Cough/Fevers and chills for 2-3 days. onset of cough and MCDONALD 2-3 days prior to admission and since then it has been progressively worsening. Started to have chills 1-2 days prior to admission and these symptoms remains persistent and getting worse. Denies syncope, chest pain, nausea, vomiting, abdominal pain/discomfort. Diarrhea reports (1 episode daily) and non-bloody. Cough productive with yellow and white sputum production. Denies having diagnosis of COPD. Former tobacco use quit many years ago.   5/30/24 Seen and examined at bedside. Seated in chair with no C/O pain or dyspnea at rest. Endorses dyspnea on exertion.  ID [id number].    Assessment and Plan:    1) Lung Ca  -Stage III lung cancer  -S/P neoadjuvant Immune therapy + Chemotherapy with a good response  -S/P Taxol/ Carb + RT  -Now with pneumonia / pneumonitis ? RT related ? prior Immune therapy related  -Anemia   -Onc eval noted  -No evidence of progression of disease    2) Acute Resp failure  -H/O Lung cancer  -S/P treatment  -? pneumonitis  -CXR and CT noted  -antibiotics as per med  -O2 supplementation  -Albuterol/Symbicort  -IV Solumedrol. Down titrate as tolerated    3) Debility  -Weakness  -Dyspnea  -S/P chemo/rad  -Mod protein villa malnutrition  -Nutrition eval  -PT eval    4) comfort measures  -titrate off NRB  --place on NC w/ max of 10L    --treat dyspnea w/ morphine (Roxanol ordered)   --hope is to get home w/ [facility] asap    4) Advanced Directives  -Pt with capacity   -GOC done  - dnr dni comfort only     Time Spent: 75 minutes including the care, coordination and counseling of this patient, 50% of which was spent coordinating and counseling.

## 2024-06-14 NOTE — DISCHARGE NOTE FOR THE EXPIRED PATIENT - HOSPITAL COURSE
79 year-old man with HLD, hx of lung cancer s/p chemoradiation, last dose of both as per patient was in April 2024, p/w dyspnea, cough, fever, chills for 2-3 days, diagnosed with pneumonia had subjective improvement in symptoms intially but  progressively had worsening symptoms. Repeat CT showed worsening respiratory status and overall no significant improvement clinically or despite course of antibiotics and steroids. Patient was subsequently transitioned to comfort measures only. On 06/14/24, atpatient did not respond to verbal or noxious stimuli.  No spontaneous respirations.  Absent heart and breath sounds.  Absent radial and carotid pulses.   Pupils are fixed and dilated, no corneal reflex.  Patient pronounced dead at 15:25.  Family at bedside.

## 2024-06-14 NOTE — GOALS OF CARE CONVERSATION - ADVANCED CARE PLANNING - CONVERSATION DETAILS
Palliative SW & Unit GRETTA Anatoliy met with pt's family to discuss plan of care. Zeyadfuadcortez GRETTA has been in touch with [facility] regarding home hospice referral. They are unable to accept patient for home hospice with current O2 needs BUT are willing to take patient into their inpatient facility. Family explains that the goal was to get her home and if that is not possible, they would want her to remain in the hospital. SWs encouraged family to consider inpatient hospice facility where staff are experts on comfort and symptom management. Questions addressed. Family to discuss. Emotional support provided & our team will continue to follow.

## 2024-06-18 DIAGNOSIS — J70.4 DRUG-INDUCED INTERSTITIAL LUNG DISORDERS, UNSPECIFIED: ICD-10-CM

## 2024-06-18 DIAGNOSIS — J44.0 CHRONIC OBSTRUCTIVE PULMONARY DISEASE WITH (ACUTE) LOWER RESPIRATORY INFECTION: ICD-10-CM
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
## 2024-06-18 DIAGNOSIS — J20.9 ACUTE BRONCHITIS, UNSPECIFIED: ICD-10-CM

## 2024-06-18 DIAGNOSIS — Z79.82 LONG TERM (CURRENT) USE OF ASPIRIN: ICD-10-CM

## 2024-06-18 DIAGNOSIS — J96.01 ACUTE RESPIRATORY FAILURE WITH HYPOXIA: ICD-10-CM

## 2024-06-18 DIAGNOSIS — E43 UNSPECIFIED SEVERE PROTEIN-CALORIE MALNUTRITION: ICD-10-CM

## 2024-06-18 DIAGNOSIS — E22.2 SYNDROME OF INAPPROPRIATE SECRETION OF ANTIDIURETIC HORMONE: ICD-10-CM

## 2024-06-18 DIAGNOSIS — J15.69 PNEUMONIA DUE TO OTHER GRAM-NEGATIVE BACTERIA: ICD-10-CM

## 2024-06-18 DIAGNOSIS — T38.0X5A ADVERSE EFFECT OF GLUCOCORTICOIDS AND SYNTHETIC ANALOGUES, INITIAL ENCOUNTER: ICD-10-CM

## 2024-06-18 DIAGNOSIS — J90 PLEURAL EFFUSION, NOT ELSEWHERE CLASSIFIED: ICD-10-CM

## 2024-06-18 DIAGNOSIS — E78.5 HYPERLIPIDEMIA, UNSPECIFIED: ICD-10-CM

## 2024-06-18 DIAGNOSIS — T45.1X5A ADVERSE EFFECT OF ANTINEOPLASTIC AND IMMUNOSUPPRESSIVE DRUGS, INITIAL ENCOUNTER: ICD-10-CM

## 2024-06-18 DIAGNOSIS — Z66 DO NOT RESUSCITATE: ICD-10-CM

## 2024-06-18 DIAGNOSIS — C34.90 MALIGNANT NEOPLASM OF UNSPECIFIED PART OF UNSPECIFIED BRONCHUS OR LUNG: ICD-10-CM

## 2024-06-18 DIAGNOSIS — R73.03 PREDIABETES: ICD-10-CM

## 2024-06-18 DIAGNOSIS — J70.0 ACUTE PULMONARY MANIFESTATIONS DUE TO RADIATION: ICD-10-CM

## 2024-06-18 DIAGNOSIS — A41.9 SEPSIS, UNSPECIFIED ORGANISM: ICD-10-CM

## 2024-06-18 DIAGNOSIS — I25.10 ATHEROSCLEROTIC HEART DISEASE OF NATIVE CORONARY ARTERY WITHOUT ANGINA PECTORIS: ICD-10-CM

## 2024-06-18 DIAGNOSIS — Z51.5 ENCOUNTER FOR PALLIATIVE CARE: ICD-10-CM

## 2024-06-18 DIAGNOSIS — R65.21 SEVERE SEPSIS WITH SEPTIC SHOCK: ICD-10-CM

## 2024-06-18 DIAGNOSIS — D63.0 ANEMIA IN NEOPLASTIC DISEASE: ICD-10-CM

## 2024-06-18 DIAGNOSIS — Z87.891 PERSONAL HISTORY OF NICOTINE DEPENDENCE: ICD-10-CM

## 2024-06-18 DIAGNOSIS — R73.9 HYPERGLYCEMIA, UNSPECIFIED: ICD-10-CM

## 2024-06-18 DIAGNOSIS — R53.1 WEAKNESS: ICD-10-CM

## 2024-06-18 DIAGNOSIS — D84.89 OTHER IMMUNODEFICIENCIES: ICD-10-CM

## 2024-06-18 DIAGNOSIS — I24.89 OTHER FORMS OF ACUTE ISCHEMIC HEART DISEASE: ICD-10-CM

## 2024-06-18 DIAGNOSIS — R53.81 OTHER MALAISE: ICD-10-CM

## 2024-11-12 NOTE — ED ADULT NURSE NOTE - PRIMARY CARE PROVIDER
81M with a history of coronary artery disease, gastroesophageal reflux, pancreatic mass, and glaucoma who presented to [facility] with hematemesis and melena found to have anemia requiring transfusion of packed red blood cells. CT of the abdomen was notable for an enlarged heterogenous pancreatic head, common bile duct dilation, duodenal thickening, gastric distention, and liver hypodensities. The patient was transfered to [facility] for evaluation and underwent endoscopy with findings of a duodenal mass. Packed red blood cells were transfused for recurrent anemia and the patient underwent radiation therapy for tumor bleeding.    Acute blood loss anemia  - Endoscopy (11/1) noted old blood, ulcerated mucosa in the duodenal bulb, and duodenal mass  - s/p radiation on 11/7  - hgb today 9.2 (s/p 1U prbc yesterday)  - s/p retacrit 40K x1 11/11  - c/w PPI BID  - [place] [facility] reached out to for follow up who discussed with rad onc, may take 5-7 days to see improvement in bleeding following radiation  - trend CBC     Inability to tolerate PO intake  - 2/2 gastric outlet obstruction from mass  - s/p stent with GI 11/11  - surgery following, if no improvement after stent placement may be candidate for palliative surgery    Thrombocytopenia  - stable  - heme/onc following, f/u lab results  - Monitoring platelet counts    Hypoxia 2/2 PNA  - Improved, currently on RA  - Afebrile, WBC wnl  - CXR without congestion but RLL infiltrate  - procal elevated, c/w abx for total 5 days    Acute kidney injury - resolved   - possibly 2/2 IV lasix  - s/p intravenous fluids  - Continue to monitor renal functions    Pancreatic / Duodenal mass  - Prior MRI(10/29) noted few small poorly delineated hepatic lesions, likely metastases, 4.5 x 3.5 cm infiltrative pancreatic head mass invading the proximal duodenum, interruption of the intrapancreatic portion of the common bile and main pancreatic ducts at the level of the mass with only minimal upstream ductal dilatation  - CA 19-9 level elevated  - s/p radiation 11/7  - Biopsy results noted adenocarcinoma, pt aware  - Oncology following. planning for outpatient treatment once stable for d/c    Coronary artery disease / Hypertension  - SBP has been borderline 100s  - d/c amlodipine  - c/w low dose coreg  - Aspirin on hold due to bleeding and anemia  - resume amlodipine  - inc coreg back to 6.25mg BID  - Lisinopril and spironolactone were on hold for CJ    Glaucoma  - On timolol eye drops      Dispo: currently active pending stabilization of hgb, improvement in PO status       81M with a history of coronary artery disease, gastroesophageal reflux, pancreatic mass, and glaucoma who presented to [facility] with hematemesis and melena found to have anemia requiring transfusion of packed red blood cells. CT of the abdomen was notable for an enlarged heterogenous pancreatic head, common bile duct dilation, duodenal thickening, gastric distention, and liver hypodensities. The patient was transfered to [facility] for evaluation and underwent endoscopy with findings of a duodenal mass. Packed red blood cells were transfused for recurrent anemia and the patient underwent radiation therapy for tumor bleeding.    Acute blood loss anemia  - Endoscopy (11/1) noted old blood, ulcerated mucosa in the duodenal bulb, and duodenal mass  - s/p radiation on 11/7  - hgb today 9.2 (s/p 1U prbc yesterday)  - s/p retacrit 40K x1 11/11  - c/w PPI BID  - [place] [facility] reached out to for follow up who discussed with rad onc, may take 5-7 days to see improvement in bleeding following radiation  - trend CBC     Inability to tolerate PO intake  - 2/2 gastric outlet obstruction from mass  - s/p stent with GI 11/11  - surgery following, if no improvement after stent placement may be candidate for palliative surgery    Thrombocytopenia  - stable  - heme/onc following, f/u lab results  - Monitoring platelet counts    Hypoxia 2/2 PNA  - Improved, currently on RA  - Afebrile, WBC wnl  - CXR without congestion but RLL infiltrate  - procal elevated, c/w abx for total 5 days    Acute kidney injury - resolved   - possibly 2/2 IV lasix  - s/p intravenous fluids  - Continue to monitor renal functions    Pancreatic / Duodenal mass  - Prior MRI(10/29) noted few small poorly delineated hepatic lesions, likely metastases, 4.5 x 3.5 cm infiltrative pancreatic head mass invading the proximal duodenum, interruption of the intrapancreatic portion of the common bile and main pancreatic ducts at the level of the mass with only minimal upstream ductal dilatation  - CA 19-9 level elevated  - s/p radiation 11/7  - Biopsy results noted adenocarcinoma, pt aware  - Oncology following. planning for outpatient treatment once stable for d/c    Coronary artery disease / Hypertension  - SBP has been borderline 100s  - d/c amlodipine  - c/w low dose coreg  - Aspirin on hold due to bleeding and anemia  - resume amlodipine  - inc coreg back to 6.25mg BID  - Lisinopril and spironolactone were on hold for CJ  - will resume spironolactone given LE edema from fluids    Glaucoma  - On timolol eye drops      Dispo: currently active pending stabilization of hgb, improvement in PO status       Unk
